# Patient Record
Sex: FEMALE | Race: BLACK OR AFRICAN AMERICAN | NOT HISPANIC OR LATINO | URBAN - METROPOLITAN AREA
[De-identification: names, ages, dates, MRNs, and addresses within clinical notes are randomized per-mention and may not be internally consistent; named-entity substitution may affect disease eponyms.]

---

## 2018-03-16 ENCOUNTER — EMERGENCY (EMERGENCY)
Facility: HOSPITAL | Age: 68
LOS: 1 days | Discharge: HOME | End: 2018-03-16
Attending: EMERGENCY MEDICINE | Admitting: EMERGENCY MEDICINE
Payer: COMMERCIAL

## 2018-03-16 ENCOUNTER — APPOINTMENT (OUTPATIENT)
Dept: VASCULAR SURGERY | Facility: CLINIC | Age: 68
End: 2018-03-16
Payer: COMMERCIAL

## 2018-03-16 VITALS
OXYGEN SATURATION: 100 % | DIASTOLIC BLOOD PRESSURE: 54 MMHG | HEART RATE: 55 BPM | WEIGHT: 169.98 LBS | RESPIRATION RATE: 16 BRPM | SYSTOLIC BLOOD PRESSURE: 120 MMHG | TEMPERATURE: 99 F

## 2018-03-16 VITALS
HEIGHT: 67 IN | OXYGEN SATURATION: 99 % | BODY MASS INDEX: 26.68 KG/M2 | DIASTOLIC BLOOD PRESSURE: 78 MMHG | HEART RATE: 60 BPM | WEIGHT: 170 LBS | SYSTOLIC BLOOD PRESSURE: 125 MMHG

## 2018-03-16 VITALS
SYSTOLIC BLOOD PRESSURE: 116 MMHG | OXYGEN SATURATION: 96 % | DIASTOLIC BLOOD PRESSURE: 75 MMHG | TEMPERATURE: 98 F | HEART RATE: 59 BPM | RESPIRATION RATE: 16 BRPM

## 2018-03-16 PROBLEM — Z00.00 ENCOUNTER FOR PREVENTIVE HEALTH EXAMINATION: Status: ACTIVE | Noted: 2018-03-16

## 2018-03-16 PROCEDURE — 82962 GLUCOSE BLOOD TEST: CPT

## 2018-03-16 PROCEDURE — 73600 X-RAY EXAM OF ANKLE: CPT

## 2018-03-16 PROCEDURE — 99283 EMERGENCY DEPT VISIT LOW MDM: CPT | Mod: 25

## 2018-03-16 PROCEDURE — 73600 X-RAY EXAM OF ANKLE: CPT | Mod: 26,LT

## 2018-03-16 PROCEDURE — 99283 EMERGENCY DEPT VISIT LOW MDM: CPT

## 2018-03-16 PROCEDURE — 99244 OFF/OP CNSLTJ NEW/EST MOD 40: CPT

## 2018-03-16 PROCEDURE — 93971 EXTREMITY STUDY: CPT

## 2018-03-16 RX ORDER — OXYCODONE AND ACETAMINOPHEN 5; 325 MG/1; MG/1
1 TABLET ORAL ONCE
Qty: 0 | Refills: 0 | Status: DISCONTINUED | OUTPATIENT
Start: 2018-03-16 | End: 2018-03-16

## 2018-03-16 RX ORDER — AZTREONAM 2 G
2 VIAL (EA) INJECTION
Qty: 38 | Refills: 0 | OUTPATIENT
Start: 2018-03-16 | End: 2018-03-25

## 2018-03-16 RX ORDER — AMOXICILLIN AND CLAVULANATE POTASSIUM 875; 125 MG/1; MG/1
875-125 TABLET, COATED ORAL
Qty: 20 | Refills: 0 | Status: ACTIVE | COMMUNITY
Start: 2018-03-16 | End: 1900-01-01

## 2018-03-16 RX ADMIN — OXYCODONE AND ACETAMINOPHEN 1 TABLET(S): 5; 325 TABLET ORAL at 16:06

## 2018-03-16 RX ADMIN — Medication 2 TABLET(S): at 17:42

## 2018-03-16 RX ADMIN — OXYCODONE AND ACETAMINOPHEN 1 TABLET(S): 5; 325 TABLET ORAL at 17:30

## 2018-03-16 NOTE — ED PROVIDER NOTE - MEDICAL DECISION MAKING DETAILS
Patient is a 69yo F w/ PMH HLD, HTN, DM, varicose veins presenting w/ LLE pain. Patient is a 67yo F w/ PMH HLD, HTN, DM, varicose veins presenting w/ LLE pain. Patient in apparent severe pain; received additional percocet (had already taken one prior to arriving) and started on DS bactrim. XR L ankle w/o e/o OM. Patient advised to take augmentin and bactrim x10 days, to take pain meds as prescribed, and to continue to follow w/ Dr. Bonilla for continued care. Patient is a 69yo F w/ PMH HLD, HTN, DM, varicose veins presenting w/ LLE pain. Patient in apparent severe pain; received additional percocet (had already taken one prior to arriving). Patient s/p 5days keflex with improved swelling, however still w/ significant pain and purulent drainage. Started on DS bactrim for MRSA coverage. XR L ankle w/o e/o OM. Patient advised to take augmentin and bactrim x10 days, to take pain meds as prescribed, and to continue to follow w/ Dr. Bonilla for continued care.

## 2018-03-16 NOTE — ED PROVIDER NOTE - CONDUCTED A DETAILED DISCUSSION WITH PATIENT AND/OR GUARDIAN REGARDING, MDM
need for outpatient follow-up/radiology results return to ED if symptoms worsen, persist or questions arise/radiology results/need for outpatient follow-up

## 2018-03-16 NOTE — ED PROVIDER NOTE - PMH
Essential hypertension    Hyperlipidemia, unspecified hyperlipidemia type    Type 2 diabetes mellitus without complication, without long-term current use of insulin    Varicose vein of leg

## 2018-03-16 NOTE — ED PROVIDER NOTE - ATTENDING CONTRIBUTION TO CARE
pt seen and examined by me, agree with above.  67 yo female with left leg ulcer, being treated by pmd and saw vascular Dr Bonilla today.  given rx for abx and pain medication but while waiting in pharmacy the pain got too much.  on exam, left distal lat leg 2 cm shallow ulcer.  left foot nv intact.  xray no osteo.  will add bactrim to abx and fu pmd

## 2018-03-16 NOTE — ED PROVIDER NOTE - OBJECTIVE STATEMENT
Patient is a 67yo F w/ PMH HLD, HTN, DM, varicose veins presenting w/ LLE pain. Patient is a 69yo F w/ PMH HLD, HTN, DM, varicose veins presenting w/ LLE pain. Patient reports that pain initially developed last Saturday in the setting of a LLL swelling, pain, and an ulcer of the L lateral ankle. Patient went to [facility] for care where she received a 5d course of Keflex for presumed cellulitis. Pt would take ibuprofen for pain. Pt reports swelling improved but pain persisted. Patient completed her abx course yest and f/u w/ Dr. Bonilla today. LE doppler was performed today which was negative, however pt continued to endorse severe pain. Pt's wound was wrapped w/ ACE and she was prescribed additional course of augmentin and percocet. While waiting to fill her prescriptions at the pharmacy, pt reports LLE pain became excruciating nd EMS was called. She took one percocet pill prior to presenting to the ED. Pt reports 10/10 pain, sharp, constant, localized to L ankle w/ radiation to the toes and mid-shin. Pt also reports purulent malodorous drainage; denies f/c/d/c/n/v, CP, dyspnea, trauma, insect/animal bites, cold extremities, immobility, recent travel.

## 2018-03-16 NOTE — ED ADULT NURSE NOTE - OBJECTIVE STATEMENT
Pt presents to ED with c/o L foot pain worsening after visiting doctor today for wound care, pt reports dx at [facility] ED with cellulitis last Saturday. Last took 1 percocet 45 min ago.

## 2018-03-16 NOTE — ED PROVIDER NOTE - CARE PLAN
Principal Discharge DX:	Cellulitis of left lower extremity  Goal:	resolution Principal Discharge DX:	Cellulitis of left lower extremity  Goal:	resolution  Assessment and plan of treatment:	XR of L ankle w/o e/o OM. Patient given additional percocet and started on DS bactrim.

## 2018-03-20 DIAGNOSIS — Z79.4 LONG TERM (CURRENT) USE OF INSULIN: ICD-10-CM

## 2018-03-20 DIAGNOSIS — I10 ESSENTIAL (PRIMARY) HYPERTENSION: ICD-10-CM

## 2018-03-20 DIAGNOSIS — E78.5 HYPERLIPIDEMIA, UNSPECIFIED: ICD-10-CM

## 2018-03-20 DIAGNOSIS — M79.672 PAIN IN LEFT FOOT: ICD-10-CM

## 2018-03-20 DIAGNOSIS — L03.116 CELLULITIS OF LEFT LOWER LIMB: ICD-10-CM

## 2018-03-20 DIAGNOSIS — E11.9 TYPE 2 DIABETES MELLITUS WITHOUT COMPLICATIONS: ICD-10-CM

## 2018-03-23 ENCOUNTER — INPATIENT (INPATIENT)
Facility: HOSPITAL | Age: 68
LOS: 12 days | Discharge: HOME HEALTH | DRG: 300 | End: 2018-04-05
Attending: SURGERY | Admitting: SURGERY
Payer: COMMERCIAL

## 2018-03-23 ENCOUNTER — APPOINTMENT (OUTPATIENT)
Dept: VASCULAR SURGERY | Facility: CLINIC | Age: 68
End: 2018-03-23
Payer: COMMERCIAL

## 2018-03-23 VITALS
DIASTOLIC BLOOD PRESSURE: 61 MMHG | TEMPERATURE: 98 F | SYSTOLIC BLOOD PRESSURE: 103 MMHG | HEART RATE: 61 BPM | OXYGEN SATURATION: 96 % | WEIGHT: 164.91 LBS | RESPIRATION RATE: 18 BRPM | HEIGHT: 67 IN

## 2018-03-23 VITALS
HEART RATE: 66 BPM | TEMPERATURE: 98.1 F | OXYGEN SATURATION: 99 % | SYSTOLIC BLOOD PRESSURE: 118 MMHG | DIASTOLIC BLOOD PRESSURE: 72 MMHG

## 2018-03-23 DIAGNOSIS — L98.491 NON-PRESSURE CHRONIC ULCER OF SKIN OF OTHER SITES LIMITED TO BREAKDOWN OF SKIN: ICD-10-CM

## 2018-03-23 DIAGNOSIS — E78.5 HYPERLIPIDEMIA, UNSPECIFIED: ICD-10-CM

## 2018-03-23 DIAGNOSIS — I10 ESSENTIAL (PRIMARY) HYPERTENSION: ICD-10-CM

## 2018-03-23 DIAGNOSIS — L98.499 NON-PRESSURE CHRONIC ULCER OF SKIN OF OTHER SITES WITH UNSPECIFIED SEVERITY: ICD-10-CM

## 2018-03-23 DIAGNOSIS — E11.9 TYPE 2 DIABETES MELLITUS WITHOUT COMPLICATIONS: ICD-10-CM

## 2018-03-23 DIAGNOSIS — E87.5 HYPERKALEMIA: ICD-10-CM

## 2018-03-23 DIAGNOSIS — L03.116 CELLULITIS OF LEFT LOWER LIMB: ICD-10-CM

## 2018-03-23 DIAGNOSIS — L08.9 NON-PRESSURE CHRONIC ULCER OF SKIN OF OTHER SITES LIMITED TO BREAKDOWN OF SKIN: ICD-10-CM

## 2018-03-23 LAB
ALBUMIN SERPL ELPH-MCNC: 4.4 G/DL — SIGNIFICANT CHANGE UP (ref 3.3–5)
ALP SERPL-CCNC: 89 U/L — SIGNIFICANT CHANGE UP (ref 40–120)
ALT FLD-CCNC: 17 U/L — SIGNIFICANT CHANGE UP (ref 10–45)
ANION GAP SERPL CALC-SCNC: 17 MMOL/L — SIGNIFICANT CHANGE UP (ref 5–17)
ANION GAP SERPL CALC-SCNC: 19 MMOL/L — HIGH (ref 5–17)
AST SERPL-CCNC: 23 U/L — SIGNIFICANT CHANGE UP (ref 10–40)
BASOPHILS NFR BLD AUTO: 0.3 % — SIGNIFICANT CHANGE UP (ref 0–2)
BILIRUB SERPL-MCNC: 0.4 MG/DL — SIGNIFICANT CHANGE UP (ref 0.2–1.2)
BUN SERPL-MCNC: 28 MG/DL — HIGH (ref 7–23)
BUN SERPL-MCNC: 29 MG/DL — HIGH (ref 7–23)
CALCIUM SERPL-MCNC: 10.1 MG/DL — SIGNIFICANT CHANGE UP (ref 8.4–10.5)
CALCIUM SERPL-MCNC: 10.4 MG/DL — SIGNIFICANT CHANGE UP (ref 8.4–10.5)
CHLORIDE SERPL-SCNC: 92 MMOL/L — LOW (ref 96–108)
CHLORIDE SERPL-SCNC: 94 MMOL/L — LOW (ref 96–108)
CO2 SERPL-SCNC: 22 MMOL/L — SIGNIFICANT CHANGE UP (ref 22–31)
CO2 SERPL-SCNC: 23 MMOL/L — SIGNIFICANT CHANGE UP (ref 22–31)
CREAT SERPL-MCNC: 1.85 MG/DL — HIGH (ref 0.5–1.3)
CREAT SERPL-MCNC: 1.93 MG/DL — HIGH (ref 0.5–1.3)
EOSINOPHIL NFR BLD AUTO: 5 % — SIGNIFICANT CHANGE UP (ref 0–6)
EXTRA BLUE TOP TUBE: SIGNIFICANT CHANGE UP
EXTRA SST TUBE: SIGNIFICANT CHANGE UP
GLUCOSE BLDC GLUCOMTR-MCNC: 158 MG/DL — HIGH (ref 70–99)
GLUCOSE BLDC GLUCOMTR-MCNC: 197 MG/DL — HIGH (ref 70–99)
GLUCOSE SERPL-MCNC: 178 MG/DL — HIGH (ref 70–99)
GLUCOSE SERPL-MCNC: 218 MG/DL — HIGH (ref 70–99)
GRAM STN FLD: SIGNIFICANT CHANGE UP
HCT VFR BLD CALC: 36.5 % — SIGNIFICANT CHANGE UP (ref 34.5–45)
HGB BLD-MCNC: 11.4 G/DL — LOW (ref 11.5–15.5)
LYMPHOCYTES # BLD AUTO: 18.4 % — SIGNIFICANT CHANGE UP (ref 13–44)
MCHC RBC-ENTMCNC: 28.6 PG — SIGNIFICANT CHANGE UP (ref 27–34)
MCHC RBC-ENTMCNC: 31.2 G/DL — LOW (ref 32–36)
MCV RBC AUTO: 91.5 FL — SIGNIFICANT CHANGE UP (ref 80–100)
MONOCYTES NFR BLD AUTO: 11.3 % — SIGNIFICANT CHANGE UP (ref 2–14)
NEUTROPHILS NFR BLD AUTO: 65 % — SIGNIFICANT CHANGE UP (ref 43–77)
PLATELET # BLD AUTO: 202 K/UL — SIGNIFICANT CHANGE UP (ref 150–400)
POTASSIUM SERPL-MCNC: 4.3 MMOL/L — SIGNIFICANT CHANGE UP (ref 3.5–5.3)
POTASSIUM SERPL-MCNC: 5.5 MMOL/L — HIGH (ref 3.5–5.3)
POTASSIUM SERPL-SCNC: 4.3 MMOL/L — SIGNIFICANT CHANGE UP (ref 3.5–5.3)
POTASSIUM SERPL-SCNC: 5.5 MMOL/L — HIGH (ref 3.5–5.3)
PROT SERPL-MCNC: 8.1 G/DL — SIGNIFICANT CHANGE UP (ref 6–8.3)
RBC # BLD: 3.99 M/UL — SIGNIFICANT CHANGE UP (ref 3.8–5.2)
RBC # FLD: 16 % — SIGNIFICANT CHANGE UP (ref 10.3–16.9)
SODIUM SERPL-SCNC: 133 MMOL/L — LOW (ref 135–145)
SODIUM SERPL-SCNC: 134 MMOL/L — LOW (ref 135–145)
SPECIMEN SOURCE: SIGNIFICANT CHANGE UP
WBC # BLD: 3.8 K/UL — SIGNIFICANT CHANGE UP (ref 3.8–10.5)
WBC # FLD AUTO: 3.8 K/UL — SIGNIFICANT CHANGE UP (ref 3.8–10.5)

## 2018-03-23 PROCEDURE — 99214 OFFICE O/P EST MOD 30 MIN: CPT

## 2018-03-23 PROCEDURE — 93010 ELECTROCARDIOGRAM REPORT: CPT | Mod: NC

## 2018-03-23 PROCEDURE — 99285 EMERGENCY DEPT VISIT HI MDM: CPT | Mod: 25

## 2018-03-23 PROCEDURE — 73610 X-RAY EXAM OF ANKLE: CPT | Mod: 26,LT

## 2018-03-23 RX ORDER — VANCOMYCIN HCL 1 G
1000 VIAL (EA) INTRAVENOUS ONCE
Qty: 0 | Refills: 0 | Status: COMPLETED | OUTPATIENT
Start: 2018-03-23 | End: 2018-03-23

## 2018-03-23 RX ORDER — SODIUM CHLORIDE 9 MG/ML
1000 INJECTION, SOLUTION INTRAVENOUS
Qty: 0 | Refills: 0 | Status: DISCONTINUED | OUTPATIENT
Start: 2018-03-23 | End: 2018-04-05

## 2018-03-23 RX ORDER — ACETAMINOPHEN 500 MG
1000 TABLET ORAL ONCE
Qty: 0 | Refills: 0 | Status: COMPLETED | OUTPATIENT
Start: 2018-03-23 | End: 2018-03-23

## 2018-03-23 RX ORDER — DEXTROSE 50 % IN WATER 50 %
25 SYRINGE (ML) INTRAVENOUS ONCE
Qty: 0 | Refills: 0 | Status: DISCONTINUED | OUTPATIENT
Start: 2018-03-23 | End: 2018-04-05

## 2018-03-23 RX ORDER — DOCUSATE SODIUM 100 MG
100 CAPSULE ORAL THREE TIMES A DAY
Qty: 0 | Refills: 0 | Status: DISCONTINUED | OUTPATIENT
Start: 2018-03-23 | End: 2018-04-05

## 2018-03-23 RX ORDER — GLUCAGON INJECTION, SOLUTION 0.5 MG/.1ML
1 INJECTION, SOLUTION SUBCUTANEOUS ONCE
Qty: 0 | Refills: 0 | Status: DISCONTINUED | OUTPATIENT
Start: 2018-03-23 | End: 2018-04-05

## 2018-03-23 RX ORDER — INSULIN LISPRO 100/ML
VIAL (ML) SUBCUTANEOUS
Qty: 0 | Refills: 0 | Status: DISCONTINUED | OUTPATIENT
Start: 2018-03-23 | End: 2018-04-05

## 2018-03-23 RX ORDER — ONDANSETRON 8 MG/1
4 TABLET, FILM COATED ORAL ONCE
Qty: 0 | Refills: 0 | Status: COMPLETED | OUTPATIENT
Start: 2018-03-23 | End: 2018-03-30

## 2018-03-23 RX ORDER — LOSARTAN POTASSIUM 100 MG/1
100 TABLET, FILM COATED ORAL DAILY
Qty: 0 | Refills: 0 | Status: DISCONTINUED | OUTPATIENT
Start: 2018-03-23 | End: 2018-04-05

## 2018-03-23 RX ORDER — SODIUM CHLORIDE 9 MG/ML
1000 INJECTION INTRAMUSCULAR; INTRAVENOUS; SUBCUTANEOUS
Qty: 0 | Refills: 0 | Status: DISCONTINUED | OUTPATIENT
Start: 2018-03-23 | End: 2018-03-25

## 2018-03-23 RX ORDER — COLLAGENASE CLOSTRIDIUM HIST. 250 UNIT/G
1 OINTMENT (GRAM) TOPICAL DAILY
Qty: 0 | Refills: 0 | Status: DISCONTINUED | OUTPATIENT
Start: 2018-03-23 | End: 2018-04-05

## 2018-03-23 RX ORDER — PIPERACILLIN AND TAZOBACTAM 4; .5 G/20ML; G/20ML
3.38 INJECTION, POWDER, LYOPHILIZED, FOR SOLUTION INTRAVENOUS EVERY 6 HOURS
Qty: 0 | Refills: 0 | Status: DISCONTINUED | OUTPATIENT
Start: 2018-03-23 | End: 2018-03-23

## 2018-03-23 RX ORDER — PIPERACILLIN AND TAZOBACTAM 4; .5 G/20ML; G/20ML
2.25 INJECTION, POWDER, LYOPHILIZED, FOR SOLUTION INTRAVENOUS EVERY 6 HOURS
Qty: 0 | Refills: 0 | Status: DISCONTINUED | OUTPATIENT
Start: 2018-03-23 | End: 2018-03-27

## 2018-03-23 RX ORDER — ACETAMINOPHEN WITH CODEINE 300MG-30MG
1 TABLET ORAL ONCE
Qty: 0 | Refills: 0 | Status: DISCONTINUED | OUTPATIENT
Start: 2018-03-23 | End: 2018-03-23

## 2018-03-23 RX ORDER — DEXTROSE 50 % IN WATER 50 %
12.5 SYRINGE (ML) INTRAVENOUS ONCE
Qty: 0 | Refills: 0 | Status: DISCONTINUED | OUTPATIENT
Start: 2018-03-23 | End: 2018-04-05

## 2018-03-23 RX ORDER — DEXTROSE 50 % IN WATER 50 %
1 SYRINGE (ML) INTRAVENOUS ONCE
Qty: 0 | Refills: 0 | Status: DISCONTINUED | OUTPATIENT
Start: 2018-03-23 | End: 2018-04-05

## 2018-03-23 RX ORDER — PIPERACILLIN AND TAZOBACTAM 4; .5 G/20ML; G/20ML
3.38 INJECTION, POWDER, LYOPHILIZED, FOR SOLUTION INTRAVENOUS ONCE
Qty: 0 | Refills: 0 | Status: COMPLETED | OUTPATIENT
Start: 2018-03-23 | End: 2018-03-23

## 2018-03-23 RX ORDER — SIMVASTATIN 20 MG/1
40 TABLET, FILM COATED ORAL AT BEDTIME
Qty: 0 | Refills: 0 | Status: DISCONTINUED | OUTPATIENT
Start: 2018-03-23 | End: 2018-04-05

## 2018-03-23 RX ORDER — SODIUM POLYSTYRENE SULFONATE 4.1 MEQ/G
30 POWDER, FOR SUSPENSION ORAL ONCE
Qty: 0 | Refills: 0 | Status: COMPLETED | OUTPATIENT
Start: 2018-03-23 | End: 2018-03-23

## 2018-03-23 RX ORDER — MORPHINE SULFATE 50 MG/1
2 CAPSULE, EXTENDED RELEASE ORAL ONCE
Qty: 0 | Refills: 0 | Status: DISCONTINUED | OUTPATIENT
Start: 2018-03-23 | End: 2018-03-24

## 2018-03-23 RX ORDER — VANCOMYCIN HCL 1 G
1000 VIAL (EA) INTRAVENOUS EVERY 12 HOURS
Qty: 0 | Refills: 0 | Status: DISCONTINUED | OUTPATIENT
Start: 2018-03-23 | End: 2018-03-23

## 2018-03-23 RX ORDER — VANCOMYCIN HCL 1 G
1000 VIAL (EA) INTRAVENOUS EVERY 24 HOURS
Qty: 0 | Refills: 0 | Status: DISCONTINUED | OUTPATIENT
Start: 2018-03-24 | End: 2018-03-25

## 2018-03-23 RX ADMIN — Medication 1 TABLET(S): at 14:44

## 2018-03-23 RX ADMIN — PIPERACILLIN AND TAZOBACTAM 200 GRAM(S): 4; .5 INJECTION, POWDER, LYOPHILIZED, FOR SOLUTION INTRAVENOUS at 13:47

## 2018-03-23 RX ADMIN — PIPERACILLIN AND TAZOBACTAM 200 GRAM(S): 4; .5 INJECTION, POWDER, LYOPHILIZED, FOR SOLUTION INTRAVENOUS at 19:14

## 2018-03-23 RX ADMIN — SIMVASTATIN 40 MILLIGRAM(S): 20 TABLET, FILM COATED ORAL at 22:14

## 2018-03-23 RX ADMIN — Medication 400 MILLIGRAM(S): at 22:14

## 2018-03-23 RX ADMIN — SODIUM POLYSTYRENE SULFONATE 30 GRAM(S): 4.1 POWDER, FOR SUSPENSION ORAL at 14:44

## 2018-03-23 RX ADMIN — Medication 2: at 22:30

## 2018-03-23 RX ADMIN — Medication 1 APPLICATION(S): at 22:14

## 2018-03-23 RX ADMIN — Medication 250 MILLIGRAM(S): at 14:38

## 2018-03-23 RX ADMIN — Medication 1 TABLET(S): at 15:15

## 2018-03-23 RX ADMIN — Medication 1000 MILLIGRAM(S): at 22:29

## 2018-03-23 RX ADMIN — SODIUM CHLORIDE 125 MILLILITER(S): 9 INJECTION INTRAMUSCULAR; INTRAVENOUS; SUBCUTANEOUS at 13:47

## 2018-03-23 NOTE — ED PROVIDER NOTE - DIAGNOSTIC INTERPRETATION
ER Physician:   INTERPRETATION: left ankle : no fracture, + ulceration on soft tissue, no periosteal indication of osteomyelitis

## 2018-03-23 NOTE — ED PROVIDER NOTE - NEURO NEGATIVE STATEMENT, MLM
no sensory deficits, and no weakness. DM (diabetes mellitus)    High cholesterol    HTN (hypertension)    Prostate CA    Pulmonary hypertension    Renal insufficiency    Sleep apnea

## 2018-03-23 NOTE — ED PROVIDER NOTE - ATTENDING CONTRIBUTION TO CARE
67 y/o f with DM ulcer to left lateral malleolus sent by vascular surgery for evaluation.  Has been on bactrim daily - continues to complain of pain over area.  Will call for vascular for plan.

## 2018-03-23 NOTE — ED ADULT NURSE NOTE - OBJECTIVE STATEMENT
c/o left ankle ulcer with foul odor and purulent drain x 1 month. Pt states "I had a varicose vein that burst open and never healed." Pt also reports having subjective fever and chills last night. Pt is currently on Amoxicillin and Bactrim that was started last week, however wound is not getting better. Pt was referred for  direct admit, however was unable to tolerate foot pain and came to the ED.

## 2018-03-23 NOTE — H&P ADULT - HISTORY OF PRESENT ILLNESS
Attending: Irene    HPI:    Patient is a 68y old  Female with PMHx of HTN, HLD, DM who presents c/o of worsening left lateral malleolar ulcer and pain x 1 month. Per patient one month ago she had varicosity that "burst" in the same location and subsequently developed progressive blistering and ulceration of the skin. Pt failed outpatient tx of Keflex, bactrim and amoxicillin. Ulcer has worsened since Tuesday per patient, with associated subjective fever/chills, purulent drainage from the wound, N/V, multiple episodes of diarrhea, and severe sharp pain in the LLE not controlled with Percocet. Patient is admitted to vascular surgery for IV antibiotics and wound management.    Patient denies chest pain, SOB, LE edema, hx of DVT or PE, hx of PAD.        PAST MEDICAL & SURGICAL HISTORY:  Varicose vein of leg  Type 2 diabetes mellitus without complication, without long-term current use of insulin  Essential hypertension  Hyperlipidemia, unspecified hyperlipidemia type  No significant past surgical history      Home Medications:          Vitals (Last 12 Hours)  T(F): 98.2 (03-23-18 @ 13:50), Max: 98.2 (03-23-18 @ 13:50)  HR:  (54 - 61)  BP:  (103/58 - 103/61)  RR:  (18 - 18)  SpO2:  (96% - 96%)        PHYSICAL EXAM:  General: NAD, wdwn, AxOx3  Pulmonary: Nonlabored breathing, no respiratory distress, saturating 96 on RA  Cardiovascular:  Abdominal:   Extremities: warm, hyperpigmentation of the bilateral lower extremities, 3x4 cm macerated, erythematous ulcer on the lateral malleolus with mucopurulent drainage and blistering, palpable pulses b/l LE     Pulse Exam:  - Right:  ----- DP: 2+ monophasic   ----- PT: 2+ biphasic   ----- POP:   ----- FEM:  Left:   ----- DP: 2+ monophasic  ----- PT: 2+ biphasic   ----- POP:   ----- FEM:         LABS:                          11.4   3.8   )-----------( 202      ( 23 Mar 2018 12:13 )             36.5     03-23    133<L>  |  94<L>  |  28<H>  ----------------------------<  218<H>  5.5<H>   |  22  |  1.93<H>    Ca    10.4      23 Mar 2018 12:12    TPro  8.1  /  Alb  4.4  /  TBili  0.4  /  DBili  x   /  AST  23  /  ALT  17  /  AlkPhos  89  03-23            CAPILLARY BLOOD GLUCOSE        CAPILLARY BLOOD GLUCOSE            Microbiology:        RADIOLOGY & ADDITIONAL STUDIES:

## 2018-03-23 NOTE — ED ADULT TRIAGE NOTE - CHIEF COMPLAINT QUOTE
pt c/o worsening left foot pain due to ulcer over the past month. pt also reports abd pain and n/v since Tuesday. hx of diabetes.

## 2018-03-23 NOTE — ED PROVIDER NOTE - MEDICAL DECISION MAKING DETAILS
Patient with left lower leg infected ulceration. Afebrile, IV abx therapy started and vascular was consulted. Patient was admitted for IV abx therapy.

## 2018-03-23 NOTE — H&P ADULT - PROBLEM SELECTOR PLAN 1
- Pt failed outpt treatment with Keflex, Bactrim and Amox  - blood culture x2 and wound culture obtained in ED, f/u results  - XRay ankle ordered, f/u results   - Vanc started in ED on 3/23, f/u troph

## 2018-03-23 NOTE — H&P ADULT - ASSESSMENT
Pt is a 69 yo F with PMHx of DM, HTN, HLD who presents c/o worsening pain and ulceration of the left lateral malleolus after failed outpatient therapy. Pt is admitted to vascular surgery for IV abx treatment and wound management.

## 2018-03-24 LAB
ANION GAP SERPL CALC-SCNC: 11 MMOL/L — SIGNIFICANT CHANGE UP (ref 5–17)
APPEARANCE UR: CLEAR — SIGNIFICANT CHANGE UP
BILIRUB UR-MCNC: NEGATIVE — SIGNIFICANT CHANGE UP
BUN SERPL-MCNC: 21 MG/DL — SIGNIFICANT CHANGE UP (ref 7–23)
CALCIUM SERPL-MCNC: 8.8 MG/DL — SIGNIFICANT CHANGE UP (ref 8.4–10.5)
CHLORIDE SERPL-SCNC: 100 MMOL/L — SIGNIFICANT CHANGE UP (ref 96–108)
CO2 SERPL-SCNC: 23 MMOL/L — SIGNIFICANT CHANGE UP (ref 22–31)
COLOR SPEC: YELLOW — SIGNIFICANT CHANGE UP
CREAT SERPL-MCNC: 1.42 MG/DL — HIGH (ref 0.5–1.3)
DIFF PNL FLD: NEGATIVE — SIGNIFICANT CHANGE UP
GLUCOSE BLDC GLUCOMTR-MCNC: 113 MG/DL — HIGH (ref 70–99)
GLUCOSE BLDC GLUCOMTR-MCNC: 190 MG/DL — HIGH (ref 70–99)
GLUCOSE BLDC GLUCOMTR-MCNC: 214 MG/DL — HIGH (ref 70–99)
GLUCOSE BLDC GLUCOMTR-MCNC: 248 MG/DL — HIGH (ref 70–99)
GLUCOSE SERPL-MCNC: 130 MG/DL — HIGH (ref 70–99)
GLUCOSE UR QL: >=1000
HBA1C BLD-MCNC: 7.3 % — HIGH (ref 4–5.6)
HCT VFR BLD CALC: 32.5 % — LOW (ref 34.5–45)
HGB BLD-MCNC: 9.9 G/DL — LOW (ref 11.5–15.5)
KETONES UR-MCNC: NEGATIVE — SIGNIFICANT CHANGE UP
LEUKOCYTE ESTERASE UR-ACNC: NEGATIVE — SIGNIFICANT CHANGE UP
MAGNESIUM SERPL-MCNC: 2.3 MG/DL — SIGNIFICANT CHANGE UP (ref 1.6–2.6)
MCHC RBC-ENTMCNC: 27.7 PG — SIGNIFICANT CHANGE UP (ref 27–34)
MCHC RBC-ENTMCNC: 30.5 G/DL — LOW (ref 32–36)
MCV RBC AUTO: 90.8 FL — SIGNIFICANT CHANGE UP (ref 80–100)
NITRITE UR-MCNC: NEGATIVE — SIGNIFICANT CHANGE UP
PH UR: 7.5 — SIGNIFICANT CHANGE UP (ref 5–8)
PHOSPHATE SERPL-MCNC: 3.7 MG/DL — SIGNIFICANT CHANGE UP (ref 2.5–4.5)
PLATELET # BLD AUTO: 189 K/UL — SIGNIFICANT CHANGE UP (ref 150–400)
POTASSIUM SERPL-MCNC: 4.3 MMOL/L — SIGNIFICANT CHANGE UP (ref 3.5–5.3)
POTASSIUM SERPL-SCNC: 4.3 MMOL/L — SIGNIFICANT CHANGE UP (ref 3.5–5.3)
PROT UR-MCNC: NEGATIVE MG/DL — SIGNIFICANT CHANGE UP
RBC # BLD: 3.58 M/UL — LOW (ref 3.8–5.2)
RBC # FLD: 15.8 % — SIGNIFICANT CHANGE UP (ref 10.3–16.9)
SODIUM SERPL-SCNC: 134 MMOL/L — LOW (ref 135–145)
SP GR SPEC: 1.01 — SIGNIFICANT CHANGE UP (ref 1–1.03)
UROBILINOGEN FLD QL: 0.2 E.U./DL — SIGNIFICANT CHANGE UP
WBC # BLD: 3.6 K/UL — LOW (ref 3.8–10.5)
WBC # FLD AUTO: 3.6 K/UL — LOW (ref 3.8–10.5)

## 2018-03-24 RX ORDER — OXYCODONE AND ACETAMINOPHEN 5; 325 MG/1; MG/1
1 TABLET ORAL ONCE
Qty: 0 | Refills: 0 | Status: DISCONTINUED | OUTPATIENT
Start: 2018-03-24 | End: 2018-03-24

## 2018-03-24 RX ORDER — PANTOPRAZOLE SODIUM 20 MG/1
40 TABLET, DELAYED RELEASE ORAL ONCE
Qty: 0 | Refills: 0 | Status: COMPLETED | OUTPATIENT
Start: 2018-03-24 | End: 2018-03-24

## 2018-03-24 RX ORDER — OXYCODONE AND ACETAMINOPHEN 5; 325 MG/1; MG/1
2 TABLET ORAL EVERY 4 HOURS
Qty: 0 | Refills: 0 | Status: DISCONTINUED | OUTPATIENT
Start: 2018-03-24 | End: 2018-03-31

## 2018-03-24 RX ORDER — OXYCODONE AND ACETAMINOPHEN 5; 325 MG/1; MG/1
1 TABLET ORAL EVERY 4 HOURS
Qty: 0 | Refills: 0 | Status: DISCONTINUED | OUTPATIENT
Start: 2018-03-24 | End: 2018-04-01

## 2018-03-24 RX ORDER — MORPHINE SULFATE 50 MG/1
2 CAPSULE, EXTENDED RELEASE ORAL ONCE
Qty: 0 | Refills: 0 | Status: DISCONTINUED | OUTPATIENT
Start: 2018-03-24 | End: 2018-03-24

## 2018-03-24 RX ORDER — HEPARIN SODIUM 5000 [USP'U]/ML
5000 INJECTION INTRAVENOUS; SUBCUTANEOUS EVERY 8 HOURS
Qty: 0 | Refills: 0 | Status: DISCONTINUED | OUTPATIENT
Start: 2018-03-24 | End: 2018-04-05

## 2018-03-24 RX ADMIN — Medication 2: at 11:48

## 2018-03-24 RX ADMIN — Medication 4: at 22:59

## 2018-03-24 RX ADMIN — MORPHINE SULFATE 2 MILLIGRAM(S): 50 CAPSULE, EXTENDED RELEASE ORAL at 05:55

## 2018-03-24 RX ADMIN — OXYCODONE AND ACETAMINOPHEN 1 TABLET(S): 5; 325 TABLET ORAL at 20:43

## 2018-03-24 RX ADMIN — PIPERACILLIN AND TAZOBACTAM 200 GRAM(S): 4; .5 INJECTION, POWDER, LYOPHILIZED, FOR SOLUTION INTRAVENOUS at 13:38

## 2018-03-24 RX ADMIN — OXYCODONE AND ACETAMINOPHEN 1 TABLET(S): 5; 325 TABLET ORAL at 21:48

## 2018-03-24 RX ADMIN — SIMVASTATIN 40 MILLIGRAM(S): 20 TABLET, FILM COATED ORAL at 20:42

## 2018-03-24 RX ADMIN — OXYCODONE AND ACETAMINOPHEN 1 TABLET(S): 5; 325 TABLET ORAL at 16:34

## 2018-03-24 RX ADMIN — MORPHINE SULFATE 2 MILLIGRAM(S): 50 CAPSULE, EXTENDED RELEASE ORAL at 00:08

## 2018-03-24 RX ADMIN — PIPERACILLIN AND TAZOBACTAM 200 GRAM(S): 4; .5 INJECTION, POWDER, LYOPHILIZED, FOR SOLUTION INTRAVENOUS at 00:54

## 2018-03-24 RX ADMIN — OXYCODONE AND ACETAMINOPHEN 1 TABLET(S): 5; 325 TABLET ORAL at 23:19

## 2018-03-24 RX ADMIN — PIPERACILLIN AND TAZOBACTAM 200 GRAM(S): 4; .5 INJECTION, POWDER, LYOPHILIZED, FOR SOLUTION INTRAVENOUS at 18:13

## 2018-03-24 RX ADMIN — OXYCODONE AND ACETAMINOPHEN 1 TABLET(S): 5; 325 TABLET ORAL at 12:45

## 2018-03-24 RX ADMIN — MORPHINE SULFATE 2 MILLIGRAM(S): 50 CAPSULE, EXTENDED RELEASE ORAL at 00:27

## 2018-03-24 RX ADMIN — Medication 4: at 16:34

## 2018-03-24 RX ADMIN — MORPHINE SULFATE 2 MILLIGRAM(S): 50 CAPSULE, EXTENDED RELEASE ORAL at 06:10

## 2018-03-24 RX ADMIN — LOSARTAN POTASSIUM 100 MILLIGRAM(S): 100 TABLET, FILM COATED ORAL at 05:54

## 2018-03-24 RX ADMIN — OXYCODONE AND ACETAMINOPHEN 1 TABLET(S): 5; 325 TABLET ORAL at 12:05

## 2018-03-24 RX ADMIN — PANTOPRAZOLE SODIUM 40 MILLIGRAM(S): 20 TABLET, DELAYED RELEASE ORAL at 23:17

## 2018-03-24 RX ADMIN — Medication 100 MILLIGRAM(S): at 20:42

## 2018-03-24 RX ADMIN — PIPERACILLIN AND TAZOBACTAM 200 GRAM(S): 4; .5 INJECTION, POWDER, LYOPHILIZED, FOR SOLUTION INTRAVENOUS at 06:02

## 2018-03-24 RX ADMIN — OXYCODONE AND ACETAMINOPHEN 1 TABLET(S): 5; 325 TABLET ORAL at 17:00

## 2018-03-24 RX ADMIN — Medication 250 MILLIGRAM(S): at 14:39

## 2018-03-24 RX ADMIN — SODIUM CHLORIDE 125 MILLILITER(S): 9 INJECTION INTRAMUSCULAR; INTRAVENOUS; SUBCUTANEOUS at 02:37

## 2018-03-24 NOTE — PROGRESS NOTE ADULT - SUBJECTIVE AND OBJECTIVE BOX
o/n; 6pm k 4.3  3/23: admitted for lateral malleolus ulcer; K 5.5 on admission; Kayexelate given        Pt is a 69 yo F with PMHx of DM, HTN, HLD who presents c/o worsening pain and ulceration of the left lateral malleolus after failed outpatient therapy. Pt is admitted to vascular surgery for IV abx treatment and wound management.      Problem/Plan - 1:  ·  Problem: Infected ulcer of skin.  Plan: - Pt failed outpt treatment with Keflex, Bactrim and Amox  - blood culture x2 and wound culture obtained in ED, f/u results  - XRay ankle ordered, f/u results   - Vanc started in ED on 3/23, f/u trough.     Problem/Plan - 2:  ·  Problem: Essential hypertension.  Plan: -SBP stable in the 100s  - continue with home meds, adjust as needed.     Problem/Plan - 3:  ·  Problem: Hyperlipidemia, unspecified hyperlipidemia type.  Plan: -Continue with home meds.     Problem/Plan - 4:  ·  Problem: Type 2 diabetes mellitus without complication, without long-term current use of insulin.  Plan: - ISS  - f/u A1c. o/n; 6pm k 4.3  3/24: Pt  admitted for lateral malleolus ulcer HD#2  K 5.5 on admission; Kayexelate given    piperacillin/tazobactam IVPB. 2.25  vancomycin  IVPB 1000  losartan 100  piperacillin/tazobactam IVPB. 2.25  vancomycin  IVPB 1000      Allergies    No Known Allergies    Intolerances    codeine (Vomiting)      Vital Signs Last 24 Hrs  T(C): 36.2 (24 Mar 2018 10:17), Max: 36.8 (23 Mar 2018 13:50)  T(F): 97.1 (24 Mar 2018 10:17), Max: 98.2 (23 Mar 2018 13:50)  HR: 85 (24 Mar 2018 09:14) (53 - 85)  BP: 139/59 (24 Mar 2018 09:14) (103/58 - 139/59)  BP(mean): 104 (23 Mar 2018 17:39) (104 - 104)  RR: 16 (24 Mar 2018 09:14) (16 - 18)  SpO2: 100% (24 Mar 2018 09:14) (96% - 100%)  I&O's Summary    23 Mar 2018 07:01  -  24 Mar 2018 07:00  --------------------------------------------------------  IN: 1695 mL / OUT: 0 mL / NET: 1695 mL    24 Mar 2018 07:01  -  24 Mar 2018 11:22  --------------------------------------------------------  IN: 240 mL / OUT: 400 mL / NET: -160 mL        Physical Exam:  General:  PT AXOX3 in NAD  Pulmonary:  Unlabored breathing  Cardiovascular:  S1S2   Extremities: L lateral malleolus ulcer, no surrounding erythema or edema.  Wet/dry dressing placed    LABS:                        9.9    3.6   )-----------( 189      ( 24 Mar 2018 06:53 )             32.5     03-24    134<L>  |  100  |  21  ----------------------------<  130<H>  4.3   |  23  |  1.42<H>    Ca    8.8      24 Mar 2018 06:53  Phos  3.7     03-24  Mg     2.3     03-24    TPro  8.1  /  Alb  4.4  /  TBili  0.4  /  DBili  x   /  AST  23  /  ALT  17  /  AlkPhos  89  03-23        Radiology and Additional Studies:    Pt is a 69 yo F with PMHx of DM, HTN, HLD who presents c/o worsening pain and ulceration of the left lateral malleolus after failed outpatient therapy. Pt is admitted to vascular surgery for IV abx treatment and wound management.      Problem/Plan - 1:  ·  Problem: Infected ulcer of skin.  Plan: - Pt failed outpt treatment with Keflex, Bactrim and Amox  - blood culture x2 and wound culture obtained in ED, f/u results  - XRay ankle ordered, f/u results   - Vanc started in ED on 3/23, f/u trough.     Problem/Plan - 2:  ·  Problem: Essential hypertension.  Plan: -SBP stable in the 100s  - continue with home meds, adjust as needed.     Problem/Plan - 3:  ·  Problem: Hyperlipidemia, unspecified hyperlipidemia type.  Plan: -Continue with home meds.     Problem/Plan - 4:  ·  Problem: Type 2 diabetes mellitus without complication, without long-term current use of insulin.  Plan: - ISS  - f/u A1c.

## 2018-03-25 LAB
-  AZTREONAM: SIGNIFICANT CHANGE UP
-  AZTREONAM: SIGNIFICANT CHANGE UP
-  CEFTAZIDIME: SIGNIFICANT CHANGE UP
-  CEFTAZIDIME: SIGNIFICANT CHANGE UP
-  CIPROFLOXACIN: SIGNIFICANT CHANGE UP
-  GENTAMICIN: SIGNIFICANT CHANGE UP
-  GENTAMICIN: SIGNIFICANT CHANGE UP
-  PIPERACILLIN/TAZOBACTAM: SIGNIFICANT CHANGE UP
-  PIPERACILLIN/TAZOBACTAM: SIGNIFICANT CHANGE UP
-  TICARCILLIN/CLAVULANATE: SIGNIFICANT CHANGE UP
-  TOBRAMYCIN: SIGNIFICANT CHANGE UP
-  TOBRAMYCIN: SIGNIFICANT CHANGE UP
ANION GAP SERPL CALC-SCNC: 10 MMOL/L — SIGNIFICANT CHANGE UP (ref 5–17)
BUN SERPL-MCNC: 17 MG/DL — SIGNIFICANT CHANGE UP (ref 7–23)
CALCIUM SERPL-MCNC: 8.6 MG/DL — SIGNIFICANT CHANGE UP (ref 8.4–10.5)
CHLORIDE SERPL-SCNC: 105 MMOL/L — SIGNIFICANT CHANGE UP (ref 96–108)
CO2 SERPL-SCNC: 22 MMOL/L — SIGNIFICANT CHANGE UP (ref 22–31)
CREAT SERPL-MCNC: 0.99 MG/DL — SIGNIFICANT CHANGE UP (ref 0.5–1.3)
CULTURE RESULTS: NO GROWTH — SIGNIFICANT CHANGE UP
CULTURE RESULTS: SIGNIFICANT CHANGE UP
GLUCOSE BLDC GLUCOMTR-MCNC: 129 MG/DL — HIGH (ref 70–99)
GLUCOSE BLDC GLUCOMTR-MCNC: 226 MG/DL — HIGH (ref 70–99)
GLUCOSE BLDC GLUCOMTR-MCNC: 279 MG/DL — HIGH (ref 70–99)
GLUCOSE SERPL-MCNC: 124 MG/DL — HIGH (ref 70–99)
HCT VFR BLD CALC: 32 % — LOW (ref 34.5–45)
HGB BLD-MCNC: 9.5 G/DL — LOW (ref 11.5–15.5)
MAGNESIUM SERPL-MCNC: 2.1 MG/DL — SIGNIFICANT CHANGE UP (ref 1.6–2.6)
MCHC RBC-ENTMCNC: 28 PG — SIGNIFICANT CHANGE UP (ref 27–34)
MCHC RBC-ENTMCNC: 29.7 G/DL — LOW (ref 32–36)
MCV RBC AUTO: 94.4 FL — SIGNIFICANT CHANGE UP (ref 80–100)
METHOD TYPE: SIGNIFICANT CHANGE UP
METHOD TYPE: SIGNIFICANT CHANGE UP
ORGANISM # SPEC MICROSCOPIC CNT: SIGNIFICANT CHANGE UP
PLATELET # BLD AUTO: 169 K/UL — SIGNIFICANT CHANGE UP (ref 150–400)
POTASSIUM SERPL-MCNC: 4.7 MMOL/L — SIGNIFICANT CHANGE UP (ref 3.5–5.3)
POTASSIUM SERPL-SCNC: 4.7 MMOL/L — SIGNIFICANT CHANGE UP (ref 3.5–5.3)
RBC # BLD: 3.39 M/UL — LOW (ref 3.8–5.2)
RBC # FLD: 16 % — SIGNIFICANT CHANGE UP (ref 10.3–16.9)
SODIUM SERPL-SCNC: 137 MMOL/L — SIGNIFICANT CHANGE UP (ref 135–145)
SPECIMEN SOURCE: SIGNIFICANT CHANGE UP
SPECIMEN SOURCE: SIGNIFICANT CHANGE UP
WBC # BLD: 3.1 K/UL — LOW (ref 3.8–10.5)
WBC # FLD AUTO: 3.1 K/UL — LOW (ref 3.8–10.5)

## 2018-03-25 RX ORDER — SENNA PLUS 8.6 MG/1
2 TABLET ORAL AT BEDTIME
Qty: 0 | Refills: 0 | Status: DISCONTINUED | OUTPATIENT
Start: 2018-03-25 | End: 2018-03-27

## 2018-03-25 RX ORDER — GABAPENTIN 400 MG/1
300 CAPSULE ORAL
Qty: 0 | Refills: 0 | Status: DISCONTINUED | OUTPATIENT
Start: 2018-03-25 | End: 2018-03-26

## 2018-03-25 RX ORDER — SODIUM CHLORIDE 9 MG/ML
3 INJECTION INTRAMUSCULAR; INTRAVENOUS; SUBCUTANEOUS EVERY 8 HOURS
Qty: 0 | Refills: 0 | Status: DISCONTINUED | OUTPATIENT
Start: 2018-03-25 | End: 2018-04-05

## 2018-03-25 RX ADMIN — OXYCODONE AND ACETAMINOPHEN 2 TABLET(S): 5; 325 TABLET ORAL at 09:30

## 2018-03-25 RX ADMIN — SODIUM CHLORIDE 3 MILLILITER(S): 9 INJECTION INTRAMUSCULAR; INTRAVENOUS; SUBCUTANEOUS at 13:11

## 2018-03-25 RX ADMIN — GABAPENTIN 300 MILLIGRAM(S): 400 CAPSULE ORAL at 10:23

## 2018-03-25 RX ADMIN — SIMVASTATIN 40 MILLIGRAM(S): 20 TABLET, FILM COATED ORAL at 23:12

## 2018-03-25 RX ADMIN — SODIUM CHLORIDE 3 MILLILITER(S): 9 INJECTION INTRAMUSCULAR; INTRAVENOUS; SUBCUTANEOUS at 23:19

## 2018-03-25 RX ADMIN — OXYCODONE AND ACETAMINOPHEN 2 TABLET(S): 5; 325 TABLET ORAL at 04:52

## 2018-03-25 RX ADMIN — HEPARIN SODIUM 5000 UNIT(S): 5000 INJECTION INTRAVENOUS; SUBCUTANEOUS at 06:56

## 2018-03-25 RX ADMIN — PIPERACILLIN AND TAZOBACTAM 200 GRAM(S): 4; .5 INJECTION, POWDER, LYOPHILIZED, FOR SOLUTION INTRAVENOUS at 01:00

## 2018-03-25 RX ADMIN — SENNA PLUS 2 TABLET(S): 8.6 TABLET ORAL at 23:11

## 2018-03-25 RX ADMIN — HEPARIN SODIUM 5000 UNIT(S): 5000 INJECTION INTRAVENOUS; SUBCUTANEOUS at 13:02

## 2018-03-25 RX ADMIN — PIPERACILLIN AND TAZOBACTAM 200 GRAM(S): 4; .5 INJECTION, POWDER, LYOPHILIZED, FOR SOLUTION INTRAVENOUS at 13:01

## 2018-03-25 RX ADMIN — LOSARTAN POTASSIUM 100 MILLIGRAM(S): 100 TABLET, FILM COATED ORAL at 06:56

## 2018-03-25 RX ADMIN — Medication 100 MILLIGRAM(S): at 06:56

## 2018-03-25 RX ADMIN — PIPERACILLIN AND TAZOBACTAM 200 GRAM(S): 4; .5 INJECTION, POWDER, LYOPHILIZED, FOR SOLUTION INTRAVENOUS at 18:16

## 2018-03-25 RX ADMIN — OXYCODONE AND ACETAMINOPHEN 2 TABLET(S): 5; 325 TABLET ORAL at 08:58

## 2018-03-25 RX ADMIN — OXYCODONE AND ACETAMINOPHEN 2 TABLET(S): 5; 325 TABLET ORAL at 14:45

## 2018-03-25 RX ADMIN — Medication 100 MILLIGRAM(S): at 13:02

## 2018-03-25 RX ADMIN — OXYCODONE AND ACETAMINOPHEN 2 TABLET(S): 5; 325 TABLET ORAL at 23:11

## 2018-03-25 RX ADMIN — Medication 4: at 16:18

## 2018-03-25 RX ADMIN — OXYCODONE AND ACETAMINOPHEN 2 TABLET(S): 5; 325 TABLET ORAL at 15:30

## 2018-03-25 RX ADMIN — PIPERACILLIN AND TAZOBACTAM 200 GRAM(S): 4; .5 INJECTION, POWDER, LYOPHILIZED, FOR SOLUTION INTRAVENOUS at 06:58

## 2018-03-25 RX ADMIN — Medication 100 MILLIGRAM(S): at 23:11

## 2018-03-25 RX ADMIN — OXYCODONE AND ACETAMINOPHEN 2 TABLET(S): 5; 325 TABLET ORAL at 05:52

## 2018-03-25 RX ADMIN — Medication 6: at 23:16

## 2018-03-25 RX ADMIN — OXYCODONE AND ACETAMINOPHEN 1 TABLET(S): 5; 325 TABLET ORAL at 00:19

## 2018-03-25 RX ADMIN — HEPARIN SODIUM 5000 UNIT(S): 5000 INJECTION INTRAVENOUS; SUBCUTANEOUS at 23:12

## 2018-03-25 NOTE — PROGRESS NOTE ADULT - SUBJECTIVE AND OBJECTIVE BOX
24hr Events:  O/N:Pain control, ordered for SQH, VSS  3/24: Pain control, ROXANN, VSS      Assessment/Plan;  Pt is a 69 yo F with PMHx of DM, HTN, HLD who presents c/o worsening pain and ulceration of the left lateral malleolus after failed outpatient therapy. Pt is admitted to vascular surgery for IV abx treatment and wound management.      Problem/Plan - 1:  ·  Problem: Infected ulcer of skin.  Plan: - Pt failed outpt treatment with Keflex, Bactrim and Amox  - blood culture x2 and wound culture obtained in ED, f/u results  - Vanc/Zosyn    Problem/Plan - 2:  ·  Problem: Essential hypertension.  Plan: -SBP stable in the 100s  - continue with home meds, adjust as needed.     Problem/Plan - 3:  ·  Problem: Hyperlipidemia, unspecified hyperlipidemia type.  Plan: -Continue with home meds.     Problem/Plan - 4:  ·  Problem: Type 2 diabetes mellitus without complication, without long-term current use of insulin.  Plan: - ISS  - f/u A1c. Patient seen and examined at bedside. Patient c/o constant burning pain of the left lateral ulceration.     24hr Events:  O/N:Pain control, ordered for SQH, VSS  3/24: Pain control, ROXANN, VSS    piperacillin/tazobactam IVPB. 2.25  vancomycin  IVPB 1000  heparin  Injectable 5000  losartan 100  piperacillin/tazobactam IVPB. 2.25  vancomycin  IVPB 1000        Vital Signs Last 24 Hrs  T(C): 36.2 (25 Mar 2018 08:39), Max: 36.8 (25 Mar 2018 07:09)  T(F): 97.1 (25 Mar 2018 08:39), Max: 98.2 (25 Mar 2018 07:09)  HR: 60 (25 Mar 2018 12:03) (56 - 76)  BP: 106/52 (25 Mar 2018 12:03) (103/52 - 124/59)  BP(mean): --  RR: 16 (25 Mar 2018 12:03) (16 - 16)  SpO2: 100% (25 Mar 2018 12:03) (97% - 100%)  I&O's Detail    24 Mar 2018 07:01  -  25 Mar 2018 07:00  --------------------------------------------------------  IN:    IV PiggyBack: 200 mL    Oral Fluid: 840 mL    sodium chloride 0.9%: 1250 mL  Total IN: 2290 mL    OUT:    Voided: 400 mL  Total OUT: 400 mL    Total NET: 1890 mL      25 Mar 2018 07:01  -  25 Mar 2018 12:30  --------------------------------------------------------  IN:    Oral Fluid: 180 mL  Total IN: 180 mL    OUT:  Total OUT: 0 mL    Total NET: 180 mL          Physical Exam:  General: NAD, resting comfortably in bed  Pulmonary: Nonlabored breathing, no respiratory distress  Cardiovascular: NSR  Abdominal: soft, NT/ND  Extremities: WWP  L lateral malleolus ulcer, no surrounding erythema or edema.  Wet/dry dressing placed      LABS:                        9.5    3.1   )-----------( 169      ( 25 Mar 2018 05:47 )             32.0     03-25    137  |  105  |  17  ----------------------------<  124<H>  4.7   |  22  |  0.99    Ca    8.6      25 Mar 2018 05:47  Phos  3.7     03-24  Mg     2.1     03-25          Radiology and Additional Studies:    Assessment and Plan:     Assessment/Plan;  Pt is a 67 yo F with PMHx of DM, HTN, HLD who presents c/o worsening pain and ulceration of the left lateral malleolus after failed outpatient therapy. Pt is admitted to vascular surgery for IV abx treatment and wound management.      Problem/Plan - 1:  ·  Problem: Infected ulcer of skin.  Plan: - Pt failed outpt treatment with Keflex, Bactrim and Amox  - blood culture x2 and wound culture obtained in ED, f/u results  - Vanc/Zosyn  - Gabapentin for chronic pain     Problem/Plan - 2:  ·  Problem: Essential hypertension.  Plan: -SBP stable in the 100s  - continue with home meds, adjust as needed.     Problem/Plan - 3:  ·  Problem: Hyperlipidemia, unspecified hyperlipidemia type.  Plan: -Continue with home meds.     Problem/Plan - 4:  ·  Problem: Type 2 diabetes mellitus without complication, without long-term current use of insulin.  Plan: - ISS  - f/u A1c.

## 2018-03-26 LAB
ANION GAP SERPL CALC-SCNC: 10 MMOL/L — SIGNIFICANT CHANGE UP (ref 5–17)
BUN SERPL-MCNC: 11 MG/DL — SIGNIFICANT CHANGE UP (ref 7–23)
CALCIUM SERPL-MCNC: 9.3 MG/DL — SIGNIFICANT CHANGE UP (ref 8.4–10.5)
CHLORIDE SERPL-SCNC: 105 MMOL/L — SIGNIFICANT CHANGE UP (ref 96–108)
CO2 SERPL-SCNC: 25 MMOL/L — SIGNIFICANT CHANGE UP (ref 22–31)
CREAT SERPL-MCNC: 0.97 MG/DL — SIGNIFICANT CHANGE UP (ref 0.5–1.3)
GLUCOSE BLDC GLUCOMTR-MCNC: 159 MG/DL — HIGH (ref 70–99)
GLUCOSE BLDC GLUCOMTR-MCNC: 170 MG/DL — HIGH (ref 70–99)
GLUCOSE BLDC GLUCOMTR-MCNC: 223 MG/DL — HIGH (ref 70–99)
GLUCOSE BLDC GLUCOMTR-MCNC: 228 MG/DL — HIGH (ref 70–99)
GLUCOSE SERPL-MCNC: 177 MG/DL — HIGH (ref 70–99)
HCT VFR BLD CALC: 32.6 % — LOW (ref 34.5–45)
HGB BLD-MCNC: 9.7 G/DL — LOW (ref 11.5–15.5)
MAGNESIUM SERPL-MCNC: 1.9 MG/DL — SIGNIFICANT CHANGE UP (ref 1.6–2.6)
MCHC RBC-ENTMCNC: 27.6 PG — SIGNIFICANT CHANGE UP (ref 27–34)
MCHC RBC-ENTMCNC: 29.8 G/DL — LOW (ref 32–36)
MCV RBC AUTO: 92.9 FL — SIGNIFICANT CHANGE UP (ref 80–100)
PHOSPHATE SERPL-MCNC: 3 MG/DL — SIGNIFICANT CHANGE UP (ref 2.5–4.5)
PLATELET # BLD AUTO: 183 K/UL — SIGNIFICANT CHANGE UP (ref 150–400)
POTASSIUM SERPL-MCNC: 4.7 MMOL/L — SIGNIFICANT CHANGE UP (ref 3.5–5.3)
POTASSIUM SERPL-SCNC: 4.7 MMOL/L — SIGNIFICANT CHANGE UP (ref 3.5–5.3)
RBC # BLD: 3.51 M/UL — LOW (ref 3.8–5.2)
RBC # FLD: 15.5 % — SIGNIFICANT CHANGE UP (ref 10.3–16.9)
SODIUM SERPL-SCNC: 140 MMOL/L — SIGNIFICANT CHANGE UP (ref 135–145)
WBC # BLD: 4.1 K/UL — SIGNIFICANT CHANGE UP (ref 3.8–10.5)
WBC # FLD AUTO: 4.1 K/UL — SIGNIFICANT CHANGE UP (ref 3.8–10.5)

## 2018-03-26 RX ORDER — HYDROMORPHONE HYDROCHLORIDE 2 MG/ML
0.5 INJECTION INTRAMUSCULAR; INTRAVENOUS; SUBCUTANEOUS EVERY 4 HOURS
Qty: 0 | Refills: 0 | Status: DISCONTINUED | OUTPATIENT
Start: 2018-03-26 | End: 2018-03-31

## 2018-03-26 RX ORDER — GABAPENTIN 400 MG/1
200 CAPSULE ORAL THREE TIMES A DAY
Qty: 0 | Refills: 0 | Status: DISCONTINUED | OUTPATIENT
Start: 2018-03-26 | End: 2018-04-01

## 2018-03-26 RX ADMIN — SIMVASTATIN 40 MILLIGRAM(S): 20 TABLET, FILM COATED ORAL at 21:57

## 2018-03-26 RX ADMIN — Medication 4: at 21:58

## 2018-03-26 RX ADMIN — HEPARIN SODIUM 5000 UNIT(S): 5000 INJECTION INTRAVENOUS; SUBCUTANEOUS at 14:00

## 2018-03-26 RX ADMIN — SENNA PLUS 2 TABLET(S): 8.6 TABLET ORAL at 21:57

## 2018-03-26 RX ADMIN — Medication 100 MILLIGRAM(S): at 13:29

## 2018-03-26 RX ADMIN — GABAPENTIN 200 MILLIGRAM(S): 400 CAPSULE ORAL at 21:57

## 2018-03-26 RX ADMIN — OXYCODONE AND ACETAMINOPHEN 2 TABLET(S): 5; 325 TABLET ORAL at 08:44

## 2018-03-26 RX ADMIN — Medication 100 MILLIGRAM(S): at 06:15

## 2018-03-26 RX ADMIN — OXYCODONE AND ACETAMINOPHEN 2 TABLET(S): 5; 325 TABLET ORAL at 23:10

## 2018-03-26 RX ADMIN — Medication 2: at 16:55

## 2018-03-26 RX ADMIN — HEPARIN SODIUM 5000 UNIT(S): 5000 INJECTION INTRAVENOUS; SUBCUTANEOUS at 06:15

## 2018-03-26 RX ADMIN — Medication 4: at 11:53

## 2018-03-26 RX ADMIN — OXYCODONE AND ACETAMINOPHEN 2 TABLET(S): 5; 325 TABLET ORAL at 00:11

## 2018-03-26 RX ADMIN — OXYCODONE AND ACETAMINOPHEN 2 TABLET(S): 5; 325 TABLET ORAL at 03:54

## 2018-03-26 RX ADMIN — SODIUM CHLORIDE 3 MILLILITER(S): 9 INJECTION INTRAMUSCULAR; INTRAVENOUS; SUBCUTANEOUS at 06:15

## 2018-03-26 RX ADMIN — PIPERACILLIN AND TAZOBACTAM 200 GRAM(S): 4; .5 INJECTION, POWDER, LYOPHILIZED, FOR SOLUTION INTRAVENOUS at 13:21

## 2018-03-26 RX ADMIN — Medication 100 MILLIGRAM(S): at 21:58

## 2018-03-26 RX ADMIN — SODIUM CHLORIDE 3 MILLILITER(S): 9 INJECTION INTRAMUSCULAR; INTRAVENOUS; SUBCUTANEOUS at 22:00

## 2018-03-26 RX ADMIN — PIPERACILLIN AND TAZOBACTAM 200 GRAM(S): 4; .5 INJECTION, POWDER, LYOPHILIZED, FOR SOLUTION INTRAVENOUS at 06:19

## 2018-03-26 RX ADMIN — PIPERACILLIN AND TAZOBACTAM 200 GRAM(S): 4; .5 INJECTION, POWDER, LYOPHILIZED, FOR SOLUTION INTRAVENOUS at 19:00

## 2018-03-26 RX ADMIN — Medication 2: at 06:40

## 2018-03-26 RX ADMIN — SODIUM CHLORIDE 3 MILLILITER(S): 9 INJECTION INTRAMUSCULAR; INTRAVENOUS; SUBCUTANEOUS at 13:21

## 2018-03-26 RX ADMIN — Medication 1 APPLICATION(S): at 11:53

## 2018-03-26 RX ADMIN — OXYCODONE AND ACETAMINOPHEN 2 TABLET(S): 5; 325 TABLET ORAL at 17:20

## 2018-03-26 RX ADMIN — PIPERACILLIN AND TAZOBACTAM 200 GRAM(S): 4; .5 INJECTION, POWDER, LYOPHILIZED, FOR SOLUTION INTRAVENOUS at 01:00

## 2018-03-26 RX ADMIN — OXYCODONE AND ACETAMINOPHEN 2 TABLET(S): 5; 325 TABLET ORAL at 10:52

## 2018-03-26 RX ADMIN — Medication 50 MILLIGRAM(S): at 13:30

## 2018-03-26 RX ADMIN — LOSARTAN POTASSIUM 100 MILLIGRAM(S): 100 TABLET, FILM COATED ORAL at 06:19

## 2018-03-26 RX ADMIN — OXYCODONE AND ACETAMINOPHEN 2 TABLET(S): 5; 325 TABLET ORAL at 22:35

## 2018-03-26 RX ADMIN — OXYCODONE AND ACETAMINOPHEN 2 TABLET(S): 5; 325 TABLET ORAL at 18:18

## 2018-03-26 RX ADMIN — HEPARIN SODIUM 5000 UNIT(S): 5000 INJECTION INTRAVENOUS; SUBCUTANEOUS at 21:58

## 2018-03-26 RX ADMIN — OXYCODONE AND ACETAMINOPHEN 2 TABLET(S): 5; 325 TABLET ORAL at 08:58

## 2018-03-26 NOTE — PROGRESS NOTE ADULT - SUBJECTIVE AND OBJECTIVE BOX
O/N: ROXANN  3/25: c/o constant pain - started on Gabapentin. Cultures growing pseudomonas - ZOsyn only, DCed vancomycin         69 yo F with PMHx of DM, HTN, HLD who presents c/o worsening pain and ulceration of the left lateral malleolus after failed outpatient therapy. Pt is admitted to vascular surgery for IV abx treatment and wound management.      - Problem: Infected ulcer of skin.  Plan: Zosyn IV  - DVT ppx  - Home meds  -Diabetic diet/ISS  -Pain control  -Daily local wound care  -AM labs O/N: ROXANN  3/26 c/o constant pain - started on Lyrica Cultures growing pseudomonas - ZOsyn only, DCed vancomycin       piperacillin/tazobactam IVPB. 2.25  heparin  Injectable 5000  losartan 100  piperacillin/tazobactam IVPB. 2.25      Allergies    No Known Allergies    Intolerances    codeine (Vomiting)      Vital Signs Last 24 Hrs  T(C): 36.1 (26 Mar 2018 09:30), Max: 37.3 (26 Mar 2018 01:56)  T(F): 96.9 (26 Mar 2018 09:30), Max: 99.1 (26 Mar 2018 01:56)  HR: 65 (26 Mar 2018 08:59) (50 - 65)  BP: 137/63 (26 Mar 2018 08:59) (106/52 - 137/63)  BP(mean): --  RR: 16 (26 Mar 2018 08:59) (16 - 18)  SpO2: 98% (26 Mar 2018 08:59) (98% - 100%)  I&O's Summary    25 Mar 2018 07:01  -  26 Mar 2018 07:00  --------------------------------------------------------  IN: 360 mL / OUT: 0 mL / NET: 360 mL    26 Mar 2018 07:01  -  26 Mar 2018 11:19  --------------------------------------------------------  IN: 60 mL / OUT: 0 mL / NET: 60 mL        Physical Exam:  General:  Pt AXOX3 in NAD  Pulmonary:  Non labored breathing  Cardiovascular:  S1S2  Extremities: L lateral wound healing well    LABS:                        9.7    4.1   )-----------( 183      ( 26 Mar 2018 07:10 )             32.6     03-26    140  |  105  |  11  ----------------------------<  177<H>  4.7   |  25  |  0.97    Ca    9.3      26 Mar 2018 07:10  Phos  3.0     03-26  Mg     1.9     03-26          Radiology and Additional Studies:      67 yo F with PMHx of DM, HTN, HLD who presents c/o worsening pain and ulceration of the left lateral malleolus after failed outpatient therapy. Pt is admitted to vascular surgery for IV abx treatment and wound management.      - Problem: Infected ulcer of skin.  Plan: Zosyn IV  - DVT ppx  - Home meds  -Diabetic diet/ISS  -Pain control  -Daily local wound care  - Possible dc home today if pain controlled O/N: ROXANN  3/26 c/o constant pain - started on Lyrica Cultures growing pseudomonas - ZOsyn only, DCed vancomycin       piperacillin/tazobactam IVPB. 2.25  heparin  Injectable 5000  losartan 100  piperacillin/tazobactam IVPB. 2.25      Allergies    No Known Allergies    Intolerances    codeine (Vomiting)      Vital Signs Last 24 Hrs  T(C): 36.1 (26 Mar 2018 09:30), Max: 37.3 (26 Mar 2018 01:56)  T(F): 96.9 (26 Mar 2018 09:30), Max: 99.1 (26 Mar 2018 01:56)  HR: 65 (26 Mar 2018 08:59) (50 - 65)  BP: 137/63 (26 Mar 2018 08:59) (106/52 - 137/63)  BP(mean): --  RR: 16 (26 Mar 2018 08:59) (16 - 18)  SpO2: 98% (26 Mar 2018 08:59) (98% - 100%)  I&O's Summary    25 Mar 2018 07:01  -  26 Mar 2018 07:00  --------------------------------------------------------  IN: 360 mL / OUT: 0 mL / NET: 360 mL    26 Mar 2018 07:01  -  26 Mar 2018 11:19  --------------------------------------------------------  IN: 60 mL / OUT: 0 mL / NET: 60 mL        Physical Exam:  General:  Pt AXOX3 in NAD  Pulmonary:  Non labored breathing  Cardiovascular:  S1S2  Extremities: L lateral wound healing well    LABS:                        9.7    4.1   )-----------( 183      ( 26 Mar 2018 07:10 )             32.6     03-26    140  |  105  |  11  ----------------------------<  177<H>  4.7   |  25  |  0.97    Ca    9.3      26 Mar 2018 07:10  Phos  3.0     03-26  Mg     1.9     03-26          Radiology and Additional Studies:      69 yo F with PMHx of DM, HTN, HLD who presents c/o worsening pain and ulceration of the left lateral malleolus after failed outpatient therapy. Pt is admitted to vascular surgery for IV abx treatment and wound management.  Hyponatremia    - Problem: Infected ulcer of skin.  Plan: Zosyn IV  - DVT ppx  - Home meds  -Diabetic diet/ISS  -Pain control  -Daily local wound care  - Possible dc home today if pain controlled

## 2018-03-27 ENCOUNTER — TRANSCRIPTION ENCOUNTER (OUTPATIENT)
Age: 68
End: 2018-03-27

## 2018-03-27 LAB
-  LEVOFLOXACIN: SIGNIFICANT CHANGE UP
GLUCOSE BLDC GLUCOMTR-MCNC: 158 MG/DL — HIGH (ref 70–99)
GLUCOSE BLDC GLUCOMTR-MCNC: 180 MG/DL — HIGH (ref 70–99)
GLUCOSE BLDC GLUCOMTR-MCNC: 205 MG/DL — HIGH (ref 70–99)
GLUCOSE BLDC GLUCOMTR-MCNC: 371 MG/DL — HIGH (ref 70–99)

## 2018-03-27 PROCEDURE — 99254 IP/OBS CNSLTJ NEW/EST MOD 60: CPT | Mod: GC

## 2018-03-27 RX ORDER — DOCUSATE SODIUM 100 MG
1 CAPSULE ORAL
Qty: 0 | Refills: 0 | COMMUNITY
Start: 2018-03-27

## 2018-03-27 RX ORDER — GABAPENTIN 400 MG/1
2 CAPSULE ORAL
Qty: 0 | Refills: 0 | COMMUNITY
Start: 2018-03-27

## 2018-03-27 RX ORDER — SENNA PLUS 8.6 MG/1
2 TABLET ORAL
Qty: 0 | Refills: 0 | COMMUNITY
Start: 2018-03-27

## 2018-03-27 RX ORDER — SENNA PLUS 8.6 MG/1
2 TABLET ORAL AT BEDTIME
Qty: 0 | Refills: 0 | Status: DISCONTINUED | OUTPATIENT
Start: 2018-03-27 | End: 2018-04-05

## 2018-03-27 RX ORDER — PIPERACILLIN AND TAZOBACTAM 4; .5 G/20ML; G/20ML
4.5 INJECTION, POWDER, LYOPHILIZED, FOR SOLUTION INTRAVENOUS EVERY 6 HOURS
Qty: 0 | Refills: 0 | Status: DISCONTINUED | OUTPATIENT
Start: 2018-03-27 | End: 2018-04-03

## 2018-03-27 RX ADMIN — GABAPENTIN 200 MILLIGRAM(S): 400 CAPSULE ORAL at 05:52

## 2018-03-27 RX ADMIN — SIMVASTATIN 40 MILLIGRAM(S): 20 TABLET, FILM COATED ORAL at 21:44

## 2018-03-27 RX ADMIN — Medication 10: at 21:45

## 2018-03-27 RX ADMIN — PIPERACILLIN AND TAZOBACTAM 200 GRAM(S): 4; .5 INJECTION, POWDER, LYOPHILIZED, FOR SOLUTION INTRAVENOUS at 07:04

## 2018-03-27 RX ADMIN — PIPERACILLIN AND TAZOBACTAM 200 GRAM(S): 4; .5 INJECTION, POWDER, LYOPHILIZED, FOR SOLUTION INTRAVENOUS at 13:00

## 2018-03-27 RX ADMIN — OXYCODONE AND ACETAMINOPHEN 2 TABLET(S): 5; 325 TABLET ORAL at 18:30

## 2018-03-27 RX ADMIN — SENNA PLUS 2 TABLET(S): 8.6 TABLET ORAL at 21:44

## 2018-03-27 RX ADMIN — GABAPENTIN 200 MILLIGRAM(S): 400 CAPSULE ORAL at 21:44

## 2018-03-27 RX ADMIN — OXYCODONE AND ACETAMINOPHEN 2 TABLET(S): 5; 325 TABLET ORAL at 21:49

## 2018-03-27 RX ADMIN — GABAPENTIN 200 MILLIGRAM(S): 400 CAPSULE ORAL at 14:04

## 2018-03-27 RX ADMIN — SODIUM CHLORIDE 3 MILLILITER(S): 9 INJECTION INTRAMUSCULAR; INTRAVENOUS; SUBCUTANEOUS at 05:56

## 2018-03-27 RX ADMIN — SODIUM CHLORIDE 3 MILLILITER(S): 9 INJECTION INTRAMUSCULAR; INTRAVENOUS; SUBCUTANEOUS at 21:39

## 2018-03-27 RX ADMIN — Medication 2: at 07:28

## 2018-03-27 RX ADMIN — HEPARIN SODIUM 5000 UNIT(S): 5000 INJECTION INTRAVENOUS; SUBCUTANEOUS at 05:52

## 2018-03-27 RX ADMIN — PIPERACILLIN AND TAZOBACTAM 100 GRAM(S): 4; .5 INJECTION, POWDER, LYOPHILIZED, FOR SOLUTION INTRAVENOUS at 18:00

## 2018-03-27 RX ADMIN — OXYCODONE AND ACETAMINOPHEN 2 TABLET(S): 5; 325 TABLET ORAL at 10:59

## 2018-03-27 RX ADMIN — SODIUM CHLORIDE 3 MILLILITER(S): 9 INJECTION INTRAMUSCULAR; INTRAVENOUS; SUBCUTANEOUS at 12:38

## 2018-03-27 RX ADMIN — OXYCODONE AND ACETAMINOPHEN 2 TABLET(S): 5; 325 TABLET ORAL at 08:53

## 2018-03-27 RX ADMIN — HEPARIN SODIUM 5000 UNIT(S): 5000 INJECTION INTRAVENOUS; SUBCUTANEOUS at 21:45

## 2018-03-27 RX ADMIN — Medication 100 MILLIGRAM(S): at 21:44

## 2018-03-27 RX ADMIN — PIPERACILLIN AND TAZOBACTAM 200 GRAM(S): 4; .5 INJECTION, POWDER, LYOPHILIZED, FOR SOLUTION INTRAVENOUS at 00:51

## 2018-03-27 RX ADMIN — LOSARTAN POTASSIUM 100 MILLIGRAM(S): 100 TABLET, FILM COATED ORAL at 05:52

## 2018-03-27 RX ADMIN — OXYCODONE AND ACETAMINOPHEN 2 TABLET(S): 5; 325 TABLET ORAL at 22:15

## 2018-03-27 RX ADMIN — OXYCODONE AND ACETAMINOPHEN 2 TABLET(S): 5; 325 TABLET ORAL at 17:12

## 2018-03-27 RX ADMIN — Medication 2: at 11:42

## 2018-03-27 RX ADMIN — Medication 100 MILLIGRAM(S): at 05:52

## 2018-03-27 RX ADMIN — Medication 4: at 16:35

## 2018-03-27 RX ADMIN — Medication 100 MILLIGRAM(S): at 14:04

## 2018-03-27 NOTE — DISCHARGE NOTE ADULT - PLAN OF CARE
Activity: Ambulate as tolerated daily. Keep legs elevated when resting.  Wound Care: Wash wound with soap and water, pat dry. Apply saline moistened gauze to wound. Wrap with kerlix and ace bandage Daily. Follow up Dr Bonilla 1 - 2 weeks after discharge. Call office for appointment. Office: [phone]. Call office for temperature > 101.4. Redness or drainage from wound. Heal left leg ulcer.

## 2018-03-27 NOTE — DISCHARGE NOTE ADULT - NS AS ACTIVITY OBS
Do not drive or operate machinery/Stairs allowed/Walking-Outdoors allowed/Showering allowed/Walking-Indoors allowed

## 2018-03-27 NOTE — PROGRESS NOTE ADULT - SUBJECTIVE AND OBJECTIVE BOX
24hr events:  O/N: JAVIER HACKETTS  3/26: Chg'd from Gabapentin to Lyrica back to Gabapentin      Assessment/Plan;  67 yo F with PMHx of DM, HTN, HLD who presents c/o worsening pain and ulceration of the left lateral malleolus after failed outpatient therapy. Pt is admitted to vascular surgery for IV abx treatment and wound management.  Hyponatremia    - Problem: Infected ulcer of skin.  Plan: Zosyn IV  - DVT ppx  - Home meds  -Diabetic diet/ISS  -Pain control  -Daily local wound care  - Possible dc home today if pain controlled 24hr events:  O/N: ROXANN, JAVIERS  3/26: Chg'd from Gabapentin to Lyrica back to Gabapentin  S. Pain better.    piperacillin/tazobactam IVPB. 2.25  heparin  Injectable 5000  losartan 100  piperacillin/tazobactam IVPB. 2.25      Allergies    No Known Allergies    Intolerances    codeine (Vomiting)      Vital Signs Last 24 Hrs  T(C): 35.7 (27 Mar 2018 06:08), Max: 36.1 (26 Mar 2018 09:30)  T(F): 96.2 (27 Mar 2018 06:08), Max: 96.9 (26 Mar 2018 09:30)  HR: 46 (27 Mar 2018 05:47) (46 - 65)  BP: 135/60 (27 Mar 2018 05:47) (111/59 - 140/63)  BP(mean): --  RR: 16 (27 Mar 2018 05:47) (16 - 18)  SpO2: 100% (27 Mar 2018 05:47) (97% - 100%)    Physical Exam:  General: awake, alert, NAD  Pulmonary:  Cardiovascular:  Abdominal:  Extremities: Left leg - no edema. Lateral malleolus wound dry. No erythema or drainage.   Pulses:   Right:                                                                           Left:  FEM [ ]2+ [ ]1+ [ ] doppler                                            FEM [ ]2+ [ ]1+ [ ] doppler    POP [ ]2+ [ ]1+ [ ] doppler                                            POP [ ]2+ [ ]1+ [ ] doppler    DP [ ]2+ [ ]1+ [ ] doppler                                               DP [ ]2+ [ ]1+ [ ] doppler  PT[ ]2+ [ ]1+ [ ] doppler                                                 PT [ ]2+ [ ]1+ [ ] doppler      LABS:                        9.7    4.1   )-----------( 183      ( 26 Mar 2018 07:10 )             32.6     03-26    140  |  105  |  11  ----------------------------<  177<H>  4.7   |  25  |  0.97    Ca    9.3      26 Mar 2018 07:10  Phos  3.0     03-26  Mg     1.9     03-26            RADIOLOGY & ADDITIONAL TESTS:      Assessment/Plan;  67 yo F with PMHx of DM, HTN, HLD who presents c/o worsening pain and ulceration of the left lateral malleolus after failed outpatient therapy. Pt is admitted to vascular surgery for IV abx treatment and wound management.  Hyponatremia    - d/c home today on gabapentin.  - Problem: Infected ulcer of skin.  Plan: Zosyn IV  - DVT ppx  - Home meds  -Diabetic diet/ISS  -Pain control  -Daily local wound care

## 2018-03-27 NOTE — CONSULT NOTE ADULT - ASSESSMENT
68F PMH HTN, HLD, DM, LE varicosities admitted for LLE ulcer in need of IV abx    #L lateral malleolus ulcer - s/p 5d zosyn now improved; VSS, no leukocytosis; Wound culture growing pseudomonas w/ Cipro I2  -called lab - organism sens to Levaquin  -can switch to PO Levaquin 750mg q24h for total abx duration of 10d      Will discuss with attending. 68F PMH HTN, HLD, DM, LE varicosities admitted for LLE ulcer in need of IV abx    #L lateral malleolus ulcer - s/p 5d zosyn now improved; VSS, no leukocytosis; Wound culture growing pseudomonas w/ Cipro I2  -called lab - organism sens to Levaquin  -although sens to Levaquin, may not be as efficatious  -prefer Zosyn use, please increase to Zosyn 4.5g q6h; pt is to finish total of 10d course of abx      Discussed with attending.

## 2018-03-27 NOTE — PHYSICAL THERAPY INITIAL EVALUATION ADULT - CRITERIA FOR SKILLED THERAPEUTIC INTERVENTIONS
predicted duration of therapy intervention/rehab potential/anticipated discharge recommendation/therapy frequency/functional limitations in following categories/impairments found

## 2018-03-27 NOTE — DISCHARGE NOTE ADULT - CARE PROVIDER_API CALL
Monica Bonilla), Surgery; Vascular Surgery  [address]  [address]  [place], [place] [address]  Phone: [phone]  Fax: [phone]

## 2018-03-27 NOTE — CONSULT NOTE ADULT - ATTENDING COMMENTS
Agree with above.  Cipro is usually the most efficacious of the fluoroquinolones against pseudomonas and it is showing intermediate sensitivity which makes me question whether or not levaquin will help this patient.     Prefer to keep her on IV zosyn.  Would recommend another 5 days.  Can increase dose to 4.5 grams IV q6hrs

## 2018-03-27 NOTE — DISCHARGE NOTE ADULT - PATIENT PORTAL LINK FT
You can access the [facility] Patient Portal, offered by [facility], by registering with the following website: [email or web address]

## 2018-03-27 NOTE — DISCHARGE NOTE ADULT - MEDICATION SUMMARY - MEDICATIONS TO CHANGE
I will SWITCH the dose or number of times a day I take the medications listed below when I get home from the hospital:    simvastatin 40 mg oral tablet  -- 1 tab(s) by mouth once a day (at bedtime)    glipiZIDE 5 mg oral tablet  -- 1 tab(s) by mouth once a day    irbesartan  -- 300 milligram(s) by mouth once a day    metFORMIN 500 mg oral tablet, extended release  -- 1 tab(s) by mouth once a day    pioglitazone  -- 45 milligram(s) by mouth once a day    Invokana 100 mg oral tablet  -- 1 tab(s) by mouth once a day

## 2018-03-27 NOTE — PHYSICAL THERAPY INITIAL EVALUATION ADULT - GAIT DEVIATIONS NOTED, PT EVAL
decreased jhoan/increased time in double stance/decreased step length/decreased weight-shifting ability/decreased stride length

## 2018-03-27 NOTE — CONSULT NOTE ADULT - SUBJECTIVE AND OBJECTIVE BOX
INFECTIOUS DISEASE SERVICE INITIAL CONSULT NOTE    HPI:  Attending: [facility] course:    68F PMH HTN, HLD, DM, LE varicosities who had "burst" varicosity while in shower 1mo ago initially tx-ed as OP w/ various PO regimens including Keflex, Bactrim, and Amoxicillin. Pt has noted worsening sx w/ increased pain associated w/ subjective fever/chills, purulence. Pt admitted for IV abx to vascular service with initiation of vanc/zosyn. Wound culture done growing pseudomonas aereuginosa, vanc d/c-ed. Is on 5th day of zosyn w/ improvement to ulcer. Sensitivities showing Cipro as I2. ID called for possible switch to PO abx prior to d/c.    ROS: denies fever/chills/sweats, LE edema/erythema/drainage at this time, nausea/vomiting, abdominal pain, diarrhea/constipation, chest pain/pressure, palpitations, dyspnea, headache, dysuria      PAST MEDICAL & SURGICAL HISTORY:  Varicose vein of leg  Type 2 diabetes mellitus without complication, without long-term current use of insulin  Essential hypertension  Hyperlipidemia, unspecified hyperlipidemia type  No significant past surgical history      Home Medications:          Vitals (Last 12 Hours)  T(F): 98.2 (03-23-18 @ 13:50), Max: 98.2 (03-23-18 @ 13:50)  HR:  (54 - 61)  BP:  (103/58 - 103/61)  RR:  (18 - 18)  SpO2:  (96% - 96%)        LABS:                          11.4   3.8   )-----------( 202      ( 23 Mar 2018 12:13 )             36.5     03-23    133<L>  |  94<L>  |  28<H>  ----------------------------<  218<H>  5.5<H>   |  22  |  1.93<H>    Ca    10.4      23 Mar 2018 12:12    TPro  8.1  /  Alb  4.4  /  TBili  0.4  /  DBili  x   /  AST  23  /  ALT  17  /  AlkPhos  89  03-23        Microbiology:    Culture - Other (03.23.18 @ 16:09)    Gram Stain:   Few Gram Negative Rods  Rare White blood cells    -  Aztreonam: I    -  Aztreonam: R >16    -  Ceftazidime: S    -  Ceftazidime: S 4    -  Ciprofloxacin: I 2    -  Gentamicin: S    -  Gentamicin: S <=4    -  Levofloxacin: S <=2    -  Piperacillin/Tazobactam: S    -  Piperacillin/Tazobactam: S <=16    -  Tobramycin: S    -  Tobramycin: S <=4    -  Ticarcillin/Clavulanate: R    Specimen Source: .Other left ankle wound culture    Culture Results:   Numerous Pseudomonas aeruginosa  Moderate Coag Negative Staphylococcus    Organism Identification: Pseudomonas aeruginosa  Pseudomonas aeruginosa    Organism: Pseudomonas aeruginosa    Organism: Pseudomonas aeruginosa    Method Type: KB    Method Type: RUBY        RADIOLOGY & ADDITIONAL STUDIES: (23 Mar 2018 14:16)      ADDITIONAL ID HPI:    PAST MEDICAL & SURGICAL HISTORY:  Varicose vein of leg  Type 2 diabetes mellitus without complication, without long-term current use of insulin  Essential hypertension  Hyperlipidemia, unspecified hyperlipidemia type  No significant past surgical history      REVIEW OF SYSTEMS:  Otherwise negative other than what is stated in the HPI.    ACTIVE ANTIMICROBIAL/ANTIBIOTIC MEDICATIONS:  piperacillin/tazobactam IVPB. 2.25 Gram(s) IV Intermittent every 6 hours      PRIOR ANTIMICROBIAL HISTORY ON THIS ADMISSION:  piperacillin/tazobactam IVPB.   200 mL/Hr IV Intermittent (03-27-18 @ 07:04)   200 mL/Hr IV Intermittent (03-27-18 @ 00:51)   200 mL/Hr IV Intermittent (03-26-18 @ 19:00)   200 mL/Hr IV Intermittent (03-26-18 @ 13:21)   200 mL/Hr IV Intermittent (03-26-18 @ 06:19)   200 mL/Hr IV Intermittent (03-26-18 @ 01:00)   200 mL/Hr IV Intermittent (03-25-18 @ 18:16)   200 mL/Hr IV Intermittent (03-25-18 @ 13:01)   200 mL/Hr IV Intermittent (03-25-18 @ 06:58)   200 mL/Hr IV Intermittent (03-25-18 @ 01:00)   200 mL/Hr IV Intermittent (03-24-18 @ 18:13)   200 mL/Hr IV Intermittent (03-24-18 @ 13:38)   200 mL/Hr IV Intermittent (03-24-18 @ 06:02)   200 mL/Hr IV Intermittent (03-24-18 @ 00:54)   200 mL/Hr IV Intermittent (03-23-18 @ 19:14)    piperacillin/tazobactam IVPB.   200 mL/Hr IV Intermittent (03-23-18 @ 13:47)    vancomycin  IVPB   250 mL/Hr IV Intermittent (03-23-18 @ 14:38)    vancomycin  IVPB   250 mL/Hr IV Intermittent (03-24-18 @ 14:39)        OTHER MEDICATIONS:  collagenase Ointment 1 Application(s) Topical daily  dextrose 5%. 1000 milliLiter(s) IV Continuous <Continuous>  dextrose 50% Injectable 12.5 Gram(s) IV Push once  dextrose 50% Injectable 25 Gram(s) IV Push once  dextrose 50% Injectable 25 Gram(s) IV Push once  dextrose Gel 1 Dose(s) Oral once PRN  docusate sodium 100 milliGRAM(s) Oral three times a day  gabapentin 200 milliGRAM(s) Oral three times a day  glucagon  Injectable 1 milliGRAM(s) IntraMuscular once PRN  heparin  Injectable 5000 Unit(s) SubCutaneous every 8 hours  HYDROmorphone  Injectable 0.5 milliGRAM(s) IV Push every 4 hours PRN  insulin lispro (HumaLOG) corrective regimen sliding scale   SubCutaneous Before meals and at bedtime  losartan 100 milliGRAM(s) Oral daily  ondansetron Injectable 4 milliGRAM(s) IV Push once  oxyCODONE    5 mG/acetaminophen 325 mG 2 Tablet(s) Oral every 4 hours PRN  oxyCODONE    5 mG/acetaminophen 325 mG 1 Tablet(s) Oral every 4 hours PRN  senna 2 Tablet(s) Oral at bedtime  simvastatin 40 milliGRAM(s) Oral at bedtime  sodium chloride 0.9% lock flush 3 milliLiter(s) IV Push every 8 hours      ALLERGIES:  Allergies    No Known Allergies    Intolerances    codeine (Vomiting)      SOCIAL HISTORY:    FAMILY HISTORY:  No pertinent family history in first degree relatives      VITAL SIGNS:  ICU Vital Signs Last 24 Hrs  T(C): 36.6 (27 Mar 2018 09:09), Max: 36.6 (27 Mar 2018 09:09)  T(F): 97.8 (27 Mar 2018 09:09), Max: 97.8 (27 Mar 2018 09:09)  HR: 51 (27 Mar 2018 10:52) (46 - 64)  BP: 148/69 (27 Mar 2018 10:52) (111/59 - 148/69)  BP(mean): --  ABP: --  ABP(mean): --  RR: 16 (27 Mar 2018 08:56) (16 - 18)  SpO2: 100% (27 Mar 2018 08:56) (97% - 100%)      PHYSICAL EXAM:  General: NAD, wdwn, AxOx3  Pulmonary: CTA b/l, no wheezes/rhonchi/rales  Cardiovascular: RRR, S1/S2 present, no M/R/G  Abdominal: BS present, soft, NTND  Extremities: warm, hyperpigmentation of the bilateral lower extremities, L lateral malleolus 3x4 cm shallow ulcer w/o purulence, palpable pulses b/l LE    LABS:                        9.7    4.1   )-----------( 183      ( 26 Mar 2018 07:10 )             32.6     03-26    140  |  105  |  11  ----------------------------<  177<H>  4.7   |  25  |  0.97    Ca    9.3      26 Mar 2018 07:10  Phos  3.0     03-26  Mg     1.9     03-26            MICROBIOLOGY:    Culture - Urine (collected 03-24-18 @ 12:00)  Source: .Urine None  Final Report (03-25-18 @ 09:02):    No growth    Culture - Other (collected 03-23-18 @ 16:09)  Source: .Other left ankle wound culture  Gram Stain (03-23-18 @ 20:31):    Few Gram Negative Rods    Rare White blood cells  Final Report (03-25-18 @ 12:56):    Numerous Pseudomonas aeruginosa    Moderate Coag Negative Staphylococcus  Organism: Pseudomonas aeruginosa  Pseudomonas aeruginosa (03-25-18 @ 12:56)  Organism: Pseudomonas aeruginosa (03-25-18 @ 12:56)      -  Aztreonam: I      -  Ceftazidime: S      -  Gentamicin: S      -  Piperacillin/Tazobactam: S      -  Ticarcillin/Clavulanate: R      -  Tobramycin: S      Method Type: KB  Organism: Pseudomonas aeruginosa (03-25-18 @ 12:56)      -  Aztreonam: R >16      -  Ceftazidime: S 4      -  Ciprofloxacin: I 2      -  Gentamicin: S <=4      -  Piperacillin/Tazobactam: S <=16      -  Tobramycin: S <=4      Method Type: RUBY    Culture - Blood (collected 03-23-18 @ 12:55)  Source: .Blood Blood  Preliminary Report (03-26-18 @ 13:03):    No growth at 3 days.    Culture - Blood (collected 03-23-18 @ 12:55)  Source: .Blood Blood  Preliminary Report (03-26-18 @ 13:03):    No growth at 3 days.        RADIOLOGY & ADDITIONAL STUDIES:

## 2018-03-27 NOTE — DISCHARGE NOTE ADULT - MEDICATION SUMMARY - MEDICATIONS TO TAKE
I will START or STAY ON the medications listed below when I get home from the hospital:    oxyCODONE-acetaminophen 5 mg-325 mg oral tablet  -- 1 tab(s) by mouth every 6 hours, As Needed -for severe pain MDD:4   -- Caution federal law prohibits the transfer of this drug to any person other  than the person for whom it was prescribed.  May cause drowsiness.  Alcohol may intensify this effect.  Use care when operating dangerous machinery.  This prescription cannot be refilled.  This product contains acetaminophen.  Do not use  with any other product containing acetaminophen to prevent possible liver damage.  Using more of this medication than prescribed may cause serious breathing problems.    -- Indication: For left lateral malleolar ulcer pain    Cozaar 100 mg oral tablet  -- 1 tab(s) by mouth once a day  -- Indication: For Essential hypertension    irbesartan  -- 300 milligram(s) by mouth once a day  -- Indication: For Essential hypertension    gabapentin 300 mg oral capsule  -- 1 cap(s) by mouth 3 times a day  -- Indication: For Type 2 diabetes mellitus without complication, without long-term current use of insulin    glipiZIDE 5 mg oral tablet  -- 1 tab(s) by mouth once a day  -- Indication: For Type 2 diabetes mellitus without complication, without long-term current use of insulin    metFORMIN 500 mg oral tablet, extended release  -- 1 tab(s) by mouth once a day  -- Indication: For Type 2 diabetes mellitus without complication, without long-term current use of insulin    pioglitazone  -- 45 milligram(s) by mouth once a day  -- Indication: For Type 2 diabetes mellitus without complication, without long-term current use of insulin    Invokana 100 mg oral tablet  -- 1 tab(s) by mouth once a day  -- Indication: For Type 2 diabetes mellitus without complication, without long-term current use of insulin    simvastatin 40 mg oral tablet  -- 1 tab(s) by mouth once a day (at bedtime)  -- Indication: For Hyperlipidemia, unspecified hyperlipidemia type    senna oral tablet  -- 2 tab(s) by mouth once a day (at bedtime)  -- Indication: For constipation I will START or STAY ON the medications listed below when I get home from the hospital:    oxyCODONE-acetaminophen 5 mg-325 mg oral tablet  -- 1 tab(s) by mouth every 6 hours, As Needed -for severe pain MDD:4   -- Caution federal law prohibits the transfer of this drug to any person other  than the person for whom it was prescribed.  May cause drowsiness.  Alcohol may intensify this effect.  Use care when operating dangerous machinery.  This prescription cannot be refilled.  This product contains acetaminophen.  Do not use  with any other product containing acetaminophen to prevent possible liver damage.  Using more of this medication than prescribed may cause serious breathing problems.    -- Indication: For left lateral malleolar ulcer pain    Cozaar 100 mg oral tablet  -- 1 tab(s) by mouth once a day  -- Indication: For Essential hypertension    irbesartan  -- 300 milligram(s) by mouth once a day  -- Indication: For Essential hypertension    gabapentin 300 mg oral capsule  -- 1 cap(s) by mouth 3 times a day  -- Indication: For Pain control for neuropathy    glipiZIDE 5 mg oral tablet  -- 1 tab(s) by mouth once a day  -- Indication: For Type 2 diabetes mellitus without complication, without long-term current use of insulin    metFORMIN 500 mg oral tablet, extended release  -- 1 tab(s) by mouth once a day  -- Indication: For Type 2 diabetes mellitus without complication, without long-term current use of insulin    pioglitazone  -- 45 milligram(s) by mouth once a day  -- Indication: For Type 2 diabetes mellitus without complication, without long-term current use of insulin    Invokana 100 mg oral tablet  -- 1 tab(s) by mouth once a day  -- Indication: For Type 2 diabetes mellitus without complication, without long-term current use of insulin    simvastatin 40 mg oral tablet  -- 1 tab(s) by mouth once a day (at bedtime)  -- Indication: For Hyperlipidemia, unspecified hyperlipidemia type    senna oral tablet  -- 2 tab(s) by mouth once a day (at bedtime)  -- Indication: For constipation

## 2018-03-27 NOTE — PHYSICAL THERAPY INITIAL EVALUATION ADULT - PERTINENT HX OF CURRENT PROBLEM, REHAB EVAL
Pt is a 67 yo F with PMHx of DM, HTN, HLD who presents c/o worsening pain and ulceration of the left lateral malleolus after failed outpatient therapy. Pt is admitted to vascular surgery for IV abx treatment and wound management.

## 2018-03-27 NOTE — DISCHARGE NOTE ADULT - MEDICATION SUMMARY - MEDICATIONS TO STOP TAKING
I will STOP taking the medications listed below when I get home from the hospital:    Bactrim  mg-160 mg oral tablet  -- 2 tab(s) by mouth 2 times a day   -- Avoid prolonged or excessive exposure to direct and/or artificial sunlight while taking this medication.  Finish all this medication unless otherwise directed by prescriber.  Medication should be taken with plenty of water.

## 2018-03-27 NOTE — PHYSICAL THERAPY INITIAL EVALUATION ADULT - ASSISTIVE DEVICE FOR TRANSFER: SIT/STAND, REHAB EVAL
rolling walker/+8/10 pain sitting EOB when LLE in dependent position, SC trialed but unable to use 2/2 pain

## 2018-03-27 NOTE — DISCHARGE NOTE ADULT - CARE PLAN
Principal Discharge DX:	Infected ulcer of skin  Goal:	Heal left leg ulcer.  Assessment and plan of treatment:	Activity: Ambulate as tolerated daily. Keep legs elevated when resting.  Wound Care: Wash wound with soap and water, pat dry. Apply saline moistened gauze to wound. Wrap with kerlix and ace bandage Daily. Follow up Dr Bonilla 1 - 2 weeks after discharge. Call office for appointment. Office: [phone]. Call office for temperature > 101.4. Redness or drainage from wound.

## 2018-03-27 NOTE — PHYSICAL THERAPY INITIAL EVALUATION ADULT - ADDITIONAL COMMENTS
Pt lives in apartment building with 6 steps to enter. Pt was premorbidly Modified Independent with ambulation using SC and independent with ADLs. No HHC services. Pt has daughter, who frequently comes to assist her as needed.

## 2018-03-27 NOTE — PHYSICAL THERAPY INITIAL EVALUATION ADULT - GENERAL OBSERVATIONS, REHAB EVAL
Pt received in bed in NAD c/o 8/10 left foot pain sitting EOB with LLE in dependent position (pt received pain meds this AM and MARYSE Hernandez made aware) +heplock +LLE bandage (C/D/I).

## 2018-03-28 LAB
CULTURE RESULTS: SIGNIFICANT CHANGE UP
CULTURE RESULTS: SIGNIFICANT CHANGE UP
GLUCOSE BLDC GLUCOMTR-MCNC: 185 MG/DL — HIGH (ref 70–99)
GLUCOSE BLDC GLUCOMTR-MCNC: 269 MG/DL — HIGH (ref 70–99)
GLUCOSE BLDC GLUCOMTR-MCNC: 292 MG/DL — HIGH (ref 70–99)
GLUCOSE BLDC GLUCOMTR-MCNC: 331 MG/DL — HIGH (ref 70–99)
SPECIMEN SOURCE: SIGNIFICANT CHANGE UP
SPECIMEN SOURCE: SIGNIFICANT CHANGE UP

## 2018-03-28 PROCEDURE — 99232 SBSQ HOSP IP/OBS MODERATE 35: CPT | Mod: GC

## 2018-03-28 RX ADMIN — GABAPENTIN 200 MILLIGRAM(S): 400 CAPSULE ORAL at 06:22

## 2018-03-28 RX ADMIN — SODIUM CHLORIDE 3 MILLILITER(S): 9 INJECTION INTRAMUSCULAR; INTRAVENOUS; SUBCUTANEOUS at 22:16

## 2018-03-28 RX ADMIN — HEPARIN SODIUM 5000 UNIT(S): 5000 INJECTION INTRAVENOUS; SUBCUTANEOUS at 13:27

## 2018-03-28 RX ADMIN — Medication 100 MILLIGRAM(S): at 22:13

## 2018-03-28 RX ADMIN — OXYCODONE AND ACETAMINOPHEN 2 TABLET(S): 5; 325 TABLET ORAL at 18:47

## 2018-03-28 RX ADMIN — SIMVASTATIN 40 MILLIGRAM(S): 20 TABLET, FILM COATED ORAL at 22:19

## 2018-03-28 RX ADMIN — LOSARTAN POTASSIUM 100 MILLIGRAM(S): 100 TABLET, FILM COATED ORAL at 06:22

## 2018-03-28 RX ADMIN — OXYCODONE AND ACETAMINOPHEN 2 TABLET(S): 5; 325 TABLET ORAL at 09:29

## 2018-03-28 RX ADMIN — PIPERACILLIN AND TAZOBACTAM 100 GRAM(S): 4; .5 INJECTION, POWDER, LYOPHILIZED, FOR SOLUTION INTRAVENOUS at 17:03

## 2018-03-28 RX ADMIN — SODIUM CHLORIDE 3 MILLILITER(S): 9 INJECTION INTRAMUSCULAR; INTRAVENOUS; SUBCUTANEOUS at 06:25

## 2018-03-28 RX ADMIN — OXYCODONE AND ACETAMINOPHEN 2 TABLET(S): 5; 325 TABLET ORAL at 18:17

## 2018-03-28 RX ADMIN — HEPARIN SODIUM 5000 UNIT(S): 5000 INJECTION INTRAVENOUS; SUBCUTANEOUS at 22:13

## 2018-03-28 RX ADMIN — OXYCODONE AND ACETAMINOPHEN 2 TABLET(S): 5; 325 TABLET ORAL at 04:00

## 2018-03-28 RX ADMIN — PIPERACILLIN AND TAZOBACTAM 100 GRAM(S): 4; .5 INJECTION, POWDER, LYOPHILIZED, FOR SOLUTION INTRAVENOUS at 00:31

## 2018-03-28 RX ADMIN — Medication 8: at 22:13

## 2018-03-28 RX ADMIN — Medication 2: at 07:33

## 2018-03-28 RX ADMIN — Medication 100 MILLIGRAM(S): at 13:28

## 2018-03-28 RX ADMIN — OXYCODONE AND ACETAMINOPHEN 2 TABLET(S): 5; 325 TABLET ORAL at 03:27

## 2018-03-28 RX ADMIN — Medication 6: at 16:33

## 2018-03-28 RX ADMIN — OXYCODONE AND ACETAMINOPHEN 2 TABLET(S): 5; 325 TABLET ORAL at 10:59

## 2018-03-28 RX ADMIN — HEPARIN SODIUM 5000 UNIT(S): 5000 INJECTION INTRAVENOUS; SUBCUTANEOUS at 06:23

## 2018-03-28 RX ADMIN — Medication 6: at 11:13

## 2018-03-28 RX ADMIN — GABAPENTIN 200 MILLIGRAM(S): 400 CAPSULE ORAL at 22:13

## 2018-03-28 RX ADMIN — PIPERACILLIN AND TAZOBACTAM 100 GRAM(S): 4; .5 INJECTION, POWDER, LYOPHILIZED, FOR SOLUTION INTRAVENOUS at 11:30

## 2018-03-28 RX ADMIN — PIPERACILLIN AND TAZOBACTAM 100 GRAM(S): 4; .5 INJECTION, POWDER, LYOPHILIZED, FOR SOLUTION INTRAVENOUS at 23:08

## 2018-03-28 RX ADMIN — Medication 100 MILLIGRAM(S): at 06:22

## 2018-03-28 RX ADMIN — SENNA PLUS 2 TABLET(S): 8.6 TABLET ORAL at 22:13

## 2018-03-28 RX ADMIN — GABAPENTIN 200 MILLIGRAM(S): 400 CAPSULE ORAL at 13:27

## 2018-03-28 RX ADMIN — PIPERACILLIN AND TAZOBACTAM 100 GRAM(S): 4; .5 INJECTION, POWDER, LYOPHILIZED, FOR SOLUTION INTRAVENOUS at 06:23

## 2018-03-28 RX ADMIN — SODIUM CHLORIDE 3 MILLILITER(S): 9 INJECTION INTRAMUSCULAR; INTRAVENOUS; SUBCUTANEOUS at 13:10

## 2018-03-28 NOTE — DIETITIAN INITIAL EVALUATION ADULT. - OTHER INFO
67 yo/female with PMHx HTN, HLD, DM admitted with L. lateral malleolar pain/ulceration. Pt seen in room, awake, alert, pleasant. Pt endorses usually having a good appetite PTA, but has had decreased PO intake 2/2 pain for the last month. Currently w/improving appetite since beginning of admission- 100% intake at dinner last night reported. No N/V/C/D reported at this time. Last BM 3/23- on bowel regimen. NKFA. No difficulty chewing or swallowing. Skin noted w/ L. heel wound. Pain well controlled. Consistent Carbohydrate Diet reviewed w/patient.

## 2018-03-28 NOTE — PROGRESS NOTE ADULT - ASSESSMENT
68F PMH HTN, HLD, DM, LE varicosities admitted for LLE ulcer in need of IV abx    #L lateral malleolus ulcer - s/p 5d zosyn now improved; VSS, no leukocytosis; Wound culture growing pseudomonas w/ Cipro I2  -called lab - organism sens to Levaquin  -although sens to Levaquin, may not be as efficatious  -prefer Zosyn use  -c/w zosyn 4.5g q6h - 10d total course    Dispo: [facility] vs home? as pt stating decreased ADL/IADL functioning as well as difficulty ambulating      Discussed with attending.

## 2018-03-28 NOTE — DIETITIAN INITIAL EVALUATION ADULT. - ENERGY NEEDS
Height 67":; #; #; 122%IBW  BMI 25.8  Ideal body weight used for calculations as pt >120% of IBW. Needs estimated 2/2 maintenance in older adults

## 2018-03-28 NOTE — PROGRESS NOTE ADULT - SUBJECTIVE AND OBJECTIVE BOX
INTERVAL HPI/OVERNIGHT EVENTS:  ROXANN o/n.  Pt c/o L foot pain especially when walking. describes concern for managing her household affairs including cooking, cleaning, and possibly maintaining IV meds.  ROS: denies fever/chills/sweats, HA, CP, palpitations, SOB, cough, N/V/D/C, dysuria, changes in bowel movements, LE edema/erythema    VITAL SIGNS:  T(F): 97.8 (03-28-18 @ 09:15)  HR: 55 (03-28-18 @ 08:31)  BP: 140/65 (03-28-18 @ 08:31)  RR: 16 (03-28-18 @ 08:31)  SpO2: 99% (03-28-18 @ 08:31)  Wt(kg): --      PHYSICAL EXAM:  General: NAD, wdwn, AxOx3  Pulmonary: CTA b/l, no wheezes/rhonchi/rales  Cardiovascular: RRR, S1/S2 present, no M/R/G  Abdominal: BS present, soft, NTND  Extremities: warm b/l w/o erythema/edema, LLE recently redressed    ACTIVE ANTIMICROBIALS/ANTIBIOTICS:  piperacillin/tazobactam IVPB. 4.5 Gram(s) IV Intermittent every 6 hours        Allergies    No Known Allergies    Intolerances    codeine (Vomiting)      LABS:    No labs this AM      CURRENT CULTURE RESULTS:  Culture - Other (03.23.18 @ 16:09)    Gram Stain:   Few Gram Negative Rods  Rare White blood cells    -  Aztreonam: I    -  Aztreonam: R >16    -  Ceftazidime: S    -  Ceftazidime: S 4    -  Ciprofloxacin: I 2    -  Gentamicin: S    -  Gentamicin: S <=4    -  Levofloxacin: S <=2    -  Piperacillin/Tazobactam: S    -  Piperacillin/Tazobactam: S <=16    -  Tobramycin: S    -  Tobramycin: S <=4    -  Ticarcillin/Clavulanate: R    Specimen Source: .Other left ankle wound culture    Culture Results:   Numerous Pseudomonas aeruginosa  Moderate Coag Negative Staphylococcus    Organism Identification: Pseudomonas aeruginosa  Pseudomonas aeruginosa    Organism: Pseudomonas aeruginosa    Organism: Pseudomonas aeruginosa    Method Type: KB    Method Type: RUBY    Culture - Blood (03.23.18 @ 12:55)    Specimen Source: .Blood Blood    Culture Results:   No growth at 4 days.        RADIOLOGY & ADDITIONAL TESTS:  Reviewed

## 2018-03-28 NOTE — PROGRESS NOTE ADULT - SUBJECTIVE AND OBJECTIVE BOX
O/N: ROXANN        Assessment/Plan;  67 yo F with PMHx of DM, HTN, HLD who presents c/o worsening pain and ulceration of the left lateral malleolus after failed outpatient therapy. Pt is admitted to vascular surgery for IV abx treatment and wound management.  Hyponatremia    - Possible PICC line if pt agreeable  - Problem: Infected ulcer of skin.  Plan: Zosyn [id number]  - DVT ppx  - Home meds  -Diabetic diet/ISS  -Pain control  -Daily local wound care O/N: ROXANN    S. Pt c/o unable to walk 2/2 pain.     piperacillin/tazobactam IVPB. 4.5  heparin  Injectable 5000  losartan 100  piperacillin/tazobactam IVPB. 4.5      Allergies    No Known Allergies    Intolerances    codeine (Vomiting)      Vital Signs Last 24 Hrs  T(C): 37.2 (28 Mar 2018 05:31), Max: 37.2 (28 Mar 2018 05:31)  T(F): 98.9 (28 Mar 2018 05:31), Max: 98.9 (28 Mar 2018 05:31)  HR: 51 (28 Mar 2018 06:21) (48 - 65)  BP: 134/60 (28 Mar 2018 06:21) (100/57 - 154/67)  BP(mean): --  RR: 18 (28 Mar 2018 06:21) (16 - 18)  SpO2: 99% (28 Mar 2018 06:21) (98% - 100%)    Physical Exam:  General: awake, alert, NAD  Pulmonary:  Cardiovascular:  Abdominal:  Extremities: Left lateral malleolus ulcer dry scab with small open pink tissue. No edema. No erythema  Pulses:   Right:                                                                           Left:  FEM [ ]2+ [ ]1+ [ ] doppler                                            FEM [ ]2+ [ ]1+ [ ] doppler    POP [ ]2+ [ ]1+ [ ] doppler                                            POP [ ]2+ [ ]1+ [ ] doppler    DP [ ]2+ [ ]1+ [ ] doppler                                               DP [ ]2+ [ ]1+ [ ] doppler  PT[ ]2+ [ ]1+ [ ] doppler                                                 PT [ ]2+ [ ]1+ [ ] doppler      LABS:                RADIOLOGY & ADDITIONAL TESTS:      Assessment/Plan;  67 yo F with PMHx of DM, HTN, HLD who presents c/o worsening pain and ulceration of the left lateral malleolus after failed outpatient therapy. Pt is admitted to vascular surgery for IV abx treatment and wound management.  Hyponatremia    - Possible PICC line if pt agreeable  - home infusion vs [facility].   - Problem: Infected ulcer of skin.  Plan: Zosyn [id number]  - DVT ppx  - Home meds  -Diabetic diet/ISS  -Pain control  -Daily local wound care

## 2018-03-29 LAB
GLUCOSE BLDC GLUCOMTR-MCNC: 196 MG/DL — HIGH (ref 70–99)
GLUCOSE BLDC GLUCOMTR-MCNC: 242 MG/DL — HIGH (ref 70–99)
GLUCOSE BLDC GLUCOMTR-MCNC: 260 MG/DL — HIGH (ref 70–99)
GLUCOSE BLDC GLUCOMTR-MCNC: 264 MG/DL — HIGH (ref 70–99)

## 2018-03-29 RX ADMIN — Medication 100 MILLIGRAM(S): at 21:21

## 2018-03-29 RX ADMIN — OXYCODONE AND ACETAMINOPHEN 2 TABLET(S): 5; 325 TABLET ORAL at 22:45

## 2018-03-29 RX ADMIN — Medication 2: at 07:11

## 2018-03-29 RX ADMIN — Medication 4: at 16:36

## 2018-03-29 RX ADMIN — SODIUM CHLORIDE 3 MILLILITER(S): 9 INJECTION INTRAMUSCULAR; INTRAVENOUS; SUBCUTANEOUS at 21:20

## 2018-03-29 RX ADMIN — Medication 6: at 11:24

## 2018-03-29 RX ADMIN — OXYCODONE AND ACETAMINOPHEN 2 TABLET(S): 5; 325 TABLET ORAL at 05:48

## 2018-03-29 RX ADMIN — Medication 100 MILLIGRAM(S): at 05:47

## 2018-03-29 RX ADMIN — Medication 100 MILLIGRAM(S): at 13:44

## 2018-03-29 RX ADMIN — OXYCODONE AND ACETAMINOPHEN 2 TABLET(S): 5; 325 TABLET ORAL at 18:06

## 2018-03-29 RX ADMIN — Medication 6: at 21:56

## 2018-03-29 RX ADMIN — GABAPENTIN 200 MILLIGRAM(S): 400 CAPSULE ORAL at 21:20

## 2018-03-29 RX ADMIN — OXYCODONE AND ACETAMINOPHEN 2 TABLET(S): 5; 325 TABLET ORAL at 06:30

## 2018-03-29 RX ADMIN — SENNA PLUS 2 TABLET(S): 8.6 TABLET ORAL at 21:20

## 2018-03-29 RX ADMIN — HEPARIN SODIUM 5000 UNIT(S): 5000 INJECTION INTRAVENOUS; SUBCUTANEOUS at 05:47

## 2018-03-29 RX ADMIN — HEPARIN SODIUM 5000 UNIT(S): 5000 INJECTION INTRAVENOUS; SUBCUTANEOUS at 13:44

## 2018-03-29 RX ADMIN — GABAPENTIN 200 MILLIGRAM(S): 400 CAPSULE ORAL at 13:44

## 2018-03-29 RX ADMIN — SODIUM CHLORIDE 3 MILLILITER(S): 9 INJECTION INTRAMUSCULAR; INTRAVENOUS; SUBCUTANEOUS at 13:47

## 2018-03-29 RX ADMIN — PIPERACILLIN AND TAZOBACTAM 100 GRAM(S): 4; .5 INJECTION, POWDER, LYOPHILIZED, FOR SOLUTION INTRAVENOUS at 05:47

## 2018-03-29 RX ADMIN — HEPARIN SODIUM 5000 UNIT(S): 5000 INJECTION INTRAVENOUS; SUBCUTANEOUS at 21:20

## 2018-03-29 RX ADMIN — PIPERACILLIN AND TAZOBACTAM 100 GRAM(S): 4; .5 INJECTION, POWDER, LYOPHILIZED, FOR SOLUTION INTRAVENOUS at 17:39

## 2018-03-29 RX ADMIN — OXYCODONE AND ACETAMINOPHEN 2 TABLET(S): 5; 325 TABLET ORAL at 10:26

## 2018-03-29 RX ADMIN — OXYCODONE AND ACETAMINOPHEN 2 TABLET(S): 5; 325 TABLET ORAL at 23:45

## 2018-03-29 RX ADMIN — LOSARTAN POTASSIUM 100 MILLIGRAM(S): 100 TABLET, FILM COATED ORAL at 05:47

## 2018-03-29 RX ADMIN — SODIUM CHLORIDE 3 MILLILITER(S): 9 INJECTION INTRAMUSCULAR; INTRAVENOUS; SUBCUTANEOUS at 05:58

## 2018-03-29 RX ADMIN — GABAPENTIN 200 MILLIGRAM(S): 400 CAPSULE ORAL at 05:47

## 2018-03-29 RX ADMIN — SIMVASTATIN 40 MILLIGRAM(S): 20 TABLET, FILM COATED ORAL at 21:20

## 2018-03-29 RX ADMIN — OXYCODONE AND ACETAMINOPHEN 2 TABLET(S): 5; 325 TABLET ORAL at 11:00

## 2018-03-29 RX ADMIN — PIPERACILLIN AND TAZOBACTAM 100 GRAM(S): 4; .5 INJECTION, POWDER, LYOPHILIZED, FOR SOLUTION INTRAVENOUS at 11:24

## 2018-03-29 NOTE — PROGRESS NOTE ADULT - SUBJECTIVE AND OBJECTIVE BOX
O/N: ROXANN                      Assessment/Plan;  67 yo F with PMHx of DM, HTN, HLD who presents c/o worsening pain and ulceration of the left lateral malleolus after failed outpatient therapy. Pt is admitted to vascular surgery for IV abx treatment and wound management.  Hyponatremia    - Possible PICC line if pt agreeable  - home infusion vs [facility].   - Problem: Infected ulcer of skin.  Plan: Zosyn [id number] total  - DVT ppx  - Home meds  -Diabetic diet/ISS  -Pain control  -Daily local wound care O/N: ROXANN  3/28: patient does not want to go to [facility] [place], will resume Zosyn inhouse [id number]                    Assessment/Plan;  69 yo F with PMHx of DM, HTN, HLD who presents c/o worsening pain and ulceration of the left lateral malleolus after failed outpatient therapy. Pt is admitted to vascular surgery for IV abx treatment and wound management.  Hyponatremia    - Possible PICC line if pt agreeable  - home infusion vs [facility] [place].   - Problem: Infected ulcer of skin.  Plan: Zosyn [id number] total  - DVT ppx  - Home meds  -Diabetic diet/ISS  -Pain control  -Daily local wound care O/N: ROXANN  3/28: patient does not want to go to [facility], will resume Zosyn inhouse [id number]      S. c/o pain in left leg off and on.    piperacillin/tazobactam IVPB. 4.5  heparin  Injectable 5000  losartan 100  piperacillin/tazobactam IVPB. 4.5      Allergies    No Known Allergies    Intolerances    codeine (Vomiting)      Vital Signs Last 24 Hrs  T(C): 36.4 (29 Mar 2018 05:41), Max: 36.9 (28 Mar 2018 23:55)  T(F): 97.6 (29 Mar 2018 05:41), Max: 98.4 (28 Mar 2018 23:55)  HR: 55 (29 Mar 2018 08:39) (50 - 55)  BP: 122/57 (29 Mar 2018 08:39) (115/61 - 152/79)  BP(mean): --  RR: 18 (29 Mar 2018 08:39) (16 - 18)  SpO2: 100% (29 Mar 2018 08:39) (99% - 100%)    Physical Exam:  General: awake, alert, NAD  Pulmonary:  Cardiovascular:  Abdominal:  Extremities: Left leg lateral malleolus ulcer - dry eschar.  Pulses:   Right:                                                                           Left:  FEM [ ]2+ [ ]1+ [ ] doppler                                            FEM [ ]2+ [ ]1+ [ ] doppler    POP [ ]2+ [ ]1+ [ ] doppler                                            POP [ ]2+ [ ]1+ [ ] doppler    DP [ ]2+ [ ]1+ [ ] doppler                                               DP [ ]2+ [ ]1+ [ ] doppler  PT[ ]2+ [ ]1+ [ ] doppler                                                 PT [ ]2+ [ ]1+ [ ] doppler      LABS:                RADIOLOGY & ADDITIONAL TESTS:                Assessment/Plan;  69 yo F with PMHx of DM, HTN, HLD who presents c/o worsening pain and ulceration of the left lateral malleolus after failed outpatient therapy. Pt is admitted to vascular surgery for IV abx treatment and wound management.  Hyponatremia    - Refused home infusion. Unable to climb stairs. For [facility].  - Problem: Infected ulcer of skin.  Plan: Zosyn [id number] total Last day April 1.  - DVT ppx  - Home meds  -Diabetic diet/ISS  -Pain control  -Daily local wound care

## 2018-03-30 LAB
ANION GAP SERPL CALC-SCNC: 13 MMOL/L — SIGNIFICANT CHANGE UP (ref 5–17)
BUN SERPL-MCNC: 16 MG/DL — SIGNIFICANT CHANGE UP (ref 7–23)
CALCIUM SERPL-MCNC: 9.3 MG/DL — SIGNIFICANT CHANGE UP (ref 8.4–10.5)
CHLORIDE SERPL-SCNC: 101 MMOL/L — SIGNIFICANT CHANGE UP (ref 96–108)
CO2 SERPL-SCNC: 23 MMOL/L — SIGNIFICANT CHANGE UP (ref 22–31)
CREAT SERPL-MCNC: 0.9 MG/DL — SIGNIFICANT CHANGE UP (ref 0.5–1.3)
GLUCOSE BLDC GLUCOMTR-MCNC: 197 MG/DL — HIGH (ref 70–99)
GLUCOSE BLDC GLUCOMTR-MCNC: 250 MG/DL — HIGH (ref 70–99)
GLUCOSE BLDC GLUCOMTR-MCNC: 309 MG/DL — HIGH (ref 70–99)
GLUCOSE BLDC GLUCOMTR-MCNC: 374 MG/DL — HIGH (ref 70–99)
GLUCOSE SERPL-MCNC: 205 MG/DL — HIGH (ref 70–99)
HCT VFR BLD CALC: 33.9 % — LOW (ref 34.5–45)
HGB BLD-MCNC: 10.2 G/DL — LOW (ref 11.5–15.5)
MAGNESIUM SERPL-MCNC: 1.8 MG/DL — SIGNIFICANT CHANGE UP (ref 1.6–2.6)
MCHC RBC-ENTMCNC: 27.9 PG — SIGNIFICANT CHANGE UP (ref 27–34)
MCHC RBC-ENTMCNC: 30.1 G/DL — LOW (ref 32–36)
MCV RBC AUTO: 92.6 FL — SIGNIFICANT CHANGE UP (ref 80–100)
PHOSPHATE SERPL-MCNC: 3.1 MG/DL — SIGNIFICANT CHANGE UP (ref 2.5–4.5)
PLATELET # BLD AUTO: 170 K/UL — SIGNIFICANT CHANGE UP (ref 150–400)
POTASSIUM SERPL-MCNC: 4.5 MMOL/L — SIGNIFICANT CHANGE UP (ref 3.5–5.3)
POTASSIUM SERPL-SCNC: 4.5 MMOL/L — SIGNIFICANT CHANGE UP (ref 3.5–5.3)
RBC # BLD: 3.66 M/UL — LOW (ref 3.8–5.2)
RBC # FLD: 15.1 % — SIGNIFICANT CHANGE UP (ref 10.3–16.9)
SODIUM SERPL-SCNC: 137 MMOL/L — SIGNIFICANT CHANGE UP (ref 135–145)
WBC # BLD: 3.9 K/UL — SIGNIFICANT CHANGE UP (ref 3.8–10.5)
WBC # FLD AUTO: 3.9 K/UL — SIGNIFICANT CHANGE UP (ref 3.8–10.5)

## 2018-03-30 RX ORDER — MAGNESIUM SULFATE 500 MG/ML
1 VIAL (ML) INJECTION ONCE
Qty: 0 | Refills: 0 | Status: COMPLETED | OUTPATIENT
Start: 2018-03-30 | End: 2018-03-30

## 2018-03-30 RX ADMIN — SODIUM CHLORIDE 3 MILLILITER(S): 9 INJECTION INTRAMUSCULAR; INTRAVENOUS; SUBCUTANEOUS at 21:13

## 2018-03-30 RX ADMIN — HEPARIN SODIUM 5000 UNIT(S): 5000 INJECTION INTRAVENOUS; SUBCUTANEOUS at 22:32

## 2018-03-30 RX ADMIN — Medication 100 MILLIGRAM(S): at 05:40

## 2018-03-30 RX ADMIN — HEPARIN SODIUM 5000 UNIT(S): 5000 INJECTION INTRAVENOUS; SUBCUTANEOUS at 13:57

## 2018-03-30 RX ADMIN — OXYCODONE AND ACETAMINOPHEN 2 TABLET(S): 5; 325 TABLET ORAL at 14:55

## 2018-03-30 RX ADMIN — SODIUM CHLORIDE 3 MILLILITER(S): 9 INJECTION INTRAMUSCULAR; INTRAVENOUS; SUBCUTANEOUS at 13:47

## 2018-03-30 RX ADMIN — Medication 100 MILLIGRAM(S): at 13:57

## 2018-03-30 RX ADMIN — PIPERACILLIN AND TAZOBACTAM 100 GRAM(S): 4; .5 INJECTION, POWDER, LYOPHILIZED, FOR SOLUTION INTRAVENOUS at 17:07

## 2018-03-30 RX ADMIN — GABAPENTIN 200 MILLIGRAM(S): 400 CAPSULE ORAL at 22:32

## 2018-03-30 RX ADMIN — Medication 2: at 07:47

## 2018-03-30 RX ADMIN — Medication 4: at 17:07

## 2018-03-30 RX ADMIN — Medication 10: at 22:31

## 2018-03-30 RX ADMIN — PIPERACILLIN AND TAZOBACTAM 100 GRAM(S): 4; .5 INJECTION, POWDER, LYOPHILIZED, FOR SOLUTION INTRAVENOUS at 23:41

## 2018-03-30 RX ADMIN — LOSARTAN POTASSIUM 100 MILLIGRAM(S): 100 TABLET, FILM COATED ORAL at 05:39

## 2018-03-30 RX ADMIN — Medication 100 GRAM(S): at 13:57

## 2018-03-30 RX ADMIN — Medication 100 MILLIGRAM(S): at 22:30

## 2018-03-30 RX ADMIN — HYDROMORPHONE HYDROCHLORIDE 0.5 MILLIGRAM(S): 2 INJECTION INTRAMUSCULAR; INTRAVENOUS; SUBCUTANEOUS at 06:05

## 2018-03-30 RX ADMIN — ONDANSETRON 4 MILLIGRAM(S): 8 TABLET, FILM COATED ORAL at 05:39

## 2018-03-30 RX ADMIN — OXYCODONE AND ACETAMINOPHEN 2 TABLET(S): 5; 325 TABLET ORAL at 18:11

## 2018-03-30 RX ADMIN — OXYCODONE AND ACETAMINOPHEN 2 TABLET(S): 5; 325 TABLET ORAL at 10:50

## 2018-03-30 RX ADMIN — OXYCODONE AND ACETAMINOPHEN 2 TABLET(S): 5; 325 TABLET ORAL at 13:57

## 2018-03-30 RX ADMIN — HYDROMORPHONE HYDROCHLORIDE 0.5 MILLIGRAM(S): 2 INJECTION INTRAMUSCULAR; INTRAVENOUS; SUBCUTANEOUS at 05:05

## 2018-03-30 RX ADMIN — OXYCODONE AND ACETAMINOPHEN 2 TABLET(S): 5; 325 TABLET ORAL at 22:31

## 2018-03-30 RX ADMIN — OXYCODONE AND ACETAMINOPHEN 2 TABLET(S): 5; 325 TABLET ORAL at 22:42

## 2018-03-30 RX ADMIN — GABAPENTIN 200 MILLIGRAM(S): 400 CAPSULE ORAL at 05:39

## 2018-03-30 RX ADMIN — Medication 8: at 11:42

## 2018-03-30 RX ADMIN — SODIUM CHLORIDE 3 MILLILITER(S): 9 INJECTION INTRAMUSCULAR; INTRAVENOUS; SUBCUTANEOUS at 05:10

## 2018-03-30 RX ADMIN — HEPARIN SODIUM 5000 UNIT(S): 5000 INJECTION INTRAVENOUS; SUBCUTANEOUS at 05:39

## 2018-03-30 RX ADMIN — PIPERACILLIN AND TAZOBACTAM 100 GRAM(S): 4; .5 INJECTION, POWDER, LYOPHILIZED, FOR SOLUTION INTRAVENOUS at 05:40

## 2018-03-30 RX ADMIN — SENNA PLUS 2 TABLET(S): 8.6 TABLET ORAL at 22:32

## 2018-03-30 RX ADMIN — SIMVASTATIN 40 MILLIGRAM(S): 20 TABLET, FILM COATED ORAL at 22:31

## 2018-03-30 RX ADMIN — GABAPENTIN 200 MILLIGRAM(S): 400 CAPSULE ORAL at 13:57

## 2018-03-30 RX ADMIN — PIPERACILLIN AND TAZOBACTAM 100 GRAM(S): 4; .5 INJECTION, POWDER, LYOPHILIZED, FOR SOLUTION INTRAVENOUS at 00:13

## 2018-03-30 RX ADMIN — OXYCODONE AND ACETAMINOPHEN 2 TABLET(S): 5; 325 TABLET ORAL at 09:58

## 2018-03-30 RX ADMIN — PIPERACILLIN AND TAZOBACTAM 100 GRAM(S): 4; .5 INJECTION, POWDER, LYOPHILIZED, FOR SOLUTION INTRAVENOUS at 11:43

## 2018-03-30 RX ADMIN — OXYCODONE AND ACETAMINOPHEN 2 TABLET(S): 5; 325 TABLET ORAL at 22:32

## 2018-03-30 NOTE — PROGRESS NOTE ADULT - SUBJECTIVE AND OBJECTIVE BOX
o/n: ROXANN  3/29: resuming IV zosyn      67 yo F with PMHx of DM, HTN, HLD who presents c/o worsening pain and ulceration of the left lateral malleolus after failed outpatient therapy. Pt is admitted to vascular surgery for IV abx treatment and wound management.  Hyponatremia    - Refused home infusion. Unable to climb stairs. For [facility].  - Problem: Infected ulcer of skin.  Plan: Zosyn [id number] total Last day April 1.  - DVT ppx  - Home meds  -Diabetic diet/ISS  -Pain control  -Daily local wound care o/n: ROXANN  3/29: resuming IV zosyn    piperacillin/tazobactam IVPB. 4.5  heparin  Injectable 5000  losartan 100  piperacillin/tazobactam IVPB. 4.5      Allergies    No Known Allergies    Intolerances    codeine (Vomiting)      Vital Signs Last 24 Hrs  T(C): 36.4 (30 Mar 2018 09:19), Max: 37.1 (29 Mar 2018 22:13)  T(F): 97.6 (30 Mar 2018 09:19), Max: 98.8 (29 Mar 2018 22:13)  HR: 57 (30 Mar 2018 08:16) (52 - 59)  BP: 122/57 (30 Mar 2018 08:16) (109/53 - 139/86)  BP(mean): --  RR: 18 (30 Mar 2018 08:16) (16 - 18)  SpO2: 99% (30 Mar 2018 08:16) (98% - 100%)  I&O's Summary    29 Mar 2018 07:01  -  30 Mar 2018 07:00  --------------------------------------------------------  IN: 560 mL / OUT: 0 mL / NET: 560 mL    30 Mar 2018 07:01  -  30 Mar 2018 13:20  --------------------------------------------------------  IN: 180 mL / OUT: 0 mL / NET: 180 mL        Physical Exam:  General: AAOx3, NAD  Pulmonary:  Cardiovascular:  Abdominal:  Extremities: Left leg lateral malleolus ulcer - dry eschar, no drainage, no surrounding erythema/edema/warmth      LABS:                        10.2   3.9   )-----------( 170      ( 30 Mar 2018 06:01 )             33.9     03-30    137  |  101  |  16  ----------------------------<  205<H>  4.5   |  23  |  0.90    Ca    9.3      30 Mar 2018 05:59  Phos  3.1     03-30  Mg     1.8     03-30          Radiology and Additional Studies:    67 yo F with PMHx of DM, HTN, HLD who presents c/o worsening pain and ulceration of the left lateral malleolus after failed outpatient therapy. Pt is admitted to vascular surgery for IV abx treatment and wound management.  Hyponatremia    - Refused home infusion. Unable to climb stairs. For [facility].  - Problem: Infected ulcer of skin.  Plan: Zosyn [id number] total Last day April 1.  - DVT ppx  - Home meds  -Diabetic diet/ISS  -Pain control  -Daily local wound care

## 2018-03-31 LAB
GLUCOSE BLDC GLUCOMTR-MCNC: 171 MG/DL — HIGH (ref 70–99)
GLUCOSE BLDC GLUCOMTR-MCNC: 247 MG/DL — HIGH (ref 70–99)
GLUCOSE BLDC GLUCOMTR-MCNC: 299 MG/DL — HIGH (ref 70–99)
GLUCOSE BLDC GLUCOMTR-MCNC: 339 MG/DL — HIGH (ref 70–99)

## 2018-03-31 RX ADMIN — SIMVASTATIN 40 MILLIGRAM(S): 20 TABLET, FILM COATED ORAL at 22:04

## 2018-03-31 RX ADMIN — GABAPENTIN 200 MILLIGRAM(S): 400 CAPSULE ORAL at 05:57

## 2018-03-31 RX ADMIN — HEPARIN SODIUM 5000 UNIT(S): 5000 INJECTION INTRAVENOUS; SUBCUTANEOUS at 14:50

## 2018-03-31 RX ADMIN — OXYCODONE AND ACETAMINOPHEN 2 TABLET(S): 5; 325 TABLET ORAL at 17:10

## 2018-03-31 RX ADMIN — Medication 4: at 17:09

## 2018-03-31 RX ADMIN — HEPARIN SODIUM 5000 UNIT(S): 5000 INJECTION INTRAVENOUS; SUBCUTANEOUS at 22:04

## 2018-03-31 RX ADMIN — PIPERACILLIN AND TAZOBACTAM 100 GRAM(S): 4; .5 INJECTION, POWDER, LYOPHILIZED, FOR SOLUTION INTRAVENOUS at 17:10

## 2018-03-31 RX ADMIN — PIPERACILLIN AND TAZOBACTAM 100 GRAM(S): 4; .5 INJECTION, POWDER, LYOPHILIZED, FOR SOLUTION INTRAVENOUS at 05:56

## 2018-03-31 RX ADMIN — OXYCODONE AND ACETAMINOPHEN 2 TABLET(S): 5; 325 TABLET ORAL at 03:14

## 2018-03-31 RX ADMIN — GABAPENTIN 200 MILLIGRAM(S): 400 CAPSULE ORAL at 22:04

## 2018-03-31 RX ADMIN — Medication 8: at 22:03

## 2018-03-31 RX ADMIN — OXYCODONE AND ACETAMINOPHEN 2 TABLET(S): 5; 325 TABLET ORAL at 22:03

## 2018-03-31 RX ADMIN — SODIUM CHLORIDE 3 MILLILITER(S): 9 INJECTION INTRAMUSCULAR; INTRAVENOUS; SUBCUTANEOUS at 23:01

## 2018-03-31 RX ADMIN — OXYCODONE AND ACETAMINOPHEN 2 TABLET(S): 5; 325 TABLET ORAL at 12:20

## 2018-03-31 RX ADMIN — GABAPENTIN 200 MILLIGRAM(S): 400 CAPSULE ORAL at 14:49

## 2018-03-31 RX ADMIN — OXYCODONE AND ACETAMINOPHEN 2 TABLET(S): 5; 325 TABLET ORAL at 07:56

## 2018-03-31 RX ADMIN — Medication 100 MILLIGRAM(S): at 22:04

## 2018-03-31 RX ADMIN — SODIUM CHLORIDE 3 MILLILITER(S): 9 INJECTION INTRAMUSCULAR; INTRAVENOUS; SUBCUTANEOUS at 14:43

## 2018-03-31 RX ADMIN — Medication 6: at 11:24

## 2018-03-31 RX ADMIN — OXYCODONE AND ACETAMINOPHEN 2 TABLET(S): 5; 325 TABLET ORAL at 11:23

## 2018-03-31 RX ADMIN — OXYCODONE AND ACETAMINOPHEN 2 TABLET(S): 5; 325 TABLET ORAL at 23:01

## 2018-03-31 RX ADMIN — OXYCODONE AND ACETAMINOPHEN 2 TABLET(S): 5; 325 TABLET ORAL at 01:56

## 2018-03-31 RX ADMIN — SENNA PLUS 2 TABLET(S): 8.6 TABLET ORAL at 22:04

## 2018-03-31 RX ADMIN — OXYCODONE AND ACETAMINOPHEN 2 TABLET(S): 5; 325 TABLET ORAL at 18:00

## 2018-03-31 RX ADMIN — Medication 100 MILLIGRAM(S): at 05:57

## 2018-03-31 RX ADMIN — SODIUM CHLORIDE 3 MILLILITER(S): 9 INJECTION INTRAMUSCULAR; INTRAVENOUS; SUBCUTANEOUS at 05:51

## 2018-03-31 RX ADMIN — HEPARIN SODIUM 5000 UNIT(S): 5000 INJECTION INTRAVENOUS; SUBCUTANEOUS at 05:57

## 2018-03-31 RX ADMIN — PIPERACILLIN AND TAZOBACTAM 100 GRAM(S): 4; .5 INJECTION, POWDER, LYOPHILIZED, FOR SOLUTION INTRAVENOUS at 23:01

## 2018-03-31 RX ADMIN — PIPERACILLIN AND TAZOBACTAM 100 GRAM(S): 4; .5 INJECTION, POWDER, LYOPHILIZED, FOR SOLUTION INTRAVENOUS at 11:24

## 2018-03-31 RX ADMIN — LOSARTAN POTASSIUM 100 MILLIGRAM(S): 100 TABLET, FILM COATED ORAL at 05:57

## 2018-03-31 RX ADMIN — Medication 100 MILLIGRAM(S): at 14:49

## 2018-03-31 RX ADMIN — Medication 2: at 06:44

## 2018-03-31 NOTE — PROGRESS NOTE ADULT - SUBJECTIVE AND OBJECTIVE BOX
o/n: ROXANN  3/30: ROXANN    67 yo F with PMHx of DM, HTN, HLD who presents c/o worsening pain and ulceration of the left lateral malleolus after failed outpatient therapy. Pt is admitted to vascular surgery for IV abx treatment and wound management.  Hyponatremia    - Refused home infusion. Unable to climb stairs. For [facility].  - Problem: Infected ulcer of skin.  Plan: Zosyn [id number] total Last day April 1.  - DVT ppx  - Home meds  -Diabetic diet/ISS  -Pain control  -Daily local wound care o/n: ROXANN  3/30: ROXANN    No issues overnight. No complaints. Pain at ulcer site of LLE improved.     PMH:  Varicose vein of leg  Type 2 diabetes mellitus without complication, without long-term current use of insulin  Essential hypertension  Hyperlipidemia, unspecified hyperlipidemia type      Medication:   piperacillin/tazobactam IVPB. 4.5  heparin  Injectable 5000  losartan 100  piperacillin/tazobactam IVPB. 4.5        Vital Signs Last 24 Hrs  T(C): 36 (31 Mar 2018 09:10), Max: 36.7 (31 Mar 2018 06:39)  T(F): 96.8 (31 Mar 2018 09:10), Max: 98.1 (31 Mar 2018 06:39)  HR: 60 (31 Mar 2018 11:46) (52 - 80)  BP: 120/58 (31 Mar 2018 11:46) (108/56 - 145/67)  BP(mean): --  RR: 18 (31 Mar 2018 11:46) (16 - 18)  SpO2: 98% (31 Mar 2018 11:46) (98% - 99%)  I&O's Summary    30 Mar 2018 07:01  -  31 Mar 2018 07:00  --------------------------------------------------------  IN: 660 mL / OUT: 0 mL / NET: 660 mL    31 Mar 2018 07:01  -  31 Mar 2018 13:26  --------------------------------------------------------  IN: 390 mL / OUT: 0 mL / NET: 390 mL          Physical Exam:  General: AAOx3, NAD  Pulmonary:  Cardiovascular:  Abdominal:  Extremities: Left leg lateral malleolus ulcer - dry eschar, no drainage, no surrounding]doppler      LABS:                        10.2   3.9   )-----------( 170      ( 30 Mar 2018 06:01 )             33.9     03-30    137  |  101  |  16  ----------------------------<  205<H>  4.5   |  23  |  0.90    Ca    9.3      30 Mar 2018 05:59  Phos  3.1     03-30  Mg     1.8     03-30          Radiology and Additional Studies:        67 yo F with PMHx of DM, HTN, HLD who presents c/o worsening pain and ulceration of the left lateral malleolus after failed outpatient therapy. Pt is admitted to vascular surgery for IV abx treatment and wound management.  Hyponatremia    - Refused home infusion. Unable to climb stairs. For [facility].  - Problem: Infected ulcer of skin.  Plan: Zosyn [id number] total Last day April 1.  - DVT ppx  - Home meds  -Diabetic diet/ISS  -Pain control  -Daily local wound care

## 2018-04-01 LAB
GLUCOSE BLDC GLUCOMTR-MCNC: 200 MG/DL — HIGH (ref 70–99)
GLUCOSE BLDC GLUCOMTR-MCNC: 284 MG/DL — HIGH (ref 70–99)
GLUCOSE BLDC GLUCOMTR-MCNC: 285 MG/DL — HIGH (ref 70–99)
GLUCOSE BLDC GLUCOMTR-MCNC: 374 MG/DL — HIGH (ref 70–99)

## 2018-04-01 RX ORDER — OXYCODONE AND ACETAMINOPHEN 5; 325 MG/1; MG/1
1 TABLET ORAL EVERY 4 HOURS
Qty: 0 | Refills: 0 | Status: DISCONTINUED | OUTPATIENT
Start: 2018-04-01 | End: 2018-04-01

## 2018-04-01 RX ORDER — OXYCODONE AND ACETAMINOPHEN 5; 325 MG/1; MG/1
2 TABLET ORAL EVERY 6 HOURS
Qty: 0 | Refills: 0 | Status: DISCONTINUED | OUTPATIENT
Start: 2018-04-01 | End: 2018-04-02

## 2018-04-01 RX ORDER — HYDROMORPHONE HYDROCHLORIDE 2 MG/ML
0.5 INJECTION INTRAMUSCULAR; INTRAVENOUS; SUBCUTANEOUS ONCE
Qty: 0 | Refills: 0 | Status: DISCONTINUED | OUTPATIENT
Start: 2018-04-01 | End: 2018-04-02

## 2018-04-01 RX ORDER — GABAPENTIN 400 MG/1
300 CAPSULE ORAL THREE TIMES A DAY
Qty: 0 | Refills: 0 | Status: DISCONTINUED | OUTPATIENT
Start: 2018-04-01 | End: 2018-04-05

## 2018-04-01 RX ADMIN — LOSARTAN POTASSIUM 100 MILLIGRAM(S): 100 TABLET, FILM COATED ORAL at 05:48

## 2018-04-01 RX ADMIN — Medication 6: at 22:09

## 2018-04-01 RX ADMIN — SODIUM CHLORIDE 3 MILLILITER(S): 9 INJECTION INTRAMUSCULAR; INTRAVENOUS; SUBCUTANEOUS at 22:31

## 2018-04-01 RX ADMIN — SODIUM CHLORIDE 3 MILLILITER(S): 9 INJECTION INTRAMUSCULAR; INTRAVENOUS; SUBCUTANEOUS at 14:09

## 2018-04-01 RX ADMIN — PIPERACILLIN AND TAZOBACTAM 100 GRAM(S): 4; .5 INJECTION, POWDER, LYOPHILIZED, FOR SOLUTION INTRAVENOUS at 22:31

## 2018-04-01 RX ADMIN — HEPARIN SODIUM 5000 UNIT(S): 5000 INJECTION INTRAVENOUS; SUBCUTANEOUS at 21:56

## 2018-04-01 RX ADMIN — Medication 6: at 11:04

## 2018-04-01 RX ADMIN — OXYCODONE AND ACETAMINOPHEN 2 TABLET(S): 5; 325 TABLET ORAL at 12:04

## 2018-04-01 RX ADMIN — OXYCODONE AND ACETAMINOPHEN 2 TABLET(S): 5; 325 TABLET ORAL at 04:38

## 2018-04-01 RX ADMIN — SODIUM CHLORIDE 3 MILLILITER(S): 9 INJECTION INTRAMUSCULAR; INTRAVENOUS; SUBCUTANEOUS at 05:48

## 2018-04-01 RX ADMIN — PIPERACILLIN AND TAZOBACTAM 100 GRAM(S): 4; .5 INJECTION, POWDER, LYOPHILIZED, FOR SOLUTION INTRAVENOUS at 17:40

## 2018-04-01 RX ADMIN — PIPERACILLIN AND TAZOBACTAM 100 GRAM(S): 4; .5 INJECTION, POWDER, LYOPHILIZED, FOR SOLUTION INTRAVENOUS at 11:04

## 2018-04-01 RX ADMIN — HEPARIN SODIUM 5000 UNIT(S): 5000 INJECTION INTRAVENOUS; SUBCUTANEOUS at 14:21

## 2018-04-01 RX ADMIN — Medication 2: at 07:33

## 2018-04-01 RX ADMIN — SENNA PLUS 2 TABLET(S): 8.6 TABLET ORAL at 21:56

## 2018-04-01 RX ADMIN — OXYCODONE AND ACETAMINOPHEN 2 TABLET(S): 5; 325 TABLET ORAL at 11:04

## 2018-04-01 RX ADMIN — Medication 100 MILLIGRAM(S): at 21:56

## 2018-04-01 RX ADMIN — Medication 100 MILLIGRAM(S): at 05:47

## 2018-04-01 RX ADMIN — HEPARIN SODIUM 5000 UNIT(S): 5000 INJECTION INTRAVENOUS; SUBCUTANEOUS at 05:47

## 2018-04-01 RX ADMIN — OXYCODONE AND ACETAMINOPHEN 2 TABLET(S): 5; 325 TABLET ORAL at 23:44

## 2018-04-01 RX ADMIN — GABAPENTIN 300 MILLIGRAM(S): 400 CAPSULE ORAL at 21:56

## 2018-04-01 RX ADMIN — GABAPENTIN 200 MILLIGRAM(S): 400 CAPSULE ORAL at 14:21

## 2018-04-01 RX ADMIN — Medication 100 MILLIGRAM(S): at 14:21

## 2018-04-01 RX ADMIN — PIPERACILLIN AND TAZOBACTAM 100 GRAM(S): 4; .5 INJECTION, POWDER, LYOPHILIZED, FOR SOLUTION INTRAVENOUS at 05:47

## 2018-04-01 RX ADMIN — GABAPENTIN 200 MILLIGRAM(S): 400 CAPSULE ORAL at 05:48

## 2018-04-01 RX ADMIN — SIMVASTATIN 40 MILLIGRAM(S): 20 TABLET, FILM COATED ORAL at 21:56

## 2018-04-01 RX ADMIN — Medication 10: at 16:46

## 2018-04-01 RX ADMIN — OXYCODONE AND ACETAMINOPHEN 2 TABLET(S): 5; 325 TABLET ORAL at 05:30

## 2018-04-01 RX ADMIN — OXYCODONE AND ACETAMINOPHEN 2 TABLET(S): 5; 325 TABLET ORAL at 17:59

## 2018-04-01 NOTE — PROGRESS NOTE ADULT - SUBJECTIVE AND OBJECTIVE BOX
o/n: ROXANN POPE  3/31: NIKOJOANNEE        67 yo F with PMHx of DM, HTN, HLD who presents c/o worsening pain and ulceration of the left lateral malleolus after failed outpatient therapy. Pt is admitted to vascular surgery for IV abx treatment and wound management.      - Refused home infusion. Unable to climb stairs. For [facility].  - Problem: Infected ulcer of skin.  Plan: Zosyn [id number] total Last day April 1.  - DVT ppx  - Home meds  -Diabetic diet/ISS  -Pain control  -Daily local wound care o/n: VSS, ROXANN  3/31: VSS, ROXANN    piperacillin/tazobactam IVPB. 4.5  heparin  Injectable 5000  losartan 100  piperacillin/tazobactam IVPB. 4.5      Allergies    No Known Allergies    Intolerances    codeine (Vomiting)      Vital Signs Last 24 Hrs  T(C): 36.4 (01 Apr 2018 08:50), Max: 37 (01 Apr 2018 05:45)  T(F): 97.6 (01 Apr 2018 08:50), Max: 98.6 (01 Apr 2018 05:45)  HR: 72 (01 Apr 2018 09:00) (50 - 80)  BP: 129/63 (01 Apr 2018 09:00) (108/51 - 140/62)  BP(mean): --  RR: 18 (01 Apr 2018 09:00) (16 - 18)  SpO2: 98% (01 Apr 2018 09:00) (97% - 99%)  I&O's Summary    31 Mar 2018 07:01  -  01 Apr 2018 07:00  --------------------------------------------------------  IN: 1050 mL / OUT: 0 mL / NET: 1050 mL    01 Apr 2018 07:01  -  01 Apr 2018 11:09  --------------------------------------------------------  IN: 360 mL / OUT: 0 mL / NET: 360 mL        Physical Exam:  General: AAox3, NAD  Pulmonary:  Cardiovascular:  Abdominal:  Extremities: left lateral malleolus ulcer with some fibrinous tissue, no drainage, no surrounding erythema/edema  Pulses:   Right:                                                                          Left:  FEM [ ]2+ [ ]1+ [ ]doppler                                             FEM [ ]2+ [ ]1+ [ ]doppler    POP [ ]2+ [ ]1+ [ ]doppler                                             POP [ ]2+ [ ]1+ [ ]doppler    DP [ ]2+ [ ]1+ [ ]doppler                                                DP [ ]2+ [ ]1+ [ ]doppler  PT[ ]2+ [ ]1+ [ ]doppler                                                  PT [ ]2+ [ ]1+ [ ]doppler        67 yo F with PMHx of DM, HTN, HLD who presents c/o worsening pain and ulceration of the left lateral malleolus after failed outpatient therapy. Pt is admitted to vascular surgery for IV abx treatment and wound management.      - Problem: Infected ulcer of skin.  Plan: Zosyn [id number] total Last day April 1.  - DVT ppx  - Home meds  -Diabetic diet/ISS  -Pain control  -Daily local wound care  -[facility] placement on Monday

## 2018-04-02 LAB
GLUCOSE BLDC GLUCOMTR-MCNC: 207 MG/DL — HIGH (ref 70–99)
GLUCOSE BLDC GLUCOMTR-MCNC: 242 MG/DL — HIGH (ref 70–99)
GLUCOSE BLDC GLUCOMTR-MCNC: 292 MG/DL — HIGH (ref 70–99)
GLUCOSE BLDC GLUCOMTR-MCNC: 311 MG/DL — HIGH (ref 70–99)

## 2018-04-02 RX ORDER — GABAPENTIN 400 MG/1
1 CAPSULE ORAL
Qty: 0 | Refills: 0 | COMMUNITY
Start: 2018-04-02

## 2018-04-02 RX ORDER — DOCUSATE SODIUM 100 MG
1 CAPSULE ORAL
Qty: 5 | Refills: 0 | OUTPATIENT
Start: 2018-04-02 | End: 2018-04-06

## 2018-04-02 RX ORDER — INSULIN GLARGINE 100 [IU]/ML
8 INJECTION, SOLUTION SUBCUTANEOUS AT BEDTIME
Qty: 0 | Refills: 0 | Status: DISCONTINUED | OUTPATIENT
Start: 2018-04-02 | End: 2018-04-03

## 2018-04-02 RX ORDER — LOSARTAN POTASSIUM 100 MG/1
1 TABLET, FILM COATED ORAL
Qty: 0 | Refills: 0 | COMMUNITY
Start: 2018-04-02

## 2018-04-02 RX ORDER — OXYCODONE HYDROCHLORIDE 5 MG/1
5 TABLET ORAL ONCE
Qty: 0 | Refills: 0 | Status: DISCONTINUED | OUTPATIENT
Start: 2018-04-02 | End: 2018-04-02

## 2018-04-02 RX ORDER — ACETAMINOPHEN 500 MG
975 TABLET ORAL EVERY 8 HOURS
Qty: 0 | Refills: 0 | Status: DISCONTINUED | OUTPATIENT
Start: 2018-04-02 | End: 2018-04-05

## 2018-04-02 RX ADMIN — HEPARIN SODIUM 5000 UNIT(S): 5000 INJECTION INTRAVENOUS; SUBCUTANEOUS at 05:58

## 2018-04-02 RX ADMIN — SODIUM CHLORIDE 3 MILLILITER(S): 9 INJECTION INTRAMUSCULAR; INTRAVENOUS; SUBCUTANEOUS at 06:20

## 2018-04-02 RX ADMIN — PIPERACILLIN AND TAZOBACTAM 100 GRAM(S): 4; .5 INJECTION, POWDER, LYOPHILIZED, FOR SOLUTION INTRAVENOUS at 17:34

## 2018-04-02 RX ADMIN — Medication 100 MILLIGRAM(S): at 05:58

## 2018-04-02 RX ADMIN — Medication 975 MILLIGRAM(S): at 18:30

## 2018-04-02 RX ADMIN — PIPERACILLIN AND TAZOBACTAM 100 GRAM(S): 4; .5 INJECTION, POWDER, LYOPHILIZED, FOR SOLUTION INTRAVENOUS at 22:36

## 2018-04-02 RX ADMIN — PIPERACILLIN AND TAZOBACTAM 100 GRAM(S): 4; .5 INJECTION, POWDER, LYOPHILIZED, FOR SOLUTION INTRAVENOUS at 12:14

## 2018-04-02 RX ADMIN — OXYCODONE AND ACETAMINOPHEN 2 TABLET(S): 5; 325 TABLET ORAL at 05:59

## 2018-04-02 RX ADMIN — SENNA PLUS 2 TABLET(S): 8.6 TABLET ORAL at 21:50

## 2018-04-02 RX ADMIN — Medication 975 MILLIGRAM(S): at 23:30

## 2018-04-02 RX ADMIN — HEPARIN SODIUM 5000 UNIT(S): 5000 INJECTION INTRAVENOUS; SUBCUTANEOUS at 13:39

## 2018-04-02 RX ADMIN — OXYCODONE AND ACETAMINOPHEN 2 TABLET(S): 5; 325 TABLET ORAL at 00:40

## 2018-04-02 RX ADMIN — Medication 100 MILLIGRAM(S): at 21:50

## 2018-04-02 RX ADMIN — Medication 4: at 07:36

## 2018-04-02 RX ADMIN — GABAPENTIN 300 MILLIGRAM(S): 400 CAPSULE ORAL at 05:58

## 2018-04-02 RX ADMIN — Medication 6: at 12:05

## 2018-04-02 RX ADMIN — OXYCODONE HYDROCHLORIDE 5 MILLIGRAM(S): 5 TABLET ORAL at 18:30

## 2018-04-02 RX ADMIN — OXYCODONE AND ACETAMINOPHEN 2 TABLET(S): 5; 325 TABLET ORAL at 12:18

## 2018-04-02 RX ADMIN — Medication 4: at 21:50

## 2018-04-02 RX ADMIN — GABAPENTIN 300 MILLIGRAM(S): 400 CAPSULE ORAL at 21:51

## 2018-04-02 RX ADMIN — OXYCODONE AND ACETAMINOPHEN 2 TABLET(S): 5; 325 TABLET ORAL at 06:30

## 2018-04-02 RX ADMIN — Medication 8: at 17:28

## 2018-04-02 RX ADMIN — OXYCODONE AND ACETAMINOPHEN 2 TABLET(S): 5; 325 TABLET ORAL at 13:15

## 2018-04-02 RX ADMIN — Medication 975 MILLIGRAM(S): at 17:28

## 2018-04-02 RX ADMIN — LOSARTAN POTASSIUM 100 MILLIGRAM(S): 100 TABLET, FILM COATED ORAL at 05:58

## 2018-04-02 RX ADMIN — SODIUM CHLORIDE 3 MILLILITER(S): 9 INJECTION INTRAMUSCULAR; INTRAVENOUS; SUBCUTANEOUS at 13:43

## 2018-04-02 RX ADMIN — Medication 100 MILLIGRAM(S): at 13:39

## 2018-04-02 RX ADMIN — PIPERACILLIN AND TAZOBACTAM 100 GRAM(S): 4; .5 INJECTION, POWDER, LYOPHILIZED, FOR SOLUTION INTRAVENOUS at 05:59

## 2018-04-02 RX ADMIN — HEPARIN SODIUM 5000 UNIT(S): 5000 INJECTION INTRAVENOUS; SUBCUTANEOUS at 21:52

## 2018-04-02 RX ADMIN — INSULIN GLARGINE 8 UNIT(S): 100 INJECTION, SOLUTION SUBCUTANEOUS at 21:52

## 2018-04-02 RX ADMIN — GABAPENTIN 300 MILLIGRAM(S): 400 CAPSULE ORAL at 13:39

## 2018-04-02 RX ADMIN — SIMVASTATIN 40 MILLIGRAM(S): 20 TABLET, FILM COATED ORAL at 21:50

## 2018-04-02 RX ADMIN — SODIUM CHLORIDE 3 MILLILITER(S): 9 INJECTION INTRAMUSCULAR; INTRAVENOUS; SUBCUTANEOUS at 22:47

## 2018-04-02 RX ADMIN — OXYCODONE HYDROCHLORIDE 5 MILLIGRAM(S): 5 TABLET ORAL at 17:28

## 2018-04-02 NOTE — PROGRESS NOTE ADULT - SUBJECTIVE AND OBJECTIVE BOX
24hr Events:  O/N:Pain control, VSS  4/1: still c/o alot of burning and shooting pain around the ulcer, neurontin increased 200->300mg TID    Assessment/Plan:    69 yo F with PMHx of DM, HTN, HLD who presents c/o worsening pain and ulceration of the left lateral malleolus after failed outpatient therapy. Pt is admitted to vascular surgery for IV abx treatment and wound management.      - Problem: Infected ulcer of skin.  Plan: Zosyn [id number] total Last day April 1.  - DVT ppx  - Home meds  -Diabetic diet/ISS  -Pain control  -Daily local wound care  -[facility] placement on Monday 24hr Events:  O/N:Pain control, VSS  4/1: still c/o alot of burning and shooting pain around the ulcer, neurontin increased 200->300mg TID    piperacillin/tazobactam IVPB. 4.5  heparin  Injectable 5000  losartan 100  piperacillin/tazobactam IVPB. 4.5        Vital Signs Last 24 Hrs  T(C): 36.6 (02 Apr 2018 05:19), Max: 36.9 (01 Apr 2018 19:34)  T(F): 97.8 (02 Apr 2018 05:19), Max: 98.5 (01 Apr 2018 19:34)  HR: 90 (02 Apr 2018 05:57) (55 - 90)  BP: 134/63 (02 Apr 2018 05:57) (114/54 - 134/63)  BP(mean): --  RR: 18 (02 Apr 2018 05:57) (18 - 18)  SpO2: 96% (01 Apr 2018 20:19) (96% - 98%)  I&O's Summary    01 Apr 2018 07:01  -  02 Apr 2018 07:00  --------------------------------------------------------  IN: 680 mL / OUT: 0 mL / NET: 680 mL        Physical Exam:  General: NAD, resting comfortably in bed  Pulmonary: Nonlabored breathing, no respiratory distress  Cardiovascular:  Abdominal:  Extremities: left lateral malleolus ulcer with some fibrinous tissue, no drainage, no surrounding erythema/edema        LABS:              Radiology and Additional Studies:    Assessment and Plan:   Assessment/Plan:    67 yo F with PMHx of DM, HTN, HLD who presents c/o worsening pain and ulceration of the left lateral malleolus after failed outpatient therapy. Pt is admitted to vascular surgery for IV abx treatment and wound management.      - Problem: Infected ulcer of skin.  Plan: Zosyn [id number] total Last day April 1.  - DVT ppx  - Home meds  -Diabetic diet/ISS  -Pain control  -Daily local wound care  -[facility] placement on Monday  -f/u pain management

## 2018-04-02 NOTE — CONSULT NOTE ADULT - SUBJECTIVE AND OBJECTIVE BOX
Pain Management Consult Note - [facility] [phone]    Chief Complaint: Left ankle/ LE pain    HPI: Called to evaluate 68 y.o. female c/o left ankle pain due to ulcer. Patient states her current percocet dose helps to control her pain but wears off within 3-4 hours.      Pain is __x_ sharp ____dull __x_burning ___achy ___ Intensity: ____ mild _x__mod ___severe     Location ____surgical site ____cervical _____lumbar ____abd ____upper ext__x__lower ext    Worse with _x___activity _x___movement _____physical therapy___ Rest    Improved with __x__medication ___x_rest ____physical therapy    ROS: Const:  ___febrile   Eyes:___ENT:___CV: _-__chest pain  Resp: __-__sob  GI:___nausea ___vomiting ___abd pain ___npo ___clears __full diet __bm  :___ Musk: _x__pain ___spasm  Skin:___ Neuro:  ___sedation___confusion___ numbness ___weakness ___paresth  Psych:__anxiety  Endo:___ Heme:___Allergy:_________, __x_all others reviewed and negative    PAST MEDICAL & SURGICAL HISTORY:  Varicose vein of leg  Type 2 diabetes mellitus without complication, without long-term current use of insulin  Essential hypertension  Hyperlipidemia, unspecified hyperlipidemia type  No significant past surgical history      SH: _-__Tobacco   _-__Alcohol                          FH:FAMILY HISTORY:  No pertinent family history in first degree relatives      collagenase Ointment 1 Application(s) Topical daily  dextrose 5%. 1000 milliLiter(s) IV Continuous <Continuous>  dextrose 50% Injectable 12.5 Gram(s) IV Push once  dextrose 50% Injectable 25 Gram(s) IV Push once  dextrose 50% Injectable 25 Gram(s) IV Push once  dextrose Gel 1 Dose(s) Oral once PRN  docusate sodium 100 milliGRAM(s) Oral three times a day  gabapentin 300 milliGRAM(s) Oral three times a day  glucagon  Injectable 1 milliGRAM(s) IntraMuscular once PRN  heparin  Injectable 5000 Unit(s) SubCutaneous every 8 hours  insulin lispro (HumaLOG) corrective regimen sliding scale   SubCutaneous Before meals and at bedtime  losartan 100 milliGRAM(s) Oral daily  oxyCODONE    5 mG/acetaminophen 325 mG 2 Tablet(s) Oral every 6 hours PRN  piperacillin/tazobactam IVPB. 4.5 Gram(s) IV Intermittent every 6 hours  senna 2 Tablet(s) Oral at bedtime  simvastatin 40 milliGRAM(s) Oral at bedtime  sodium chloride 0.9% lock flush 3 milliLiter(s) IV Push every 8 hours      T(C): 36.6 (04-02-18 @ 09:12), Max: 36.9 (04-01-18 @ 19:34)  HR: 51 (04-02-18 @ 13:35) (51 - 90)  BP: 153/63 (04-02-18 @ 13:35) (114/54 - 153/63)  RR: 18 (04-02-18 @ 08:47) (18 - 18)  SpO2: 98% (04-02-18 @ 13:35) (96% - 98%)  Wt(kg): --    T(C): 36.6 (04-02-18 @ 09:12), Max: 36.9 (04-01-18 @ 19:34)  HR: 51 (04-02-18 @ 13:35) (51 - 90)  BP: 153/63 (04-02-18 @ 13:35) (114/54 - 153/63)  RR: 18 (04-02-18 @ 08:47) (18 - 18)  SpO2: 98% (04-02-18 @ 13:35) (96% - 98%)  Wt(kg): --    T(C): 36.6 (04-02-18 @ 09:12), Max: 36.9 (04-01-18 @ 19:34)  HR: 51 (04-02-18 @ 13:35) (51 - 90)  BP: 153/63 (04-02-18 @ 13:35) (114/54 - 153/63)  RR: 18 (04-02-18 @ 08:47) (18 - 18)  SpO2: 98% (04-02-18 @ 13:35) (96% - 98%)  Wt(kg): --    PHYSICAL EXAM:  Gen Appearance: __x_no acute distress __x_appropriate        Neuro: __x_SILT feet____ EOM Intact Psych: AAOX_3_, __x_mood/affect appropriate        Eyes: _x__conjunctiva WNL  _____ Pupils equal and round        ENT: _x__ears and nose atraumatic_x__ Hearing grossly intact        Neck: _x__trachea midline, no visible masses ___thyroid without palpable mass    Resp: _x__Nml WOB____No tactile fremitus ___clear to auscultation    Cardio: _x__extremities free from edema __x__pedal pulses palpable    GI/Abdomen: _x__soft ___x__ Nontender____x__Nondistended_____HSM    Lymphatic: _x__no palpable nodes in neck  ___no palpable nodes calves and feet    Skin/Wound: ___Incision, _x__Dressing c/d/i,   __x__surrounding tissues soft,  ___drain/chest tube present____    Muscular: EHL _5__/5  Gastrocnemius_5__/5    __x_absent clubbing/cyanosis      ASSESSMENT: This is a 68y old Female with a history of   INFECTED ULCER  No h/o HF  No pertinent family history in first degree relatives  Handoff  MEWS Score  Varicose vein of leg  Type 2 diabetes mellitus without complication, without long-term current use of insulin  Essential hypertension  Hyperlipidemia, unspecified hyperlipidemia type  Infected ulcer of skin  Infected ulcer of skin  Hyperkalemia  Type 2 diabetes mellitus without complication, without long-term current use of insulin  Hyperlipidemia, unspecified hyperlipidemia type  Essential hypertension  Infected ulcer of skin  No significant past surgical history  FOOT PAIN  6      Recommended Treatment PLAN:    1. Recommend discontinuing percocet and starting Oxycodone 5 - 10mg PO q4 hours for moderate to severe pain.  2. Recommend starting Tylenol 975mg PO q8 hours standing ATC.  Plan discussed with Dr. Harvey.

## 2018-04-03 LAB
ANION GAP SERPL CALC-SCNC: 10 MMOL/L — SIGNIFICANT CHANGE UP (ref 5–17)
BUN SERPL-MCNC: 11 MG/DL — SIGNIFICANT CHANGE UP (ref 7–23)
CALCIUM SERPL-MCNC: 9.3 MG/DL — SIGNIFICANT CHANGE UP (ref 8.4–10.5)
CHLORIDE SERPL-SCNC: 104 MMOL/L — SIGNIFICANT CHANGE UP (ref 96–108)
CO2 SERPL-SCNC: 25 MMOL/L — SIGNIFICANT CHANGE UP (ref 22–31)
CREAT SERPL-MCNC: 0.84 MG/DL — SIGNIFICANT CHANGE UP (ref 0.5–1.3)
GLUCOSE BLDC GLUCOMTR-MCNC: 177 MG/DL — HIGH (ref 70–99)
GLUCOSE BLDC GLUCOMTR-MCNC: 276 MG/DL — HIGH (ref 70–99)
GLUCOSE BLDC GLUCOMTR-MCNC: 279 MG/DL — HIGH (ref 70–99)
GLUCOSE BLDC GLUCOMTR-MCNC: 294 MG/DL — HIGH (ref 70–99)
GLUCOSE SERPL-MCNC: 195 MG/DL — HIGH (ref 70–99)
HCT VFR BLD CALC: 31.5 % — LOW (ref 34.5–45)
HGB BLD-MCNC: 9.4 G/DL — LOW (ref 11.5–15.5)
MAGNESIUM SERPL-MCNC: 1.8 MG/DL — SIGNIFICANT CHANGE UP (ref 1.6–2.6)
MCHC RBC-ENTMCNC: 27.3 PG — SIGNIFICANT CHANGE UP (ref 27–34)
MCHC RBC-ENTMCNC: 29.8 G/DL — LOW (ref 32–36)
MCV RBC AUTO: 91.6 FL — SIGNIFICANT CHANGE UP (ref 80–100)
PHOSPHATE SERPL-MCNC: 3.3 MG/DL — SIGNIFICANT CHANGE UP (ref 2.5–4.5)
PLATELET # BLD AUTO: 176 K/UL — SIGNIFICANT CHANGE UP (ref 150–400)
POTASSIUM SERPL-MCNC: 4.2 MMOL/L — SIGNIFICANT CHANGE UP (ref 3.5–5.3)
POTASSIUM SERPL-SCNC: 4.2 MMOL/L — SIGNIFICANT CHANGE UP (ref 3.5–5.3)
RBC # BLD: 3.44 M/UL — LOW (ref 3.8–5.2)
RBC # FLD: 14.8 % — SIGNIFICANT CHANGE UP (ref 10.3–16.9)
SODIUM SERPL-SCNC: 139 MMOL/L — SIGNIFICANT CHANGE UP (ref 135–145)
WBC # BLD: 2.5 K/UL — LOW (ref 3.8–10.5)
WBC # FLD AUTO: 2.5 K/UL — LOW (ref 3.8–10.5)

## 2018-04-03 RX ORDER — OXYCODONE HYDROCHLORIDE 5 MG/1
5 TABLET ORAL ONCE
Qty: 0 | Refills: 0 | Status: DISCONTINUED | OUTPATIENT
Start: 2018-04-03 | End: 2018-04-03

## 2018-04-03 RX ORDER — INSULIN GLARGINE 100 [IU]/ML
15 INJECTION, SOLUTION SUBCUTANEOUS AT BEDTIME
Qty: 0 | Refills: 0 | Status: DISCONTINUED | OUTPATIENT
Start: 2018-04-03 | End: 2018-04-05

## 2018-04-03 RX ADMIN — Medication 100 MILLIGRAM(S): at 13:52

## 2018-04-03 RX ADMIN — Medication 975 MILLIGRAM(S): at 07:09

## 2018-04-03 RX ADMIN — Medication 975 MILLIGRAM(S): at 23:20

## 2018-04-03 RX ADMIN — Medication 100 MILLIGRAM(S): at 22:08

## 2018-04-03 RX ADMIN — Medication 6: at 12:33

## 2018-04-03 RX ADMIN — Medication 100 MILLIGRAM(S): at 05:40

## 2018-04-03 RX ADMIN — SODIUM CHLORIDE 3 MILLILITER(S): 9 INJECTION INTRAMUSCULAR; INTRAVENOUS; SUBCUTANEOUS at 05:43

## 2018-04-03 RX ADMIN — SIMVASTATIN 40 MILLIGRAM(S): 20 TABLET, FILM COATED ORAL at 22:08

## 2018-04-03 RX ADMIN — SODIUM CHLORIDE 3 MILLILITER(S): 9 INJECTION INTRAMUSCULAR; INTRAVENOUS; SUBCUTANEOUS at 14:49

## 2018-04-03 RX ADMIN — LOSARTAN POTASSIUM 100 MILLIGRAM(S): 100 TABLET, FILM COATED ORAL at 05:40

## 2018-04-03 RX ADMIN — SENNA PLUS 2 TABLET(S): 8.6 TABLET ORAL at 22:07

## 2018-04-03 RX ADMIN — Medication 6: at 17:01

## 2018-04-03 RX ADMIN — OXYCODONE HYDROCHLORIDE 5 MILLIGRAM(S): 5 TABLET ORAL at 23:20

## 2018-04-03 RX ADMIN — SODIUM CHLORIDE 3 MILLILITER(S): 9 INJECTION INTRAMUSCULAR; INTRAVENOUS; SUBCUTANEOUS at 22:09

## 2018-04-03 RX ADMIN — Medication 975 MILLIGRAM(S): at 22:08

## 2018-04-03 RX ADMIN — HEPARIN SODIUM 5000 UNIT(S): 5000 INJECTION INTRAVENOUS; SUBCUTANEOUS at 05:40

## 2018-04-03 RX ADMIN — Medication 975 MILLIGRAM(S): at 15:00

## 2018-04-03 RX ADMIN — PIPERACILLIN AND TAZOBACTAM 100 GRAM(S): 4; .5 INJECTION, POWDER, LYOPHILIZED, FOR SOLUTION INTRAVENOUS at 05:40

## 2018-04-03 RX ADMIN — INSULIN GLARGINE 15 UNIT(S): 100 INJECTION, SOLUTION SUBCUTANEOUS at 22:08

## 2018-04-03 RX ADMIN — GABAPENTIN 300 MILLIGRAM(S): 400 CAPSULE ORAL at 05:40

## 2018-04-03 RX ADMIN — OXYCODONE HYDROCHLORIDE 5 MILLIGRAM(S): 5 TABLET ORAL at 22:08

## 2018-04-03 RX ADMIN — HEPARIN SODIUM 5000 UNIT(S): 5000 INJECTION INTRAVENOUS; SUBCUTANEOUS at 13:52

## 2018-04-03 RX ADMIN — Medication 6: at 22:08

## 2018-04-03 RX ADMIN — Medication 975 MILLIGRAM(S): at 00:35

## 2018-04-03 RX ADMIN — OXYCODONE HYDROCHLORIDE 5 MILLIGRAM(S): 5 TABLET ORAL at 19:25

## 2018-04-03 RX ADMIN — GABAPENTIN 300 MILLIGRAM(S): 400 CAPSULE ORAL at 13:52

## 2018-04-03 RX ADMIN — OXYCODONE HYDROCHLORIDE 5 MILLIGRAM(S): 5 TABLET ORAL at 18:28

## 2018-04-03 RX ADMIN — Medication 975 MILLIGRAM(S): at 06:30

## 2018-04-03 RX ADMIN — Medication 975 MILLIGRAM(S): at 13:52

## 2018-04-03 RX ADMIN — GABAPENTIN 300 MILLIGRAM(S): 400 CAPSULE ORAL at 22:08

## 2018-04-03 RX ADMIN — Medication 2: at 07:00

## 2018-04-03 RX ADMIN — HEPARIN SODIUM 5000 UNIT(S): 5000 INJECTION INTRAVENOUS; SUBCUTANEOUS at 22:08

## 2018-04-03 NOTE — PROGRESS NOTE ADULT - SUBJECTIVE AND OBJECTIVE BOX
24hr Events:  O/N: Given lantus 8u, VSS  4/2: P/T recommending [facility], [facility] submitted and accepted, awaiting insurance approval. D/c upon approval.      Assessment/Plan:  67 yo F with PMHx of DM, HTN, HLD who presents c/o worsening pain and ulceration of the left lateral malleolus after failed outpatient therapy. Pt is admitted to vascular surgery for IV abx treatment and wound management.      - Problem: Infected ulcer of skin.  Plan: Zosyn [id number]   - DVT ppx  - Home meds  -Diabetic diet/ISS  -Pain control  -Daily local wound care  -Discharge Plan: [facility] placement   -f/u pain management 24hr Events:  O/N: Given lantus 8u, VSS  4/2: P/T recommending [facility], [facility] submitted and accepted, awaiting insurance approval. D/c upon approval.       SUBJECTIVE: Patient seen and examined bedside by chief resident. Discharge to [facility] per P/T recs. Awaiting [facility] approval for discharge    heparin  Injectable 5000 Unit(s) SubCutaneous every 8 hours  losartan 100 milliGRAM(s) Oral daily      Vital Signs Last 24 Hrs  T(C): 35.7 (03 Apr 2018 06:07), Max: 36.9 (02 Apr 2018 17:34)  T(F): 96.3 (03 Apr 2018 06:07), Max: 98.5 (02 Apr 2018 17:34)  HR: 53 (03 Apr 2018 05:34) (48 - 62)  BP: 150/66 (03 Apr 2018 05:34) (124/58 - 153/63)  BP(mean): --  RR: 15 (03 Apr 2018 05:34) (15 - 18)  SpO2: 97% (03 Apr 2018 05:34) (97% - 98%)  I&O's Detail    02 Apr 2018 07:01  -  03 Apr 2018 07:00  --------------------------------------------------------  IN:    IV PiggyBack: 200 mL    Oral Fluid: 420 mL  Total IN: 620 mL    OUT:  Total OUT: 0 mL    Total NET: 620 mL          General: NAD, resting comfortably in bed  C/V: NSR  Pulm: Nonlabored breathing, no respiratory distress  Abd: soft, NT/ND.  Extrem:  left lateral malleolus ulcer -- no drainage or surrounding erythema/edema        LABS:                        9.4    2.5   )-----------( 176      ( 03 Apr 2018 05:42 )             31.5     04-03    139  |  104  |  11  ----------------------------<  195<H>  4.2   |  25  |  0.84    Ca    9.3      03 Apr 2018 05:42  Phos  3.3     04-03  Mg     1.8     04-03            RADIOLOGY & ADDITIONAL STUDIES:        Assessment/Plan:  69 yo F with PMHx of DM, HTN, HLD who presents c/o worsening pain and ulceration of the left lateral malleolus after failed outpatient therapy. Pt is admitted to vascular surgery for IV abx treatment and wound management.      - Zosyn 3/23 - 4/3  - DVT ppx  - Home meds  - Diabetic diet/ISS  - Pain control  - Daily local wound care  - Awaiting [facility] approval for discharge.  -f/u pain management

## 2018-04-03 NOTE — PROGRESS NOTE ADULT - SUBJECTIVE AND OBJECTIVE BOX
Pain Management Progress Note - [facility] [phone]    HPI: Patient seen at 10:30am. Laying in bed in NAD. Continues to c/o pain.      Pertinent PMH: Pain at: ___Back ___Neck___Knee ___Hip ___Shoulder ___ Opioid tolerance __x__ankle pain    Pain is __x_ sharp ____dull __x_burning ___achy ___ Intensity: ____ mild __x__mod ____severe     Location _____surgical site _____cervical _____lumbar ____abd _____upper ext__x__lower ext    Worse with x____activity ___x_movement _____physical therapy___ Rest    Improved with ___x_medication __x__rest ____physical therapy    acetaminophen 300 mG/codeine 30 mG  sodium chloride 0.9%.  vancomycin  IVPB  piperacillin/tazobactam IVPB.  (Floorstock)  sodium polystyrene sulfonate Suspension  docusate sodium  vancomycin  IVPB  piperacillin/tazobactam IVPB.  insulin lispro (HumaLOG) corrective regimen sliding scale  dextrose 5%.  dextrose Gel  dextrose 50% Injectable  dextrose 50% Injectable  dextrose 50% Injectable  glucagon  Injectable  vancomycin  IVPB  piperacillin/tazobactam IVPB.  (ADM OVERRIDE)  collagenase Ointment  simvastatin  losartan  acetaminophen  IVPB.  morphine  - Injectable  ondansetron Injectable  morphine  - Injectable  oxyCODONE    5 mG/acetaminophen 325 mG  (ADM OVERRIDE)  heparin  Injectable  oxyCODONE    5 mG/acetaminophen 325 mG  oxyCODONE    5 mG/acetaminophen 325 mG  pantoprazole    Tablet  senna  gabapentin  sodium chloride 0.9% lock flush      ROS: Const:  ___febrile   Eyes:___ENT:___CV: __-_chest pain  Resp: _-___sob  GI:___nausea ___vomiting ____abd pain ___npo ___clears ___full diet __bm  :___ Musk: __x_pain ___spasm  Skin:___ Neuro:  ___sedation___confusion____ numbness ___weakness ___paresthesia  Psych:___anxiety  Endo:___ Heme:___Allergy:__  __x__ all other systems reviewed and negative       04-03 @ 05:4272 mL/min/1.73M2      Hemoglobin: 9.4 g/dL (04-03 @ 05:42)      T(C): 36.2 (04-03-18 @ 14:47), Max: 36.9 (04-02-18 @ 17:34)  HR: 50 (04-03-18 @ 08:28) (48 - 53)  BP: 128/63 (04-03-18 @ 08:28) (124/58 - 150/66)  RR: 18 (04-03-18 @ 08:28) (15 - 18)  SpO2: 99% (04-03-18 @ 08:28) (97% - 99%)  Wt(kg): --     PHYSICAL EXAM:  Gen Appearance: _x__no acute distress _x__appropriate         Neuro: __x_SILT feet____ EOM Intact Psych: AAOX_3_, _x__mood/affect appropriate        Eyes: x___conjunctiva WNL  _____ Pupils equal and round        ENT: __x_ears and nose atraumatic__x_ Hearing grossly intact        Neck: _x__trachea midline, no visible masses ___thyroid without palpable mass    Resp: __x_Nml WOB____No tactile fremitus ___clear to auscultation    Cardio: __x_extremities free from edema __x__pedal pulses palpable    GI/Abdomen: ___soft ___x__ Nontender___x___Nondistended_____HSM    Lymphatic: _x__no palpable nodes in neck  ___no palpable nodes calves and feet    Skin/Wound: ___Incision, __x_Dressing c/d/i,   __x__surrounding tissues soft,  ___drain/chest tube present____    Muscular: EHL _5__/5  Gastrocnemius_5__/5    __x_absent clubbing/cyanosis         ASSESSMENT:  This is a 68y old Female with a history of:  INFECTED ULCER  No h/o HF  No pertinent family history in first degree relatives  Handoff  MEWS Score  Varicose vein of leg  Type 2 diabetes mellitus without complication, without long-term current use of insulin  Essential hypertension  Hyperlipidemia, unspecified hyperlipidemia type  Infected ulcer of skin  Infected ulcer of skin  Hyperkalemia  Type 2 diabetes mellitus without complication, without long-term current use of insulin  Hyperlipidemia, unspecified hyperlipidemia type  Essential hypertension  Infected ulcer of skin  No significant past surgical history  FOOT PAIN  6        Recommended Treatment PLAN:    1. Recommend starting Oxycodone 5 - 10mg PO q4 prn moderate to severe pain as patient states the tylenol alone is not effective enough and does not last long enough to provide adequate pain control.  Plan discussed with Dr. Gtz

## 2018-04-04 LAB
GLUCOSE BLDC GLUCOMTR-MCNC: 152 MG/DL — HIGH (ref 70–99)
GLUCOSE BLDC GLUCOMTR-MCNC: 236 MG/DL — HIGH (ref 70–99)
GLUCOSE BLDC GLUCOMTR-MCNC: 255 MG/DL — HIGH (ref 70–99)
GLUCOSE BLDC GLUCOMTR-MCNC: 257 MG/DL — HIGH (ref 70–99)

## 2018-04-04 RX ORDER — INSULIN LISPRO 100/ML
4 VIAL (ML) SUBCUTANEOUS
Qty: 0 | Refills: 0 | Status: DISCONTINUED | OUTPATIENT
Start: 2018-04-04 | End: 2018-04-05

## 2018-04-04 RX ORDER — OXYCODONE HYDROCHLORIDE 5 MG/1
5 TABLET ORAL EVERY 6 HOURS
Qty: 0 | Refills: 0 | Status: DISCONTINUED | OUTPATIENT
Start: 2018-04-04 | End: 2018-04-05

## 2018-04-04 RX ORDER — OXYCODONE HYDROCHLORIDE 5 MG/1
5 TABLET ORAL ONCE
Qty: 0 | Refills: 0 | Status: DISCONTINUED | OUTPATIENT
Start: 2018-04-04 | End: 2018-04-04

## 2018-04-04 RX ADMIN — SIMVASTATIN 40 MILLIGRAM(S): 20 TABLET, FILM COATED ORAL at 21:20

## 2018-04-04 RX ADMIN — Medication 4: at 16:39

## 2018-04-04 RX ADMIN — Medication 4 UNIT(S): at 16:45

## 2018-04-04 RX ADMIN — OXYCODONE HYDROCHLORIDE 5 MILLIGRAM(S): 5 TABLET ORAL at 19:03

## 2018-04-04 RX ADMIN — Medication 975 MILLIGRAM(S): at 06:03

## 2018-04-04 RX ADMIN — GABAPENTIN 300 MILLIGRAM(S): 400 CAPSULE ORAL at 06:03

## 2018-04-04 RX ADMIN — GABAPENTIN 300 MILLIGRAM(S): 400 CAPSULE ORAL at 21:19

## 2018-04-04 RX ADMIN — OXYCODONE HYDROCHLORIDE 5 MILLIGRAM(S): 5 TABLET ORAL at 12:05

## 2018-04-04 RX ADMIN — Medication 6: at 11:07

## 2018-04-04 RX ADMIN — OXYCODONE HYDROCHLORIDE 5 MILLIGRAM(S): 5 TABLET ORAL at 19:42

## 2018-04-04 RX ADMIN — Medication 100 MILLIGRAM(S): at 13:51

## 2018-04-04 RX ADMIN — Medication 100 MILLIGRAM(S): at 21:20

## 2018-04-04 RX ADMIN — GABAPENTIN 300 MILLIGRAM(S): 400 CAPSULE ORAL at 13:51

## 2018-04-04 RX ADMIN — HEPARIN SODIUM 5000 UNIT(S): 5000 INJECTION INTRAVENOUS; SUBCUTANEOUS at 14:00

## 2018-04-04 RX ADMIN — Medication 6: at 21:41

## 2018-04-04 RX ADMIN — SODIUM CHLORIDE 3 MILLILITER(S): 9 INJECTION INTRAMUSCULAR; INTRAVENOUS; SUBCUTANEOUS at 05:32

## 2018-04-04 RX ADMIN — HEPARIN SODIUM 5000 UNIT(S): 5000 INJECTION INTRAVENOUS; SUBCUTANEOUS at 21:42

## 2018-04-04 RX ADMIN — SODIUM CHLORIDE 3 MILLILITER(S): 9 INJECTION INTRAMUSCULAR; INTRAVENOUS; SUBCUTANEOUS at 21:21

## 2018-04-04 RX ADMIN — HEPARIN SODIUM 5000 UNIT(S): 5000 INJECTION INTRAVENOUS; SUBCUTANEOUS at 06:04

## 2018-04-04 RX ADMIN — SENNA PLUS 2 TABLET(S): 8.6 TABLET ORAL at 21:20

## 2018-04-04 RX ADMIN — Medication 975 MILLIGRAM(S): at 14:00

## 2018-04-04 RX ADMIN — SODIUM CHLORIDE 3 MILLILITER(S): 9 INJECTION INTRAMUSCULAR; INTRAVENOUS; SUBCUTANEOUS at 13:40

## 2018-04-04 RX ADMIN — Medication 975 MILLIGRAM(S): at 23:02

## 2018-04-04 RX ADMIN — Medication 100 MILLIGRAM(S): at 06:03

## 2018-04-04 RX ADMIN — LOSARTAN POTASSIUM 100 MILLIGRAM(S): 100 TABLET, FILM COATED ORAL at 06:03

## 2018-04-04 RX ADMIN — OXYCODONE HYDROCHLORIDE 5 MILLIGRAM(S): 5 TABLET ORAL at 13:40

## 2018-04-04 RX ADMIN — Medication 975 MILLIGRAM(S): at 14:27

## 2018-04-04 RX ADMIN — Medication 2: at 07:57

## 2018-04-04 RX ADMIN — OXYCODONE HYDROCHLORIDE 5 MILLIGRAM(S): 5 TABLET ORAL at 06:04

## 2018-04-04 RX ADMIN — Medication 975 MILLIGRAM(S): at 22:06

## 2018-04-04 RX ADMIN — INSULIN GLARGINE 15 UNIT(S): 100 INJECTION, SOLUTION SUBCUTANEOUS at 21:42

## 2018-04-04 RX ADMIN — Medication 1 APPLICATION(S): at 09:36

## 2018-04-04 NOTE — CONSULT NOTE ADULT - SUBJECTIVE AND OBJECTIVE BOX
HPI: Patient is a 68y old  Female with PMHx of HTN, HLD, DM who presents c/o of worsening left lateral malleolar ulcer and pain x 1 month. Per patient one month ago she had varicosity that "burst" in the same location and subsequently developed progressive blistering and ulceration of the skin. Pt failed outpatient tx of Keflex, bactrim and amoxicillin. Ulcer has worsened since Tuesday per patient, with associated subjective fever/chills, purulent drainage from the wound, N/V, multiple episodes of diarrhea, and severe sharp pain in the LLE not controlled with Percocet. Patient is admitted to vascular surgery for IV antibiotics and wound management.    Assessment/Plan:  67 yo F with PMHx of DM, HTN, HLD who presents c/o worsening pain and ulceration of the left lateral malleolus after failed outpatient therapy. Pt is admitted to vascular surgery for IV abx treatment and wound management.      - PT reevaluate pt with stairs.   - Zosyn 3/23 - 4/3  - DVT ppx  - Home meds  - Diabetic diet/ISS  - Pain control  - Daily local wound care  - Awaiting [facility] pending auth.   -f/u pain management        Age at Dx:  How dx:  Hx and duration of insulin:  Current Therapy:  Hx of hypoglycemia  Hx of DKA/HHS?    Home FSG:  Fasting  Lunch  Dinner  Bed    Hx of other regimens  Complications:  Outpatient Endo:    PMH & Surgical Hx:INFECTED ULCER  No h/o HF  No pertinent family history in first degree relatives  Handoff  MEWS Score  Varicose vein of leg  Type 2 diabetes mellitus without complication, without long-term current use of insulin  Essential hypertension  Hyperlipidemia, unspecified hyperlipidemia type  Infected ulcer of skin  Infected ulcer of skin  Hyperkalemia  Type 2 diabetes mellitus without complication, without long-term current use of insulin  Hyperlipidemia, unspecified hyperlipidemia type  Essential hypertension  Infected ulcer of skin  No significant past surgical history  FOOT PAIN  6      FH:  DM:  Thyroid:  Autoimmune:  Other:    SH:  Smoking  Etoh:  Recreational Drugs:  Social Life:    Current Meds:  acetaminophen   Tablet. 975 milliGRAM(s) Oral every 8 hours  collagenase Ointment 1 Application(s) Topical daily  dextrose 5%. 1000 milliLiter(s) IV Continuous <Continuous>  dextrose 50% Injectable 12.5 Gram(s) IV Push once  dextrose 50% Injectable 25 Gram(s) IV Push once  dextrose 50% Injectable 25 Gram(s) IV Push once  dextrose Gel 1 Dose(s) Oral once PRN  docusate sodium 100 milliGRAM(s) Oral three times a day  gabapentin 300 milliGRAM(s) Oral three times a day  glucagon  Injectable 1 milliGRAM(s) IntraMuscular once PRN  heparin  Injectable 5000 Unit(s) SubCutaneous every 8 hours  insulin glargine Injectable (LANTUS) 15 Unit(s) SubCutaneous at bedtime  insulin lispro (HumaLOG) corrective regimen sliding scale   SubCutaneous Before meals and at bedtime  losartan 100 milliGRAM(s) Oral daily  senna 2 Tablet(s) Oral at bedtime  simvastatin 40 milliGRAM(s) Oral at bedtime  sodium chloride 0.9% lock flush 3 milliLiter(s) IV Push every 8 hours      Allergies:  codeine (Vomiting)  No Known Allergies      ROS:  Denies the following except as indicated.    General: weight loss/weight gain, decreased appetite, fatigue  Eyes: Blurry vision, double vision, visual changes  ENT: Throat pain, changes in voice,   CV: palpitations, SOB, CP, cough  GI: NVD, difficulty swallowing, abdominal pain  : polyuria, dysuria  Endo: abnormal menses, temperature intolerance, decreased libido  MSK: weakness, joint pain  Skin: rash, dryness, diaphoresis  Heme: Easy bruising,bleeding  Neuro: HA, dizziness, lightheadedness, numbness tingling  Psych: Anxiety, Depression    Vital Signs Last 24 Hrs  T(C): 36.2 (04 Apr 2018 05:47), Max: 36.6 (03 Apr 2018 18:13)  T(F): 97.1 (04 Apr 2018 05:47), Max: 97.9 (03 Apr 2018 22:11)  HR: 54 (04 Apr 2018 06:02) (48 - 55)  BP: 124/58 (04 Apr 2018 06:02) (111/55 - 134/61)  BP(mean): --  RR: 16 (04 Apr 2018 06:02) (16 - 18)  SpO2: 98% (04 Apr 2018 06:02) (96% - 99%)        Constitutional: wn/wd in NAD.   HEENT: NCAT, MMM, OP clear, EOMI, , no proptosis or lid retraction  Neck: no thyromegaly or palpable thyroid nodules   Respiratory: lungs CTAB.  Cardiovascular: regular rhythm, normal S1 and S2, no audible murmurs, no peripheral edema  GI: soft, NT/ND, no masses/HSM appreciated.  Neurology: no tremors, DTR 2+  Skin: no visible rashes/lesions  Psychiatric: AAO x 3, normal affect/mood.  Ext: radial pulses intact, DP pulses intact, extremities warm, no cyanosis, clubbing or edema.       LABS:                        9.4    2.5   )-----------( 176      ( 03 Apr 2018 05:42 )             31.5     04-03    139  |  104  |  11  ----------------------------<  195<H>  4.2   |  25  |  0.84    Ca    9.3      03 Apr 2018 05:42  Phos  3.3     04-03  Mg     1.8     04-03          Hemoglobin A1C, Whole Blood: 7.3 (03-24 @ 06:53)        RADIOLOGY & ADDITIONAL STUDIES:  CAPILLARY BLOOD GLUCOSE      POCT Blood Glucose.: 152 mg/dL (04 Apr 2018 06:09)  POCT Blood Glucose.: 279 mg/dL (03 Apr 2018 21:48)  POCT Blood Glucose.: 276 mg/dL (03 Apr 2018 16:03)  POCT Blood Glucose.: 294 mg/dL (03 Apr 2018 11:00)        A/P:68y Female    1.  DM--WILL DISCUSS in ROUNDS  Please continue lantus       units at night / morning.  Please continue lispro      units before each meal.  Please continue lispro moderate / low dose sliding scale four times daily with meals and at bedtime    Pt's fingerstick glucose goal is     Will continue to monitor     For discharge, pt can continue    Pt can follow up at discharge with [facility] by calling  to make an appointment.   Will discuss case with     and update primary team HPI: Patient is a 68y old  Female with PMHx of HTN, HLD, DM who presents c/o of worsening left lateral malleolar ulcer and pain x 1 month. Per patient one month ago she had varicosity that "burst" in the same location and subsequently developed progressive blistering and ulceration of the skin. Pt failed outpatient tx of Keflex, bactrim and amoxicillin; ulcer  worsened since with associated subjective fever/chills, purulent drainage from the wound, N/V, multiple episodes of diarrhea, and severe sharp pain in the LLE not controlled with Percocet. Patient is admitted to vascular surgery for IV antibiotics, wound management, and pain management. Pt reports feeling better and decreased wound drainage. Pain improving, but still shooting pains and pain on  ambulation.  Abx course completed yesterday. Dispo to [facility].    Endo: A1C 7.3%. Pt denies polyruia/polydipsia. Denies neuropathy. Limited vision due to eye cysts. No retinopathy. Reports recent weight loss of 10lbs due to loss of appetite during illness. Usually has good diet. B: oatmeal and fruit, egg. L: chicken, veg, rice. D: green juice, burns shrimp or salmon and rice. 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 Pain management. Pt anxious about illness interfering with work/ADLS.    Age at Dx: 32 y/o  How dx: gestational diabetes  Hx and duration of insulin: has been on intermittently in past  Current Therapy: Metformin 500mg daily, pioglitizone 45mg daily, glipizide 5mg daily, invokana 100mg. trulicity recently added  Hx of hypoglycemia: hypoglycemic awareness, but doesn't fall below 70  Hx of DKA/HHS: No    Home FSG: checks daily in AM; range 100-130.    Hx of other regimens: Victoza (couldn't tolerate GI SE)  Complications:   Outpatient Endo: Dr Drake    FSG&insulin:  : Bed . Received Lantus 8 units and lispro 4 units.  4/3:   B: . Received lispro 2 units.  L: . Received lispro 6 units.  D: . Received lispro 6 units. Ate chicken, spinach, mashed potatoes. Snack: zaki cracker.  B: . Received Lantus 15 units and lispro 6 units.  "  B: . Received lispro 2 units. Ate Arabic toast, 2 sausage, 1 egg, mixed fruit.    PMH & Surgical Hx:  INFECTED ULCER  Varicose vein of leg  Type 2 diabetes mellitus without complication, without long-term current use of insulin  Essential hypertension  Hyperlipidemia, unspecified hyperlipidemia type  Hyperkalemia    FH:  DM: maternal grandmother and aunts  Thyroid: no  Autoimmune: no   Other: mom  of breast CA in 50s    SH: No  Smoking: No  Etoh: No  Recreational Drugs: No  Social Life: lives alone, works at [employer], has son who serves    Current Meds:  acetaminophen   Tablet. 975 milliGRAM(s) Oral every 8 hours  collagenase Ointment 1 Application(s) Topical daily  dextrose 5%. 1000 milliLiter(s) IV Continuous <Continuous>  dextrose 50% Injectable 12.5 Gram(s) IV Push once  dextrose 50% Injectable 25 Gram(s) IV Push once  dextrose 50% Injectable 25 Gram(s) IV Push once  dextrose Gel 1 Dose(s) Oral once PRN  docusate sodium 100 milliGRAM(s) Oral three times a day  gabapentin 300 milliGRAM(s) Oral three times a day  glucagon  Injectable 1 milliGRAM(s) IntraMuscular once PRN  heparin  Injectable 5000 Unit(s) SubCutaneous every 8 hours  insulin glargine Injectable (LANTUS) 15 Unit(s) SubCutaneous at bedtime  insulin lispro (HumaLOG) corrective regimen sliding scale   SubCutaneous Before meals and at bedtime  losartan 100 milliGRAM(s) Oral daily  senna 2 Tablet(s) Oral at bedtime  simvastatin 40 milliGRAM(s) Oral at bedtime  sodium chloride 0.9% lock flush 3 milliLiter(s) IV Push every 8 hours    Allergies:  codeine (Vomiting)  No Known Allergies    ROS:  Denies the following except as indicated.    General: weight gain, decreased appetite, fatigue  Eyes: Blurry vision, double vision, visual changes  ENT: Throat pain, changes in voice,   CV: palpitations, SOB, CP, cough  GI: NVD, difficulty swallowing, abdominal pain  : polyuria, dysuria  MSK: weakness, joint pain  Skin: rash, dryness, diaphoresis  Heme: Easy bruising, bleeding  Neuro: HA, dizziness, lightheadedness, numbness tingling  Psych:  Depression    Vital Signs Last 24 Hrs  T(C): 36.2 (2018 05:47), Max: 36.6 (2018 18:13)  T(F): 97.1 (2018 05:47), Max: 97.9 (2018 22:11)  HR: 54 (2018 06:02) (48 - 55)  BP: 124/58 (2018 06:02) (111/55 - 134/61)  BP(mean): --  RR: 16 (2018 06:02) (16 - 18)  SpO2: 98% (2018 06:02) (96% - 99%)    Constitutional: wn/wd in NAD.   HEENT: NCAT, MMM, OP clear, EOMI, , no proptosis or lid retraction  Neck: no thyromegaly or palpable thyroid nodules   Respiratory: lungs CTAB.  Cardiovascular: regular rhythm, normal S1 and S2, no audible murmurs, no peripheral edema  GI: soft, NT/ND, no masses/HSM appreciated.  Neurology: no tremors, DTR 2+  Skin: no visible rashes/lesions  Psychiatric: AAO x 3, normal affect/mood.  Ext: radial pulses intact, DP pulses intact, extremities warm, no cyanosis, clubbing or edema.      LABS:                        9.4    2.5   )-----------( 176      ( 2018 05:42 )             31.5     04-03    139  |  104  |  11  ----------------------------<  195<H>  4.2   |  25  |  0.84    Ca    9.3      2018 05:42  Phos  3.3     04-03  Mg     1.8     04-03      Hemoglobin A1C, Whole Blood: 7.3 ( @ 06:53)    CAPILLARY BLOOD GLUCOSE    POCT Blood Glucose.: 152 mg/dL (2018 06:09)  POCT Blood Glucose.: 279 mg/dL (2018 21:48)  POCT Blood Glucose.: 276 mg/dL (2018 16:03)  POCT Blood Glucose.: 294 mg/dL (2018 11:00)    A/P: 68y Female with DM2 and diabetic foot ulcer left lateral malleolous now s/p IV abx awaiting discharge to [facility] requiring insulin for hyperglycemia.    1.  DM--WILL DISCUSS in ROUNDS  Please continue lantus       units at night / morning.  Please continue lispro      units before each meal.  Please continue lispro moderate / low dose sliding scale four times daily with meals and at bedtime    Pt's fingerstick glucose goal is 100-180.    Will continue to monitor.    For discharge, pt can continue    Pt can follow up at discharge with [facility] by calling  to make an appointment.   Will discuss case with Dr. Quintanilla and update primary team

## 2018-04-04 NOTE — CHART NOTE - NSCHARTNOTEFT_GEN_A_CORE
Admitting Diagnosis:   Patient is a 68y old  Female who presents with a chief complaint of Infected LLE lateral malleolus ulcer (27 Mar 2018 07:57) s/p IV abx awaiting discharge to [facility] requiring insulin for hyperglycemia.      PAST MEDICAL & SURGICAL HISTORY:  Varicose vein of leg  Type 2 diabetes mellitus without complication, without long-term current use of insulin  Essential hypertension  Hyperlipidemia, unspecified hyperlipidemia type  No significant past surgical history      Current Nutrition Order: cst cho W/ snack       PO Intake: Good (%) [ X ]  Fair (50-75%) [   ] Poor (<25%) [   ]    GI Issues: none    Pain: reports intermittent shooting pain on L foot    Skin Integrity: wound- L foot ulcer    Labs:   04-03    139  |  104  |  11  ----------------------------<  195<H>  4.2   |  25  |  0.84    Ca    9.3      03 Apr 2018 05:42  Phos  3.3     04-03  Mg     1.8     04-03      CAPILLARY BLOOD GLUCOSE      POCT Blood Glucose.: 257 mg/dL (04 Apr 2018 11:01)  POCT Blood Glucose.: 152 mg/dL (04 Apr 2018 06:09)  POCT Blood Glucose.: 279 mg/dL (03 Apr 2018 21:48)      Medications:  MEDICATIONS  (STANDING):  acetaminophen   Tablet. 975 milliGRAM(s) Oral every 8 hours  collagenase Ointment 1 Application(s) Topical daily  dextrose 5%. 1000 milliLiter(s) (50 mL/Hr) IV Continuous <Continuous>  dextrose 50% Injectable 12.5 Gram(s) IV Push once  dextrose 50% Injectable 25 Gram(s) IV Push once  dextrose 50% Injectable 25 Gram(s) IV Push once  docusate sodium 100 milliGRAM(s) Oral three times a day  gabapentin 300 milliGRAM(s) Oral three times a day  heparin  Injectable 5000 Unit(s) SubCutaneous every 8 hours  insulin glargine Injectable (LANTUS) 15 Unit(s) SubCutaneous at bedtime  insulin lispro (HumaLOG) corrective regimen sliding scale   SubCutaneous Before meals and at bedtime  losartan 100 milliGRAM(s) Oral daily  senna 2 Tablet(s) Oral at bedtime  simvastatin 40 milliGRAM(s) Oral at bedtime  sodium chloride 0.9% lock flush 3 milliLiter(s) IV Push every 8 hours    MEDICATIONS  (PRN):  dextrose Gel 1 Dose(s) Oral once PRN Blood Glucose LESS THAN 70 milliGRAM(s)/deciliter  glucagon  Injectable 1 milliGRAM(s) IntraMuscular once PRN Glucose LESS THAN 70 milligrams/deciliter  oxyCODONE    IR 5 milliGRAM(s) Oral every 6 hours PRN Severe Pain (7 - 10)      Weight:   3/23 (daily): 79.1kg  3/25 (daily): 78.9kg    Weight Change: wt change in recorded wts stable w/i 1 kg    Estimated energy needs: Height 67":; ABW 78.9kg ; IBW 61.2kg; 129%IBW BMI 27.2  Ideal body weight used for calculations as pt >120% of IBW. Needs estimated 2/2 maintenance in older adults  Kcal: 25-30kcal/IBW: 1224-1530kcal  protein: 1.0-1.2g/IBW: 61.2-73.44g  Fluid: 30-35ml/IBW: 1863-2142ml    Subjective:  pt reports that her appetite has improved; was able to consume >75% from her meals today; denies N/V/D/C; had x3 BM yesterday; w/ c/o intermittent, shooting pain to L foot; receiving pain meds; diet education on CST CHO reviewed.      Previous Nutrition Diagnosis: Unintended weight loss RT inadequate energy intake 2/2 pain AEB 5# weight loss over 1 months      Active [  X ]  Resolved [   ]-improving    If resolved, new PES:     Goal: pt to meet  >75% EER and defer further wt loss    Recommendations:   -honor pt's food preferences w/i diet restrictions  -provide nutrition edu prn    Education:  CST CHO diet reviewed    Risk Level: High [   ] Moderate [  X ] Low [   ]

## 2018-04-04 NOTE — PROGRESS NOTE ADULT - SUBJECTIVE AND OBJECTIVE BOX
Pain Management Progress Note - [facility] [phone]    HPI: Patient seen at 9:45am. Laying in bed in NAD. Continues to c/o pain but states it is better controlled.      Pertinent PMH: Pain at: ___Back ___Neck___Knee ___Hip ___Shoulder ___ Opioid tolerance _x_ankle     Pain is _x__ sharp ____dull __x_burning ___achy ___ Intensity: ____ mild _x___mod ____severe     Location _____surgical site _____cervical _____lumbar ____abd _____upper ext___x_lower ext    Worse with __x__activity __x__movement _____physical therapy___ Rest    Improved with __x__medication _x___rest ____physical therapy    acetaminophen 300 mG/codeine 30 mG  sodium chloride 0.9%.  vancomycin  IVPB  piperacillin/tazobactam IVPB.  (Floorstock)  sodium polystyrene sulfonate Suspension  docusate sodium  vancomycin  IVPB  piperacillin/tazobactam IVPB.  insulin lispro (HumaLOG) corrective regimen sliding scale  dextrose 5%.  dextrose Gel  dextrose 50% Injectable  dextrose 50% Injectable  dextrose 50% Injectable  glucagon  Injectable  vancomycin  IVPB  piperacillin/tazobactam IVPB.  (ADM OVERRIDE)  collagenase Ointment  simvastatin  losartan  acetaminophen  IVPB.  morphine  - Injectable  oxyCODONE    5 mG/acetaminophen 325 mG  heparin  Injectable  oxyCODONE    5 mG/acetaminophen 325 mG  pantoprazole    Tablet  senna  gabapentin  sodium chloride 0.9% lock flush  pregabalin  gabapentin  HYDROmorphone  Injectable  piperacillin/tazobactam IVPB.  senna  magnesium sulfate  IVPB      ROS: Const:  ___febrile   Eyes:___ENT:___CV: _-__chest pain  Resp: ___-_sob  GI:___nausea ___vomiting ____abd pain ___npo ___clears ___full diet __bm  :___ Musk: _x__pain ___spasm  Skin:___ Neuro:  ___sedation___confusion____ numbness ___weakness ___paresthesia  Psych:___anxiety  Endo:___ Heme:___Allergy:___  __x__ all other systems reviewed and negative       Hemoglobin: 9.4 g/dL (04-03 @ 05:42)      T(C): 36.2 (04-04-18 @ 05:47), Max: 36.6 (04-03-18 @ 18:13)  HR: 56 (04-04-18 @ 09:09) (48 - 56)  BP: 142/62 (04-04-18 @ 09:09) (111/55 - 142/62)  RR: 16 (04-04-18 @ 09:09) (16 - 18)  SpO2: 98% (04-04-18 @ 09:09) (96% - 99%)  Wt(kg): --     PHYSICAL EXAM:  Gen Appearance: __x_no acute distress __x_appropriate         Neuro: __x_SILT feet____ EOM Intact Psych: AAOX_3_, __x_mood/affect appropriate        Eyes: __x_conjunctiva WNL  _____ Pupils equal and round        ENT: __x_ears and nose atraumatic__x_ Hearing grossly intact        Neck: _x__trachea midline, no visible masses ___thyroid without palpable mass    Resp: __x_Nml WOB____No tactile fremitus ___clear to auscultation    Cardio: _x__extremities free from edema __x__pedal pulses palpable    GI/Abdomen: _x__soft __x___ Nontender___x___Nondistended_____HSM    Lymphatic: __x_no palpable nodes in neck  ___no palpable nodes calves and feet    Skin/Wound: ___Incision, _x__Dressing c/d/i,   ___x_surrounding tissues soft,  ___drain/chest tube present____    Muscular: EHL _5__/5  Gastrocnemius_5__/5    _x__absent clubbing/cyanosis         ASSESSMENT:  This is a 68y old Female with a history of:  INFECTED ULCER  No h/o HF  No pertinent family history in first degree relatives  Handoff  MEWS Score  Varicose vein of leg  Type 2 diabetes mellitus without complication, without long-term current use of insulin  Essential hypertension  Hyperlipidemia, unspecified hyperlipidemia type  Infected ulcer of skin  Infected ulcer of skin  Hyperkalemia  Type 2 diabetes mellitus without complication, without long-term current use of insulin  Hyperlipidemia, unspecified hyperlipidemia type  Essential hypertension  Infected ulcer of skin  No significant past surgical history  FOOT PAIN  6        Recommended Treatment PLAN:    1. Continue current regimen at patient is tolerating well.  2. Can increase Oxycodone 5mg to q4 hours if increased pain.  Plan discussed with Dr. Gtz

## 2018-04-04 NOTE — PROGRESS NOTE ADULT - SUBJECTIVE AND OBJECTIVE BOX
24hr Events:  O/N: ROXANN, VSS  4/3: pending CAROLINE approval.  Lantus increased from 8units to 15units tonight.        Assessment/Plan:  67 yo F with PMHx of DM, HTN, HLD who presents c/o worsening pain and ulceration of the left lateral malleolus after failed outpatient therapy. Pt is admitted to vascular surgery for IV abx treatment and wound management.      - Zosyn 3/23 - 4/3  - DVT ppx  - Home meds  - Diabetic diet/ISS  - Pain control  - Daily local wound care  - Awaiting CAROLINE approval for discharge.  -f/u pain management 24hr Events:  O/N: ROXANN, VSS  4/3: pending CAROLINE approval.  Lantus increased from 8units to 15units tonight.    S. c/o pain intermittently in leg.    heparin  Injectable 5000  losartan 100      Allergies    No Known Allergies    Intolerances    codeine (Vomiting)      Vital Signs Last 24 Hrs  T(C): 36.2 (04 Apr 2018 05:47), Max: 36.6 (03 Apr 2018 18:13)  T(F): 97.1 (04 Apr 2018 05:47), Max: 97.9 (03 Apr 2018 22:11)  HR: 54 (04 Apr 2018 06:02) (48 - 55)  BP: 124/58 (04 Apr 2018 06:02) (111/55 - 134/61)  BP(mean): --  RR: 16 (04 Apr 2018 06:02) (16 - 18)  SpO2: 98% (04 Apr 2018 06:02) (96% - 99%)    Physical Exam:  General: awake, alert, NAD  Pulmonary:  Cardiovascular:  Abdominal:  Extremities: Left leg lateral malleolus ulcer healed covered with dry scab.   Pulses:   Right:                                                                           Left:  FEM [ ]2+ [ ]1+ [ ] doppler                                            FEM [ ]2+ [ ]1+ [ ] doppler    POP [ ]2+ [ ]1+ [ ] doppler                                            POP [ ]2+ [ ]1+ [ ] doppler    DP [ ]2+ [ ]1+ [ ] doppler                                               DP [ ]2+ [ ]1+ [ ] doppler  PT[ ]2+ [ ]1+ [ ] doppler                                                 PT [ ]2+ [ ]1+ [ ] doppler      LABS:                        9.4    2.5   )-----------( 176      ( 03 Apr 2018 05:42 )             31.5     04-03    139  |  104  |  11  ----------------------------<  195<H>  4.2   |  25  |  0.84    Ca    9.3      03 Apr 2018 05:42  Phos  3.3     04-03  Mg     1.8     04-03            RADIOLOGY & ADDITIONAL TESTS:      Assessment/Plan:  67 yo F with PMHx of DM, HTN, HLD who presents c/o worsening pain and ulceration of the left lateral malleolus after failed outpatient therapy. Pt is admitted to vascular surgery for IV abx treatment and wound management.      - PT reevaluate pt with stairs.   - Zosyn 3/23 - 4/3  - DVT ppx  - Home meds  - Diabetic diet/ISS  - Pain control  - Daily local wound care  - Awaiting CAROLINE pending auth.   -f/u pain management

## 2018-04-05 VITALS — TEMPERATURE: 97 F

## 2018-04-05 LAB
GLUCOSE BLDC GLUCOMTR-MCNC: 133 MG/DL — HIGH (ref 70–99)
GLUCOSE BLDC GLUCOMTR-MCNC: 289 MG/DL — HIGH (ref 70–99)

## 2018-04-05 PROCEDURE — 85027 COMPLETE CBC AUTOMATED: CPT

## 2018-04-05 PROCEDURE — 97161 PT EVAL LOW COMPLEX 20 MIN: CPT

## 2018-04-05 PROCEDURE — 81003 URINALYSIS AUTO W/O SCOPE: CPT

## 2018-04-05 PROCEDURE — 93005 ELECTROCARDIOGRAM TRACING: CPT

## 2018-04-05 PROCEDURE — 84100 ASSAY OF PHOSPHORUS: CPT

## 2018-04-05 PROCEDURE — 85025 COMPLETE CBC W/AUTO DIFF WBC: CPT

## 2018-04-05 PROCEDURE — 87184 SC STD DISK METHOD PER PLATE: CPT

## 2018-04-05 PROCEDURE — 82962 GLUCOSE BLOOD TEST: CPT

## 2018-04-05 PROCEDURE — 96374 THER/PROPH/DIAG INJ IV PUSH: CPT

## 2018-04-05 PROCEDURE — 80053 COMPREHEN METABOLIC PANEL: CPT

## 2018-04-05 PROCEDURE — 87186 SC STD MICRODIL/AGAR DIL: CPT

## 2018-04-05 PROCEDURE — 97110 THERAPEUTIC EXERCISES: CPT

## 2018-04-05 PROCEDURE — 36415 COLL VENOUS BLD VENIPUNCTURE: CPT

## 2018-04-05 PROCEDURE — 83735 ASSAY OF MAGNESIUM: CPT

## 2018-04-05 PROCEDURE — 97116 GAIT TRAINING THERAPY: CPT

## 2018-04-05 PROCEDURE — 87040 BLOOD CULTURE FOR BACTERIA: CPT

## 2018-04-05 PROCEDURE — 80048 BASIC METABOLIC PNL TOTAL CA: CPT

## 2018-04-05 PROCEDURE — 87086 URINE CULTURE/COLONY COUNT: CPT

## 2018-04-05 PROCEDURE — 99222 1ST HOSP IP/OBS MODERATE 55: CPT | Mod: GC

## 2018-04-05 PROCEDURE — 99285 EMERGENCY DEPT VISIT HI MDM: CPT | Mod: 25

## 2018-04-05 PROCEDURE — 83036 HEMOGLOBIN GLYCOSYLATED A1C: CPT

## 2018-04-05 PROCEDURE — 87070 CULTURE OTHR SPECIMN AEROBIC: CPT

## 2018-04-05 PROCEDURE — 73610 X-RAY EXAM OF ANKLE: CPT

## 2018-04-05 RX ORDER — GABAPENTIN 400 MG/1
1 CAPSULE ORAL
Qty: 90 | Refills: 1 | OUTPATIENT
Start: 2018-04-05 | End: 2018-06-03

## 2018-04-05 RX ADMIN — Medication 975 MILLIGRAM(S): at 06:55

## 2018-04-05 RX ADMIN — Medication 975 MILLIGRAM(S): at 14:01

## 2018-04-05 RX ADMIN — Medication 4 UNIT(S): at 07:54

## 2018-04-05 RX ADMIN — OXYCODONE HYDROCHLORIDE 5 MILLIGRAM(S): 5 TABLET ORAL at 08:56

## 2018-04-05 RX ADMIN — Medication 975 MILLIGRAM(S): at 06:06

## 2018-04-05 RX ADMIN — Medication 4 UNIT(S): at 11:39

## 2018-04-05 RX ADMIN — Medication 6: at 11:39

## 2018-04-05 RX ADMIN — Medication 100 MILLIGRAM(S): at 14:01

## 2018-04-05 RX ADMIN — Medication 100 MILLIGRAM(S): at 06:02

## 2018-04-05 RX ADMIN — GABAPENTIN 300 MILLIGRAM(S): 400 CAPSULE ORAL at 06:02

## 2018-04-05 RX ADMIN — OXYCODONE HYDROCHLORIDE 5 MILLIGRAM(S): 5 TABLET ORAL at 10:03

## 2018-04-05 RX ADMIN — LOSARTAN POTASSIUM 100 MILLIGRAM(S): 100 TABLET, FILM COATED ORAL at 06:02

## 2018-04-05 RX ADMIN — GABAPENTIN 300 MILLIGRAM(S): 400 CAPSULE ORAL at 14:01

## 2018-04-05 RX ADMIN — HEPARIN SODIUM 5000 UNIT(S): 5000 INJECTION INTRAVENOUS; SUBCUTANEOUS at 06:02

## 2018-04-05 RX ADMIN — SODIUM CHLORIDE 3 MILLILITER(S): 9 INJECTION INTRAMUSCULAR; INTRAVENOUS; SUBCUTANEOUS at 11:58

## 2018-04-05 RX ADMIN — SODIUM CHLORIDE 3 MILLILITER(S): 9 INJECTION INTRAMUSCULAR; INTRAVENOUS; SUBCUTANEOUS at 05:51

## 2018-04-05 RX ADMIN — Medication 1 APPLICATION(S): at 08:52

## 2018-04-05 NOTE — PROGRESS NOTE ADULT - SUBJECTIVE AND OBJECTIVE BOX
Pain Management Progress Note - [facility] [phone]    HPI: Patient seen at 10:30am. Seen laying in bed in NAD Continues to c/o pain.      Pertinent PMH: Pain at: ___Back ___Neck___Knee ___Hip ___Shoulder ___ Opioid tolerance __x__ankle pain    Pain is __x_ sharp ____dull ___burning _x__achy ___ Intensity: ____ mild __x__mod ____severe     Location __x___surgical site _____cervical ___x__lumbar ____abd _____upper ext___x_lower ext    Worse with _x___activity _x___movement _____physical therapy___ Rest    Improved with _x___medication _x___rest ____physical therapy    acetaminophen 300 mG/codeine 30 mG  sodium chloride 0.9%.  vancomycin  IVPB  piperacillin/tazobactam IVPB.  (Floorstock)  sodium polystyrene sulfonate Suspension  docusate sodium  vancomycin  IVPB  piperacillin/tazobactam IVPB.  insulin lispro (HumaLOG) corrective regimen sliding scale  dextrose 5%.  dextrose Gel  dextrose 50% Injectable  glucagon  Injectable  vancomycin  IVPB  piperacillin/tazobactam IVPB.  (ADM OVERRIDE)  collagenase Ointment  simvastatin  losartan  acetaminophen  IVPB.  morphine  - Injectable  ondansetron Injectable  morphine  - Injectable  oxyCODONE    5 mG/acetaminophen 325 mG  piperacillin/tazobactam IVPB.  senna  magnesium sulfate  IVPB  insulin lispro Injectable (HumaLOG)      ROS: Const:  _-__febrile   Eyes:___ENT:___CV: __-_chest pain  Resp: __-__sob  GI:___nausea ___vomiting ____abd pain ___npo ___clears ___full diet __bm  :___ Musk: _x__pain ___spasm  Skin:___ Neuro:  ___sedation___confusion____ numbness ___weakness ___paresthesia  Psych:___anxiety  Endo:___ Heme:___Allergy:___        T(C): 36.7 (04-05-18 @ 08:41), Max: 36.7 (04-04-18 @ 14:39)  HR: 70 (04-05-18 @ 11:56) (50 - 70)  BP: 150/68 (04-05-18 @ 11:56) (109/55 - 164/72)  RR: 16 (04-05-18 @ 11:56) (16 - 18)  SpO2: 98% (04-05-18 @ 11:56) (96% - 99%)  Wt(kg): --     PHYSICAL EXAM:  Gen Appearance: _x__no acute distress _x__appropriate         Neuro: _x__SILT feet____ EOM Intact Psych: AAOX_3_, _x__mood/affect appropriate        Eyes: __x_conjunctiva WNL  __x___ Pupils equal and round        ENT: _x__ears and nose atraumatic_x__ Hearing grossly intact        Neck: _x__trachea midline, no visible masses ___thyroid without palpable mass    Resp: _x__Nml WOB____No tactile fremitus ___clear to auscultation    Cardio: __x_extremities free from edema __x__pedal pulses palpable    GI/Abdomen: _x__soft ___x__ Nontender____x__Nondistended_____HSM    Lymphatic: ___no palpable nodes in neck  ___no palpable nodes calves and feet    Skin/Wound: ___Incision, x___Dressing c/d/i,   __x__surrounding tissues soft,  ___drain/chest tube present____    Muscular: EHL __x_/5  Gastrocnemius___/5    _x__absent clubbing/cyanosis         ASSESSMENT:  This is a 68y old Female with a history of:  INFECTED ULCER  No h/o HF  No pertinent family history in first degree relatives  Handoff  MEWS Score  Varicose vein of leg  Type 2 diabetes mellitus without complication, without long-term current use of insulin  Essential hypertension  Hyperlipidemia, unspecified hyperlipidemia type  Infected ulcer of skin  Infected ulcer of skin  Hyperkalemia  Type 2 diabetes mellitus without complication, without long-term current use of insulin  Hyperlipidemia, unspecified hyperlipidemia type  Essential hypertension  Infected ulcer of skin  No significant past surgical history  FOOT PAIN  6        Recommended Treatment PLAN:    1. Recommend increasing oxycodone dose to 5 - 10mg po q4 prn moderate to severe pain.  Plan discussed with Dr. Harvey

## 2018-04-05 NOTE — PROGRESS NOTE ADULT - SUBJECTIVE AND OBJECTIVE BOX
O/N: ROXANN, VSS            Assessment/Plan:  67 yo F with PMHx of DM, HTN, HLD who presents c/o worsening pain and ulceration of the left lateral malleolus after failed outpatient therapy. Pt is admitted to vascular surgery for IV abx treatment and wound management.        - Zosyn 3/23 - 4/3  - DVT ppx  - Home meds  - Diabetic diet/ISS  - Pain control  - Daily local wound care  - Awaiting CAROLINE pending auth.   -f/u pain management O/N: ROXANN, VSS       SUBJECTIVE: Patient seen and examined bedside by chief resident. Discharge pending CAROLINE approval.    heparin  Injectable 5000 Unit(s) SubCutaneous every 8 hours  losartan 100 milliGRAM(s) Oral daily      Vital Signs Last 24 Hrs  T(C): 35.7 (05 Apr 2018 06:23), Max: 36.9 (04 Apr 2018 10:34)  T(F): 96.3 (05 Apr 2018 06:23), Max: 98.4 (04 Apr 2018 10:34)  HR: 50 (05 Apr 2018 05:59) (50 - 62)  BP: 134/63 (05 Apr 2018 05:59) (109/55 - 168/72)  BP(mean): --  RR: 16 (05 Apr 2018 05:59) (16 - 18)  SpO2: 96% (05 Apr 2018 05:59) (96% - 98%)  I&O's Detail    04 Apr 2018 07:01  -  05 Apr 2018 07:00  --------------------------------------------------------  IN:    Oral Fluid: 480 mL  Total IN: 480 mL    OUT:  Total OUT: 0 mL    Total NET: 480 mL          General: NAD, resting comfortably in bed  C/V: NSR  Pulm: Nonlabored breathing, no respiratory distress  Abd: soft, NT/ND.  Extrem: LLE lateral malleolus ulcer healed covered with dry scab.         LABS:                RADIOLOGY & ADDITIONAL STUDIES:          Assessment/Plan:  67 yo F with PMHx of DM, HTN, HLD who presents c/o worsening pain and ulceration of the left lateral malleolus after failed outpatient therapy. Pt is admitted to vascular surgery for IV abx treatment and wound management.        - Zosyn 3/23 - 4/3  - DVT ppx  - Home meds  - Diabetic diet/ISS  - Pain control  - Daily local wound care  - Awaiting CAROLINE pending auth.   -f/u pain management

## 2018-04-06 NOTE — CONSULT NOTE ADULT - CONSULT REQUESTED DATE/TIME
27-Mar-2018 12:25 Risk factors include single parent, child who recently had surgery, acute and chronic medical issues.  Protective factors include stable domicile, social support, no past psych hosp or SA, no substance use.  Pt does not present an acute risk of harm to self or others.  Pt does not require inpatient psychiatric hospitalization. Risk factors include single parent, child who recently had surgery, acute and chronic medical issues.  Protective factors include stable domicile, social support, no past psych hosp or SA, no substance use.  Pt does not present an acute risk of harm to self or others.  Pt does not require inpatient psychiatric hospitalization.

## 2018-04-12 DIAGNOSIS — E87.1 HYPO-OSMOLALITY AND HYPONATREMIA: ICD-10-CM

## 2018-04-12 DIAGNOSIS — Z79.84 LONG TERM (CURRENT) USE OF ORAL HYPOGLYCEMIC DRUGS: ICD-10-CM

## 2018-04-12 DIAGNOSIS — E11.622 TYPE 2 DIABETES MELLITUS WITH OTHER SKIN ULCER: ICD-10-CM

## 2018-04-12 DIAGNOSIS — I83.023 VARICOSE VEINS OF LEFT LOWER EXTREMITY WITH ULCER OF ANKLE: ICD-10-CM

## 2018-04-12 DIAGNOSIS — E78.5 HYPERLIPIDEMIA, UNSPECIFIED: ICD-10-CM

## 2018-04-12 DIAGNOSIS — L97.321 NON-PRESSURE CHRONIC ULCER OF LEFT ANKLE LIMITED TO BREAKDOWN OF SKIN: ICD-10-CM

## 2018-04-12 DIAGNOSIS — I10 ESSENTIAL (PRIMARY) HYPERTENSION: ICD-10-CM

## 2018-04-12 DIAGNOSIS — B96.5 PSEUDOMONAS (AERUGINOSA) (MALLEI) (PSEUDOMALLEI) AS THE CAUSE OF DISEASES CLASSIFIED ELSEWHERE: ICD-10-CM

## 2018-04-12 DIAGNOSIS — E11.9 TYPE 2 DIABETES MELLITUS WITHOUT COMPLICATIONS: ICD-10-CM

## 2018-04-12 DIAGNOSIS — E87.5 HYPERKALEMIA: ICD-10-CM

## 2018-04-20 ENCOUNTER — APPOINTMENT (OUTPATIENT)
Dept: VASCULAR SURGERY | Facility: CLINIC | Age: 68
End: 2018-04-20
Payer: COMMERCIAL

## 2018-04-20 PROCEDURE — 99213 OFFICE O/P EST LOW 20 MIN: CPT

## 2018-04-20 RX ORDER — SILVER SULFADIAZINE 10 MG/G
1 CREAM TOPICAL DAILY
Qty: 30 | Refills: 1 | Status: ACTIVE | COMMUNITY
Start: 2018-04-20 | End: 1900-01-01

## 2018-04-20 RX ORDER — OXYCODONE AND ACETAMINOPHEN 5; 325 MG/1; MG/1
5-325 TABLET ORAL
Qty: 30 | Refills: 0 | Status: DISCONTINUED | COMMUNITY
Start: 2018-03-16 | End: 2018-04-20

## 2018-04-23 ENCOUNTER — APPOINTMENT (OUTPATIENT)
Dept: VASCULAR SURGERY | Facility: CLINIC | Age: 68
End: 2018-04-23

## 2018-04-23 ENCOUNTER — INPATIENT (INPATIENT)
Facility: HOSPITAL | Age: 68
LOS: 8 days | Discharge: NURSING, REHAB OR LONG-TERM CARE | DRG: 623 | End: 2018-05-02
Attending: SURGERY | Admitting: SURGERY
Payer: COMMERCIAL

## 2018-04-23 VITALS
DIASTOLIC BLOOD PRESSURE: 74 MMHG | WEIGHT: 172.4 LBS | TEMPERATURE: 98 F | OXYGEN SATURATION: 99 % | RESPIRATION RATE: 18 BRPM | HEART RATE: 62 BPM | SYSTOLIC BLOOD PRESSURE: 126 MMHG

## 2018-04-23 LAB
ALBUMIN SERPL ELPH-MCNC: 4.8 G/DL — SIGNIFICANT CHANGE UP (ref 3.3–5)
ALP SERPL-CCNC: 97 U/L — SIGNIFICANT CHANGE UP (ref 40–120)
ALT FLD-CCNC: 20 U/L — SIGNIFICANT CHANGE UP (ref 10–45)
ANION GAP SERPL CALC-SCNC: 11 MMOL/L — SIGNIFICANT CHANGE UP (ref 5–17)
APTT BLD: 34.8 SEC — SIGNIFICANT CHANGE UP (ref 27.5–37.4)
AST SERPL-CCNC: 28 U/L — SIGNIFICANT CHANGE UP (ref 10–40)
BASOPHILS NFR BLD AUTO: 0.2 % — SIGNIFICANT CHANGE UP (ref 0–2)
BILIRUB SERPL-MCNC: 0.4 MG/DL — SIGNIFICANT CHANGE UP (ref 0.2–1.2)
BLD GP AB SCN SERPL QL: NEGATIVE — SIGNIFICANT CHANGE UP
BLD GP AB SCN SERPL QL: NEGATIVE — SIGNIFICANT CHANGE UP
BUN SERPL-MCNC: 23 MG/DL — SIGNIFICANT CHANGE UP (ref 7–23)
CALCIUM SERPL-MCNC: 10.2 MG/DL — SIGNIFICANT CHANGE UP (ref 8.4–10.5)
CHLORIDE SERPL-SCNC: 98 MMOL/L — SIGNIFICANT CHANGE UP (ref 96–108)
CO2 SERPL-SCNC: 27 MMOL/L — SIGNIFICANT CHANGE UP (ref 22–31)
CREAT SERPL-MCNC: 0.88 MG/DL — SIGNIFICANT CHANGE UP (ref 0.5–1.3)
EOSINOPHIL NFR BLD AUTO: 1.2 % — SIGNIFICANT CHANGE UP (ref 0–6)
GLUCOSE BLDC GLUCOMTR-MCNC: 218 MG/DL — HIGH (ref 70–99)
GLUCOSE BLDC GLUCOMTR-MCNC: 261 MG/DL — HIGH (ref 70–99)
GLUCOSE SERPL-MCNC: 236 MG/DL — HIGH (ref 70–99)
HCT VFR BLD CALC: 34.6 % — SIGNIFICANT CHANGE UP (ref 34.5–45)
HGB BLD-MCNC: 11 G/DL — LOW (ref 11.5–15.5)
INR BLD: 1 — SIGNIFICANT CHANGE UP (ref 0.88–1.16)
LYMPHOCYTES # BLD AUTO: 44.6 % — HIGH (ref 13–44)
MCHC RBC-ENTMCNC: 27.9 PG — SIGNIFICANT CHANGE UP (ref 27–34)
MCHC RBC-ENTMCNC: 31.8 G/DL — LOW (ref 32–36)
MCV RBC AUTO: 87.8 FL — SIGNIFICANT CHANGE UP (ref 80–100)
MONOCYTES NFR BLD AUTO: 7.8 % — SIGNIFICANT CHANGE UP (ref 2–14)
NEUTROPHILS NFR BLD AUTO: 46.2 % — SIGNIFICANT CHANGE UP (ref 43–77)
PLATELET # BLD AUTO: 242 K/UL — SIGNIFICANT CHANGE UP (ref 150–400)
POTASSIUM SERPL-MCNC: 4.6 MMOL/L — SIGNIFICANT CHANGE UP (ref 3.5–5.3)
POTASSIUM SERPL-SCNC: 4.6 MMOL/L — SIGNIFICANT CHANGE UP (ref 3.5–5.3)
PROT SERPL-MCNC: 8.1 G/DL — SIGNIFICANT CHANGE UP (ref 6–8.3)
PROTHROM AB SERPL-ACNC: 11.1 SEC — SIGNIFICANT CHANGE UP (ref 9.8–12.7)
RBC # BLD: 3.94 M/UL — SIGNIFICANT CHANGE UP (ref 3.8–5.2)
RBC # FLD: 15.5 % — SIGNIFICANT CHANGE UP (ref 10.3–16.9)
RH IG SCN BLD-IMP: POSITIVE — SIGNIFICANT CHANGE UP
RH IG SCN BLD-IMP: POSITIVE — SIGNIFICANT CHANGE UP
SODIUM SERPL-SCNC: 136 MMOL/L — SIGNIFICANT CHANGE UP (ref 135–145)
WBC # BLD: 4.1 K/UL — SIGNIFICANT CHANGE UP (ref 3.8–10.5)
WBC # FLD AUTO: 4.1 K/UL — SIGNIFICANT CHANGE UP (ref 3.8–10.5)

## 2018-04-23 PROCEDURE — 93010 ELECTROCARDIOGRAM REPORT: CPT | Mod: NC

## 2018-04-23 PROCEDURE — 99285 EMERGENCY DEPT VISIT HI MDM: CPT | Mod: 25

## 2018-04-23 PROCEDURE — 71046 X-RAY EXAM CHEST 2 VIEWS: CPT | Mod: 26

## 2018-04-23 PROCEDURE — 99233 SBSQ HOSP IP/OBS HIGH 50: CPT | Mod: 57,GC

## 2018-04-23 RX ORDER — DEXTROSE 50 % IN WATER 50 %
12.5 SYRINGE (ML) INTRAVENOUS ONCE
Qty: 0 | Refills: 0 | Status: DISCONTINUED | OUTPATIENT
Start: 2018-04-23 | End: 2018-05-02

## 2018-04-23 RX ORDER — OXYCODONE HYDROCHLORIDE 5 MG/1
5 TABLET ORAL EVERY 6 HOURS
Qty: 0 | Refills: 0 | Status: DISCONTINUED | OUTPATIENT
Start: 2018-04-23 | End: 2018-04-25

## 2018-04-23 RX ORDER — DEXTROSE 50 % IN WATER 50 %
25 SYRINGE (ML) INTRAVENOUS ONCE
Qty: 0 | Refills: 0 | Status: DISCONTINUED | OUTPATIENT
Start: 2018-04-23 | End: 2018-05-02

## 2018-04-23 RX ORDER — SIMVASTATIN 20 MG/1
40 TABLET, FILM COATED ORAL AT BEDTIME
Qty: 0 | Refills: 0 | Status: DISCONTINUED | OUTPATIENT
Start: 2018-04-23 | End: 2018-05-02

## 2018-04-23 RX ORDER — DEXTROSE 50 % IN WATER 50 %
1 SYRINGE (ML) INTRAVENOUS ONCE
Qty: 0 | Refills: 0 | Status: DISCONTINUED | OUTPATIENT
Start: 2018-04-23 | End: 2018-05-02

## 2018-04-23 RX ORDER — ACETAMINOPHEN 500 MG
650 TABLET ORAL ONCE
Qty: 0 | Refills: 0 | Status: COMPLETED | OUTPATIENT
Start: 2018-04-23 | End: 2018-04-23

## 2018-04-23 RX ORDER — SENNA PLUS 8.6 MG/1
2 TABLET ORAL AT BEDTIME
Qty: 0 | Refills: 0 | Status: DISCONTINUED | OUTPATIENT
Start: 2018-04-23 | End: 2018-05-02

## 2018-04-23 RX ORDER — INSULIN LISPRO 100/ML
VIAL (ML) SUBCUTANEOUS
Qty: 0 | Refills: 0 | Status: DISCONTINUED | OUTPATIENT
Start: 2018-04-23 | End: 2018-04-23

## 2018-04-23 RX ORDER — HEPARIN SODIUM 5000 [USP'U]/ML
5000 INJECTION INTRAVENOUS; SUBCUTANEOUS EVERY 8 HOURS
Qty: 0 | Refills: 0 | Status: DISCONTINUED | OUTPATIENT
Start: 2018-04-23 | End: 2018-04-24

## 2018-04-23 RX ORDER — OXYCODONE AND ACETAMINOPHEN 5; 325 MG/1; MG/1
1 TABLET ORAL EVERY 4 HOURS
Qty: 0 | Refills: 0 | Status: DISCONTINUED | OUTPATIENT
Start: 2018-04-23 | End: 2018-04-23

## 2018-04-23 RX ORDER — GABAPENTIN 400 MG/1
300 CAPSULE ORAL THREE TIMES A DAY
Qty: 0 | Refills: 0 | Status: DISCONTINUED | OUTPATIENT
Start: 2018-04-23 | End: 2018-04-28

## 2018-04-23 RX ORDER — PIPERACILLIN AND TAZOBACTAM 4; .5 G/20ML; G/20ML
4.5 INJECTION, POWDER, LYOPHILIZED, FOR SOLUTION INTRAVENOUS EVERY 6 HOURS
Qty: 0 | Refills: 0 | Status: DISCONTINUED | OUTPATIENT
Start: 2018-04-23 | End: 2018-04-24

## 2018-04-23 RX ORDER — LOSARTAN POTASSIUM 100 MG/1
100 TABLET, FILM COATED ORAL DAILY
Qty: 0 | Refills: 0 | Status: DISCONTINUED | OUTPATIENT
Start: 2018-04-23 | End: 2018-04-23

## 2018-04-23 RX ORDER — GLUCAGON INJECTION, SOLUTION 0.5 MG/.1ML
1 INJECTION, SOLUTION SUBCUTANEOUS ONCE
Qty: 0 | Refills: 0 | Status: DISCONTINUED | OUTPATIENT
Start: 2018-04-23 | End: 2018-05-02

## 2018-04-23 RX ORDER — ASPIRIN/CALCIUM CARB/MAGNESIUM 324 MG
81 TABLET ORAL DAILY
Qty: 0 | Refills: 0 | Status: DISCONTINUED | OUTPATIENT
Start: 2018-04-23 | End: 2018-05-02

## 2018-04-23 RX ORDER — OXYCODONE AND ACETAMINOPHEN 5; 325 MG/1; MG/1
2 TABLET ORAL EVERY 4 HOURS
Qty: 0 | Refills: 0 | Status: DISCONTINUED | OUTPATIENT
Start: 2018-04-23 | End: 2018-04-23

## 2018-04-23 RX ORDER — SODIUM CHLORIDE 9 MG/ML
1000 INJECTION, SOLUTION INTRAVENOUS
Qty: 0 | Refills: 0 | Status: DISCONTINUED | OUTPATIENT
Start: 2018-04-23 | End: 2018-05-02

## 2018-04-23 RX ORDER — INSULIN LISPRO 100/ML
VIAL (ML) SUBCUTANEOUS
Qty: 0 | Refills: 0 | Status: DISCONTINUED | OUTPATIENT
Start: 2018-04-23 | End: 2018-05-02

## 2018-04-23 RX ORDER — SODIUM CHLORIDE 9 MG/ML
1000 INJECTION INTRAMUSCULAR; INTRAVENOUS; SUBCUTANEOUS
Qty: 0 | Refills: 0 | Status: DISCONTINUED | OUTPATIENT
Start: 2018-04-23 | End: 2018-04-24

## 2018-04-23 RX ADMIN — Medication 6: at 16:54

## 2018-04-23 RX ADMIN — Medication 650 MILLIGRAM(S): at 23:30

## 2018-04-23 RX ADMIN — OXYCODONE HYDROCHLORIDE 5 MILLIGRAM(S): 5 TABLET ORAL at 18:17

## 2018-04-23 RX ADMIN — GABAPENTIN 300 MILLIGRAM(S): 400 CAPSULE ORAL at 21:44

## 2018-04-23 RX ADMIN — HEPARIN SODIUM 5000 UNIT(S): 5000 INJECTION INTRAVENOUS; SUBCUTANEOUS at 21:43

## 2018-04-23 RX ADMIN — GABAPENTIN 300 MILLIGRAM(S): 400 CAPSULE ORAL at 13:24

## 2018-04-23 RX ADMIN — OXYCODONE HYDROCHLORIDE 5 MILLIGRAM(S): 5 TABLET ORAL at 01:00

## 2018-04-23 RX ADMIN — Medication 81 MILLIGRAM(S): at 18:17

## 2018-04-23 RX ADMIN — Medication 4: at 21:43

## 2018-04-23 RX ADMIN — PIPERACILLIN AND TAZOBACTAM 200 GRAM(S): 4; .5 INJECTION, POWDER, LYOPHILIZED, FOR SOLUTION INTRAVENOUS at 23:30

## 2018-04-23 RX ADMIN — HEPARIN SODIUM 5000 UNIT(S): 5000 INJECTION INTRAVENOUS; SUBCUTANEOUS at 13:24

## 2018-04-23 RX ADMIN — SIMVASTATIN 40 MILLIGRAM(S): 20 TABLET, FILM COATED ORAL at 21:44

## 2018-04-23 RX ADMIN — SENNA PLUS 2 TABLET(S): 8.6 TABLET ORAL at 21:44

## 2018-04-23 RX ADMIN — OXYCODONE HYDROCHLORIDE 5 MILLIGRAM(S): 5 TABLET ORAL at 13:24

## 2018-04-23 NOTE — ED PROVIDER NOTE - OBJECTIVE STATEMENT
pt with Hx DM, anemia with infected varicose vein and left ankle ulcer that began in late February; March 23-Apr 5 admission for IV abx; wound slow to heal and to be admitted for surgical debridement tomorrow in OR.  Has moderate pain in ankle, hurts to walk.  No fever or chills, chest pain or dyspnea.

## 2018-04-23 NOTE — PROGRESS NOTE ADULT - SUBJECTIVE AND OBJECTIVE BOX
PRE OPERATIVE NOTE    Pre-op Diagnosis: LLE nonhealing ulcer  Procedure: LLE wound debridement  Surgeon: Irene    Consent in chart                          11.0   4.1   )-----------( 242      ( 23 Apr 2018 10:53 )             34.6     04-23    136  |  98  |  23  ----------------------------<  236<H>  4.6   |  27  |  0.88    Ca    10.2      23 Apr 2018 10:53    TPro  8.1  /  Alb  4.8  /  TBili  0.4  /  DBili  x   /  AST  28  /  ALT  20  /  AlkPhos  97  04-23    PT/INR - ( 23 Apr 2018 10:53 )   PT: 11.1 sec;   INR: 1.00          PTT - ( 23 Apr 2018 10:53 )  PTT:34.8 sec      Type & Screen: O+  CXR: done  EKG: sinus bradycardia        A/P: 68yFemale planned for above procedure  1. NPO past midnight, except medications  2. IVF  3. [x] Blood on hold, Units: 1  4. f/u am labs

## 2018-04-23 NOTE — PROGRESS NOTE ADULT - SUBJECTIVE AND OBJECTIVE BOX
Pre-op Diagnosis: Non healing LLE wound  Procedure: LLE wound debridement  Surgeon: Dr. Bonilla    Consent in chart                          11.0   4.1   )-----------( 242      ( 23 Apr 2018 10:53 )             34.6     04-23    136  |  98  |  23  ----------------------------<  236<H>  4.6   |  27  |  0.88    Ca    10.2      23 Apr 2018 10:53    TPro  8.1  /  Alb  4.8  /  TBili  0.4  /  DBili  x   /  AST  28  /  ALT  20  /  AlkPhos  97  04-23    PT/INR - ( 23 Apr 2018 10:53 )   PT: 11.1 sec;   INR: 1.00     PTT - ( 23 Apr 2018 10:53 )  PTT:34.8 sec      Type & Screen: O+ Ab Neg  CXR:  EKG:      A/P: 68yFemale planned for above procedure  1. NPO past midnight, except medications  2. NS@60ml/hr  3. Home medication  4. Zosyn  5. F/u AM labs Pre-op Diagnosis: Non healing LLE wound  Procedure: LLE wound debridement  Surgeon: Dr. Bonilla    Consent in chart                          11.0   4.1   )-----------( 242      ( 23 Apr 2018 10:53 )             34.6     04-23    136  |  98  |  23  ----------------------------<  236<H>  4.6   |  27  |  0.88    Ca    10.2      23 Apr 2018 10:53    TPro  8.1  /  Alb  4.8  /  TBili  0.4  /  DBili  x   /  AST  28  /  ALT  20  /  AlkPhos  97  04-23    PT/INR - ( 23 Apr 2018 10:53 )   PT: 11.1 sec;   INR: 1.00     PTT - ( 23 Apr 2018 10:53 )  PTT:34.8 sec      Type & Screen: O+ Ab Neg  CXR: Pending read  EKG: NSR@51bpm      A/P: 68yFemale planned for above procedure  1. NPO past midnight, except medications  2. NS@60ml/hr  3. Home medication  4. Zosyn  5. F/u AM labs

## 2018-04-23 NOTE — ED ADULT TRIAGE NOTE - CHIEF COMPLAINT QUOTE
patient complains of left foot pain. sent for admission by Dr. Bonilla for OR. denies fever, chills.

## 2018-04-23 NOTE — ED PROVIDER NOTE - MEDICAL DECISION MAKING DETAILS
Nonhealing stasis ulcer left ankle.  For OR debridement.  No signs of acute infection.  Check labs, admission EKG and CXR.  Vasc consult called.

## 2018-04-23 NOTE — ED PROVIDER NOTE - PHYSICAL EXAMINATION
CONSTITUTIONAL: WD,WN. NAD.    SKIN: Normal color and turgor. No rash.    HEAD: NC/AT.  EYES: Conjunctiva clear. EOMI. PERRL.    ENT: Airway patent, OP without erythema, tonsillar swelling or exudate; uvula midline without swelling. Nasal mucosa clear, no rhinorrhea.   RESPIRATORY:  Breathing non-labored. No retractions or accessory muscle use.  Lungs CTA bilat.  CARDIOVASCULAR:  RRR, S1S2. No M/R/G.      GI:  Abdomen soft, nontender.    MSK: 2 x 5 cm shallow ulcer lateral aspect left ankle.  Surrounding venous stasis changes.  +2 DP, barely palpable PT.  Foot and toes WWP.    NEURO: Alert and oriented; CN II-XII grossly intact. Speech clear. 5/5 strength in all extremities.  Normal balance and gait.

## 2018-04-23 NOTE — ED PROVIDER NOTE - ATTENDING CONTRIBUTION TO CARE
68 DM sent from [facility] for admission and OR debridement tomorrow for nonhealing noninfected ankle ulcer.  Pt well, VSS, good pulses, no discharge, redness or warmth.  Discussed with [facility] and rec admit for OR tx.

## 2018-04-23 NOTE — ED ADULT NURSE NOTE - OBJECTIVE STATEMENT
Pt presents to ED c/o L ankle pain. Pt with ulcer to L lateral ankle since end of February per pt, trialed on PO abx without resolution presents today for admission for OR tomorrow. Pt endorses 5/10 ankle pain at this time, up to 10/10 with walking per pt, denies fevers/chills. Sensation to L foot diminished though baseline per pt, no numbness/tingling. Pt presents in NAD speaking full sentences ambulatory through triage.

## 2018-04-23 NOTE — H&P ADULT - HISTORY OF PRESENT ILLNESS
68y female with PMH HTN, HLD, DM with hx of non-healing LLE wound since March that began as a bleeding varicose vein. She was previously treated with a course of oral abx (Augmentin) and Silvadene; then a course of Bactrim; then she was recently admitted to [facility] and treated with IV Zosyn, DCed with Cipro - cultures grew pseudomonas. Since the formation of the ulceration, the patient has been complaining of severe/persistent pain that radiates from the wound up and down her leg - the pain is so severe it prevents her from sleeping at night, she was discharged home with Oxycontin for pain control.     Today she presents from Dr. Mcclellan office on routine follow up from the hospital admission c/o with peristent LLE wound pain - she is afebrile, no leukocytosis (4.1 on admission), /62.      PAST MEDICAL & SURGICAL HISTORY:  Varicose vein of leg  Type 2 diabetes mellitus without complication, without long-term current use of insulin  Essential hypertension  Hyperlipidemia, unspecified hyperlipidemia type  No significant past surgical history      ROS: See HPI    MEDICATIONS  (STANDING):  dextrose 5%. 1000 milliLiter(s) (50 mL/Hr) IV Continuous <Continuous>  dextrose 50% Injectable 12.5 Gram(s) IV Push once  dextrose 50% Injectable 25 Gram(s) IV Push once  dextrose 50% Injectable 25 Gram(s) IV Push once  heparin  Injectable 5000 Unit(s) SubCutaneous every 8 hours  insulin lispro (HumaLOG) corrective regimen sliding scale   SubCutaneous Before meals and at bedtime  piperacillin/tazobactam IVPB. 4.5 Gram(s) IV Intermittent every 6 hours    MEDICATIONS  (PRN):  dextrose Gel 1 Dose(s) Oral once PRN Blood Glucose LESS THAN 70 milliGRAM(s)/deciliter  glucagon  Injectable 1 milliGRAM(s) IntraMuscular once PRN Glucose LESS THAN 70 milligrams/deciliter  oxyCODONE    IR 5 milliGRAM(s) Oral every 6 hours PRN Severe Pain (7 - 10)      Allergies    No Known Allergies    Intolerances    codeine (Vomiting)      SOCIAL HISTORY:  Smoke: Never Smoker  EtOH: occasional    FAMILY HISTORY:  No pertinent family history in first degree relatives      Vital Signs Last 24 Hrs  T(C): 36.4 (23 Apr 2018 12:05), Max: 36.7 (23 Apr 2018 10:07)  T(F): 97.5 (23 Apr 2018 12:05), Max: 98 (23 Apr 2018 10:07)  HR: 56 (23 Apr 2018 12:05) (56 - 62)  BP: 123/78 (23 Apr 2018 12:05) (123/78 - 126/74)  BP(mean): --  RR: 18 (23 Apr 2018 12:05) (18 - 18)  SpO2: 100% (23 Apr 2018 12:05) (99% - 100%)    PHYSICAL EXAM  Exam:   Neuro: AOX3, calm, NAD.   Gen: WD, WN, appropriately groomed.    HEENT: AT, NC  Neck: No JVD, Normal thyroid, NO carotid bruits bilaterally.   Res: Nml BS, CTAB, no wheezes/rhales or rhonchi.   CV: Normal HS, RRR, no MRG  Abd: soft, ND, NT, no masses or organomegaly appreciated  Musculoskeletal: sensation grossly intact, strength 5/5, FROM bilaterally   Pulses:                     R:      L:   Femoral: 2+ / 2+  Popliteal: 2+ / 2+  Post Tib: triphasic bilaterally   D. Pedis 2+ / 2+  Ankle Edema: No  Venous Stasis: No, right or left, mild/mod/severe  Wounds: left lateral malleolar ulceration 2x0.5cm, healing with scab, no purulence or drainage, minimal surrounding erythema/no significant cellulitic changes.       LABS:                        11.0   4.1   )-----------( 242      ( 23 Apr 2018 10:53 )             34.6     04-23    136  |  98  |  23  ----------------------------<  236<H>  4.6   |  27  |  0.88    Ca    10.2      23 Apr 2018 10:53    TPro  8.1  /  Alb  4.8  /  TBili  0.4  /  DBili  x   /  AST  28  /  ALT  20  /  AlkPhos  97  04-23    PT/INR - ( 23 Apr 2018 10:53 )   PT: 11.1 sec;   INR: 1.00          PTT - ( 23 Apr 2018 10:53 )  PTT:34.8 sec        Assessment and Plan:  68yFemale with chronic LLE lateral wound with persistent pain presents for surgical debridement and IV abx after multiple failed courses of PO antibiotics and previous hospital admission requiring IV Zosyn. She presents appearing non-toxic with no signs or symptoms of active infection, afebrile, HD stable.      Admit to 05[facility].  - DM diet / NPO at midnight for OR Tuesday 4/24 for LLE lateral wound debridement   - IV abx - previous cultures growing pseudomonas sensitive to Zosyn.  - Repeat wound cultures in OR  - continue home medications   - ISS  - Pre-op lab and radiology (CBC, BMP, coags, T+S, EKG, CXR)  - DVT ppx   - AM labs   - Daily wound care: wet to dry 68y female with PMH HTN, HLD, DM with hx of non-healing LLE wound since March that began as a bleeding varicose vein. She was previously treated with a course of oral abx (Augmentin) and Silvadene; then a course of Bactrim; then she was recently admitted to [facility] (March 23, DCed April 5) and treated with IV Zosyn, DCed with Cipro - cultures grew pseudomonas (ID - Maslak). Since the formation of the ulceration, the patient has been complaining of severe/persistent pain that radiates from the wound up and down her leg - the pain is so severe it prevents her from sleeping at night, she was discharged home with Oxycontin for pain control.     Today she presents from Dr. Mcclellan office on routine follow up from the hospital admission c/o with peristent LLE wound pain - she is afebrile, no leukocytosis (4.1 on admission), /62.      PAST MEDICAL & SURGICAL HISTORY:  Varicose vein of leg  Type 2 diabetes mellitus without complication, without long-term current use of insulin  Essential hypertension  Hyperlipidemia, unspecified hyperlipidemia type  No significant past surgical history      ROS: See HPI    MEDICATIONS  (STANDING):  dextrose 5%. 1000 milliLiter(s) (50 mL/Hr) IV Continuous <Continuous>  dextrose 50% Injectable 12.5 Gram(s) IV Push once  dextrose 50% Injectable 25 Gram(s) IV Push once  dextrose 50% Injectable 25 Gram(s) IV Push once  heparin  Injectable 5000 Unit(s) SubCutaneous every 8 hours  insulin lispro (HumaLOG) corrective regimen sliding scale   SubCutaneous Before meals and at bedtime  piperacillin/tazobactam IVPB. 4.5 Gram(s) IV Intermittent every 6 hours    MEDICATIONS  (PRN):  dextrose Gel 1 Dose(s) Oral once PRN Blood Glucose LESS THAN 70 milliGRAM(s)/deciliter  glucagon  Injectable 1 milliGRAM(s) IntraMuscular once PRN Glucose LESS THAN 70 milligrams/deciliter  oxyCODONE    IR 5 milliGRAM(s) Oral every 6 hours PRN Severe Pain (7 - 10)      Allergies    No Known Allergies    Intolerances    codeine (Vomiting)      SOCIAL HISTORY:  Smoke: Never Smoker  EtOH: occasional    FAMILY HISTORY:  No pertinent family history in first degree relatives      Vital Signs Last 24 Hrs  T(C): 36.4 (23 Apr 2018 12:05), Max: 36.7 (23 Apr 2018 10:07)  T(F): 97.5 (23 Apr 2018 12:05), Max: 98 (23 Apr 2018 10:07)  HR: 56 (23 Apr 2018 12:05) (56 - 62)  BP: 123/78 (23 Apr 2018 12:05) (123/78 - 126/74)  BP(mean): --  RR: 18 (23 Apr 2018 12:05) (18 - 18)  SpO2: 100% (23 Apr 2018 12:05) (99% - 100%)    PHYSICAL EXAM  Exam:   Neuro: AOX3, calm, NAD.   Gen: WD, WN, appropriately groomed.    HEENT: AT, NC  Neck: No JVD, Normal thyroid, NO carotid bruits bilaterally.   Res: Nml BS, CTAB, no wheezes/rhales or rhonchi.   CV: Normal HS, RRR, no MRG  Abd: soft, ND, NT, no masses or organomegaly appreciated  Musculoskeletal: sensation grossly intact, strength 5/5, FROM bilaterally   Pulses:                     R:      L:   Femoral: 2+ / 2+  Popliteal: 2+ / 2+  Post Tib: triphasic bilaterally   D. Pedis 2+ / 2+  Ankle Edema: No  Venous Stasis: No, right or left, mild/mod/severe  Wounds: left lateral malleolar ulceration 2x0.5cm, healing with scab, no purulence or drainage, minimal surrounding erythema/no significant cellulitic changes.       LABS:                        11.0   4.1   )-----------( 242      ( 23 Apr 2018 10:53 )             34.6     04-23    136  |  98  |  23  ----------------------------<  236<H>  4.6   |  27  |  0.88    Ca    10.2      23 Apr 2018 10:53    TPro  8.1  /  Alb  4.8  /  TBili  0.4  /  DBili  x   /  AST  28  /  ALT  20  /  AlkPhos  97  04-23    PT/INR - ( 23 Apr 2018 10:53 )   PT: 11.1 sec;   INR: 1.00          PTT - ( 23 Apr 2018 10:53 )  PTT:34.8 sec        Assessment and Plan:  68yFemale with chronic LLE lateral wound with persistent pain presents for surgical debridement and IV abx after multiple failed courses of PO antibiotics and previous hospital admission requiring IV Zosyn. She presents appearing non-toxic with no signs or symptoms of active infection, afebrile, HD stable.      Admit to [facility].  - DM diet / NPO at midnight for OR Tuesday 4/24 for LLE lateral wound debridement   - IV abx - previous cultures growing pseudomonas sensitive to Zosyn.  - Repeat wound cultures in OR  - continue home medications   - ISS  - Pre-op lab and radiology (CBC, BMP, coags, T+S, EKG, CXR)  - DVT ppx   - AM labs   - Daily wound care: wet to dry

## 2018-04-24 ENCOUNTER — RESULT REVIEW (OUTPATIENT)
Age: 68
End: 2018-04-24

## 2018-04-24 LAB
ANION GAP SERPL CALC-SCNC: 13 MMOL/L — SIGNIFICANT CHANGE UP (ref 5–17)
BLD GP AB SCN SERPL QL: NEGATIVE — SIGNIFICANT CHANGE UP
BUN SERPL-MCNC: 16 MG/DL — SIGNIFICANT CHANGE UP (ref 7–23)
CALCIUM SERPL-MCNC: 9.6 MG/DL — SIGNIFICANT CHANGE UP (ref 8.4–10.5)
CHLORIDE SERPL-SCNC: 106 MMOL/L — SIGNIFICANT CHANGE UP (ref 96–108)
CO2 SERPL-SCNC: 24 MMOL/L — SIGNIFICANT CHANGE UP (ref 22–31)
CREAT SERPL-MCNC: 0.9 MG/DL — SIGNIFICANT CHANGE UP (ref 0.5–1.3)
GLUCOSE BLDC GLUCOMTR-MCNC: 126 MG/DL — HIGH (ref 70–99)
GLUCOSE BLDC GLUCOMTR-MCNC: 168 MG/DL — HIGH (ref 70–99)
GLUCOSE BLDC GLUCOMTR-MCNC: 218 MG/DL — HIGH (ref 70–99)
GLUCOSE BLDC GLUCOMTR-MCNC: 234 MG/DL — HIGH (ref 70–99)
GLUCOSE BLDC GLUCOMTR-MCNC: 99 MG/DL — SIGNIFICANT CHANGE UP (ref 70–99)
GLUCOSE SERPL-MCNC: 104 MG/DL — HIGH (ref 70–99)
HCT VFR BLD CALC: 34.6 % — SIGNIFICANT CHANGE UP (ref 34.5–45)
HGB BLD-MCNC: 10.6 G/DL — LOW (ref 11.5–15.5)
MAGNESIUM SERPL-MCNC: 2 MG/DL — SIGNIFICANT CHANGE UP (ref 1.6–2.6)
MCHC RBC-ENTMCNC: 28 PG — SIGNIFICANT CHANGE UP (ref 27–34)
MCHC RBC-ENTMCNC: 30.6 G/DL — LOW (ref 32–36)
MCV RBC AUTO: 91.5 FL — SIGNIFICANT CHANGE UP (ref 80–100)
PHOSPHATE SERPL-MCNC: 3.6 MG/DL — SIGNIFICANT CHANGE UP (ref 2.5–4.5)
PLATELET # BLD AUTO: 201 K/UL — SIGNIFICANT CHANGE UP (ref 150–400)
POTASSIUM SERPL-MCNC: 4.1 MMOL/L — SIGNIFICANT CHANGE UP (ref 3.5–5.3)
POTASSIUM SERPL-SCNC: 4.1 MMOL/L — SIGNIFICANT CHANGE UP (ref 3.5–5.3)
RBC # BLD: 3.78 M/UL — LOW (ref 3.8–5.2)
RBC # FLD: 15.3 % — SIGNIFICANT CHANGE UP (ref 10.3–16.9)
RH IG SCN BLD-IMP: POSITIVE — SIGNIFICANT CHANGE UP
SODIUM SERPL-SCNC: 143 MMOL/L — SIGNIFICANT CHANGE UP (ref 135–145)
WBC # BLD: 3 K/UL — LOW (ref 3.8–10.5)
WBC # FLD AUTO: 3 K/UL — LOW (ref 3.8–10.5)

## 2018-04-24 PROCEDURE — 11043 DBRDMT MUSC&/FSCA 1ST 20/<: CPT | Mod: GC

## 2018-04-24 PROCEDURE — 97605 NEG PRS WND THER DME<=50SQCM: CPT | Mod: GC

## 2018-04-24 RX ORDER — ASPIRIN/CALCIUM CARB/MAGNESIUM 324 MG
81 TABLET ORAL DAILY
Qty: 0 | Refills: 0 | Status: DISCONTINUED | OUTPATIENT
Start: 2018-04-24 | End: 2018-04-24

## 2018-04-24 RX ORDER — LOSARTAN POTASSIUM 100 MG/1
100 TABLET, FILM COATED ORAL DAILY
Qty: 0 | Refills: 0 | Status: DISCONTINUED | OUTPATIENT
Start: 2018-04-24 | End: 2018-04-24

## 2018-04-24 RX ORDER — PIPERACILLIN AND TAZOBACTAM 4; .5 G/20ML; G/20ML
4.5 INJECTION, POWDER, LYOPHILIZED, FOR SOLUTION INTRAVENOUS EVERY 6 HOURS
Qty: 0 | Refills: 0 | Status: DISCONTINUED | OUTPATIENT
Start: 2018-04-24 | End: 2018-04-27

## 2018-04-24 RX ORDER — HYDROMORPHONE HYDROCHLORIDE 2 MG/ML
1 INJECTION INTRAMUSCULAR; INTRAVENOUS; SUBCUTANEOUS EVERY 4 HOURS
Qty: 0 | Refills: 0 | Status: DISCONTINUED | OUTPATIENT
Start: 2018-04-24 | End: 2018-04-25

## 2018-04-24 RX ORDER — LOSARTAN POTASSIUM 100 MG/1
100 TABLET, FILM COATED ORAL DAILY
Qty: 0 | Refills: 0 | Status: DISCONTINUED | OUTPATIENT
Start: 2018-04-25 | End: 2018-05-02

## 2018-04-24 RX ORDER — PIPERACILLIN AND TAZOBACTAM 4; .5 G/20ML; G/20ML
4.5 INJECTION, POWDER, LYOPHILIZED, FOR SOLUTION INTRAVENOUS EVERY 6 HOURS
Qty: 0 | Refills: 0 | Status: DISCONTINUED | OUTPATIENT
Start: 2018-04-24 | End: 2018-04-24

## 2018-04-24 RX ORDER — DOCUSATE SODIUM 100 MG
100 CAPSULE ORAL THREE TIMES A DAY
Qty: 0 | Refills: 0 | Status: DISCONTINUED | OUTPATIENT
Start: 2018-04-24 | End: 2018-05-02

## 2018-04-24 RX ORDER — HEPARIN SODIUM 5000 [USP'U]/ML
5000 INJECTION INTRAVENOUS; SUBCUTANEOUS EVERY 8 HOURS
Qty: 0 | Refills: 0 | Status: DISCONTINUED | OUTPATIENT
Start: 2018-04-24 | End: 2018-05-02

## 2018-04-24 RX ORDER — HYDROMORPHONE HYDROCHLORIDE 2 MG/ML
0.5 INJECTION INTRAMUSCULAR; INTRAVENOUS; SUBCUTANEOUS EVERY 4 HOURS
Qty: 0 | Refills: 0 | Status: DISCONTINUED | OUTPATIENT
Start: 2018-04-24 | End: 2018-04-25

## 2018-04-24 RX ORDER — ONDANSETRON 8 MG/1
4 TABLET, FILM COATED ORAL EVERY 6 HOURS
Qty: 0 | Refills: 0 | Status: DISCONTINUED | OUTPATIENT
Start: 2018-04-24 | End: 2018-05-02

## 2018-04-24 RX ORDER — SODIUM CHLORIDE 9 MG/ML
1000 INJECTION INTRAMUSCULAR; INTRAVENOUS; SUBCUTANEOUS
Qty: 0 | Refills: 0 | Status: DISCONTINUED | OUTPATIENT
Start: 2018-04-24 | End: 2018-04-24

## 2018-04-24 RX ADMIN — HYDROMORPHONE HYDROCHLORIDE 1 MILLIGRAM(S): 2 INJECTION INTRAMUSCULAR; INTRAVENOUS; SUBCUTANEOUS at 20:48

## 2018-04-24 RX ADMIN — HEPARIN SODIUM 5000 UNIT(S): 5000 INJECTION INTRAVENOUS; SUBCUTANEOUS at 21:52

## 2018-04-24 RX ADMIN — HYDROMORPHONE HYDROCHLORIDE 1 MILLIGRAM(S): 2 INJECTION INTRAMUSCULAR; INTRAVENOUS; SUBCUTANEOUS at 13:50

## 2018-04-24 RX ADMIN — Medication 2: at 21:53

## 2018-04-24 RX ADMIN — GABAPENTIN 300 MILLIGRAM(S): 400 CAPSULE ORAL at 05:18

## 2018-04-24 RX ADMIN — PIPERACILLIN AND TAZOBACTAM 200 GRAM(S): 4; .5 INJECTION, POWDER, LYOPHILIZED, FOR SOLUTION INTRAVENOUS at 05:18

## 2018-04-24 RX ADMIN — HYDROMORPHONE HYDROCHLORIDE 1 MILLIGRAM(S): 2 INJECTION INTRAMUSCULAR; INTRAVENOUS; SUBCUTANEOUS at 14:14

## 2018-04-24 RX ADMIN — Medication 4: at 19:05

## 2018-04-24 RX ADMIN — OXYCODONE HYDROCHLORIDE 5 MILLIGRAM(S): 5 TABLET ORAL at 04:43

## 2018-04-24 RX ADMIN — Medication 650 MILLIGRAM(S): at 00:30

## 2018-04-24 RX ADMIN — OXYCODONE HYDROCHLORIDE 5 MILLIGRAM(S): 5 TABLET ORAL at 22:07

## 2018-04-24 RX ADMIN — PIPERACILLIN AND TAZOBACTAM 200 GRAM(S): 4; .5 INJECTION, POWDER, LYOPHILIZED, FOR SOLUTION INTRAVENOUS at 19:06

## 2018-04-24 RX ADMIN — SIMVASTATIN 40 MILLIGRAM(S): 20 TABLET, FILM COATED ORAL at 21:53

## 2018-04-24 RX ADMIN — GABAPENTIN 300 MILLIGRAM(S): 400 CAPSULE ORAL at 21:52

## 2018-04-24 RX ADMIN — OXYCODONE HYDROCHLORIDE 5 MILLIGRAM(S): 5 TABLET ORAL at 21:52

## 2018-04-24 RX ADMIN — PIPERACILLIN AND TAZOBACTAM 200 GRAM(S): 4; .5 INJECTION, POWDER, LYOPHILIZED, FOR SOLUTION INTRAVENOUS at 12:18

## 2018-04-24 RX ADMIN — HYDROMORPHONE HYDROCHLORIDE 0.5 MILLIGRAM(S): 2 INJECTION INTRAMUSCULAR; INTRAVENOUS; SUBCUTANEOUS at 14:34

## 2018-04-24 RX ADMIN — Medication 81 MILLIGRAM(S): at 21:52

## 2018-04-24 RX ADMIN — SENNA PLUS 2 TABLET(S): 8.6 TABLET ORAL at 21:52

## 2018-04-24 RX ADMIN — HEPARIN SODIUM 5000 UNIT(S): 5000 INJECTION INTRAVENOUS; SUBCUTANEOUS at 05:18

## 2018-04-24 RX ADMIN — OXYCODONE HYDROCHLORIDE 5 MILLIGRAM(S): 5 TABLET ORAL at 05:43

## 2018-04-24 RX ADMIN — Medication 100 MILLIGRAM(S): at 21:52

## 2018-04-24 RX ADMIN — HYDROMORPHONE HYDROCHLORIDE 1 MILLIGRAM(S): 2 INJECTION INTRAMUSCULAR; INTRAVENOUS; SUBCUTANEOUS at 20:33

## 2018-04-24 RX ADMIN — ONDANSETRON 4 MILLIGRAM(S): 8 TABLET, FILM COATED ORAL at 20:32

## 2018-04-24 NOTE — BRIEF OPERATIVE NOTE - OPERATION/FINDINGS
Debridement of chronic wound of left lower extremity lateral malleolus (5 x 2 cm). Debridement until healthy bleeding tissue. Wound VAC placed over wound.

## 2018-04-24 NOTE — PROGRESS NOTE ADULT - SUBJECTIVE AND OBJECTIVE BOX
24hr Events:  O/N: ROXANN, VSS, NPO/IVF, pre-oped/consented  4/23: Admitted, started on Zosyn, VSS      Assessment/Plan;    68yFemale with chronic LLE lateral wound with persistent pain presents for surgical debridement and IV abx after multiple failed courses of PO antibiotics and previous hospital admission requiring IV Zosyn. She presents appearing non-toxic with no signs or symptoms of active infection, afebrile, HD stable.        - NPO/IVF for OR today for LLE lateral wound debridement   - IV abx - previous cultures growing pseudomonas sensitive to Zosyn.  - Repeat wound cultures in OR  - continue home medications   - ISS  - DVT ppx   - AM labs   - Daily wound care: wet to dry 24hr Events:  O/N: ROXANN, VSS, NPO/IVF, pre-oped/consented  4/23: Admitted, started on Zosyn, VSS    piperacillin/tazobactam IVPB. 4.5  aspirin enteric coated 81  heparin  Injectable 5000  piperacillin/tazobactam IVPB. 4.5        Vital Signs Last 24 Hrs  T(C): 35.7 (24 Apr 2018 05:54), Max: 36.7 (23 Apr 2018 10:07)  T(F): 96.3 (24 Apr 2018 05:54), Max: 98 (23 Apr 2018 10:07)  HR: 51 (24 Apr 2018 05:54) (51 - 71)  BP: 102/70 (24 Apr 2018 05:54) (102/70 - 134/74)  BP(mean): --  RR: 16 (24 Apr 2018 05:54) (16 - 18)  SpO2: 98% (24 Apr 2018 05:54) (98% - 100%)  I&O's Summary    23 Apr 2018 07:01  -  24 Apr 2018 06:58  --------------------------------------------------------  IN: 680 mL / OUT: 0 mL / NET: 680 mL        Physical Exam:  General: NAD, resting comfortably in bed  Pulmonary: Nonlabored breathing, no respiratory distress  Wounds: left lateral malleolar ulceration 2x0.5cm, healing with scab, no purulence or drainage, minimal surrounding erythema/no significant cellulitic changes.   Pulses:                     R:      L:   Femoral: 2+ / 2+  Popliteal: 2+ / 2+  Post Tib: triphasic bilaterally   D. Pedis 2+ / 2+      LABS:                        11.0   4.1   )-----------( 242      ( 23 Apr 2018 10:53 )             34.6     04-23    136  |  98  |  23  ----------------------------<  236<H>  4.6   |  27  |  0.88    Ca    10.2      23 Apr 2018 10:53    TPro  8.1  /  Alb  4.8  /  TBili  0.4  /  DBili  x   /  AST  28  /  ALT  20  /  AlkPhos  97  04-23    PT/INR - ( 23 Apr 2018 10:53 )   PT: 11.1 sec;   INR: 1.00          PTT - ( 23 Apr 2018 10:53 )  PTT:34.8 sec    Radiology and Additional Studies:    Assessment and Plan:     Assessment/Plan;    68yFemale with chronic LLE lateral wound with persistent pain presents for surgical debridement and IV abx after multiple failed courses of PO antibiotics and previous hospital admission requiring IV Zosyn. She presents appearing non-toxic with no signs or symptoms of active infection, afebrile, HD stable.        - NPO/IVF for OR today for LLE lateral wound debridement   - IV abx - previous cultures growing pseudomonas sensitive to Zosyn.  - Repeat wound cultures in OR  - continue home medications   - ISS  - DVT ppx   - AM labs   - Daily wound care: wet to dry

## 2018-04-24 NOTE — PROGRESS NOTE ADULT - SUBJECTIVE AND OBJECTIVE BOX
Surgery Post-Op Note, PCN:     Pre-Op Dx: Chronic wound of left lower extremity lateral malleolus  Procedure: Debridement of chronic wound of left lower extremity lateral malleolus (5 x 2 cm). Debridement until healthy bleeding tissue. Wound VAC placed over wound.    Surgeon: Dr. Bonilla    Subjective: Patient reports good pain control with medication. She denies any chest pain, shortness of breath, or dyspnea. She denies any nausea or vomiting.    Vital Signs Last 24 Hrs  T(C): 36.5 (24 Apr 2018 13:40), Max: 36.5 (24 Apr 2018 13:40)  T(F): 97.7 (24 Apr 2018 13:40), Max: 97.7 (24 Apr 2018 13:40)  HR: 54 (24 Apr 2018 15:36) (50 - 76)  BP: 116/64 (24 Apr 2018 15:36) (102/65 - 144/55)  BP(mean): 88 (24 Apr 2018 14:30) (79 - 100)  RR: 16 (24 Apr 2018 15:36) (16 - 23)  SpO2: 98% (24 Apr 2018 15:36) (98% - 100%)    Physical Exam:  General: NAD, resting comfortably in bed  Pulmonary: Nonlabored breathing, no respiratory distress  Cardiovascular: NSR  Abdominal: soft, NT/ND  Extremities: Wound VAC in place on LLE lateral malleolus        LABS:                        10.6   3.0   )-----------( 201      ( 24 Apr 2018 11:11 )             34.6     04-24    143  |  106  |  16  ----------------------------<  104<H>  4.1   |  24  |  0.90    Ca    9.6      24 Apr 2018 11:11  Phos  3.6     04-24  Mg     2.0     04-24    TPro  8.1  /  Alb  4.8  /  TBili  0.4  /  DBili  x   /  AST  28  /  ALT  20  /  AlkPhos  97  04-23    PT/INR - ( 23 Apr 2018 10:53 )   PT: 11.1 sec;   INR: 1.00          PTT - ( 23 Apr 2018 10:53 )  PTT:34.8 sec  CAPILLARY BLOOD GLUCOSE      POCT Blood Glucose.: 99 mg/dL (24 Apr 2018 12:04)  POCT Blood Glucose.: 126 mg/dL (24 Apr 2018 06:52)  POCT Blood Glucose.: 218 mg/dL (23 Apr 2018 21:26)  POCT Blood Glucose.: 261 mg/dL (23 Apr 2018 16:51)    LIVER FUNCTIONS - ( 23 Apr 2018 10:53 )  Alb: 4.8 g/dL / Pro: 8.1 g/dL / ALK PHOS: 97 U/L / ALT: 20 U/L / AST: 28 U/L / GGT: x               Radiology and Additional Studies:    Assessment:68y Female s/p above procedure    Plan:  Pain/nausea control PRN  Home meds  Incentive spirometer/OOB/Ambulate  Vitals per protocol  IV Zosyn  CLD

## 2018-04-24 NOTE — CONSULT NOTE ADULT - SUBJECTIVE AND OBJECTIVE BOX
Pain Management Consult Note - [facility] [phone]    Chief Complaint:    HPI: Patient seen at 02:00pm. Patient seen in [facility], laying in bed. Patient complains of pain to Left leg. Wound vac noted to surgical site at left leg.       Pain is __x_ sharp ____dull _x__burning __x_achy ___ Intensity: ____ mild __x_mod ___severe     Location __x__surgical site ____cervical _____lumbar ____abd ____upper ext__x__lower ext left leg    Worse with ___x_activity ___x_movement _____physical therapy___ Rest    Improved with _x___medication __x__rest ____physical therapy    ROS: Const:  _-__febrile   Eyes:___ENT:___CV: __-_chest pain  Resp: __-__sob  GI:___nausea ___vomiting __-_abd pain ___npo ___clears __full diet __bm  :___ Musk: __x_pain ___spasm  Skin:___ Neuro:  _-__evehzqzk__-_gbijlfgit___ numbness __x_weakness ___paresth  Psych:_x_anxiety  Endo:___ Heme:___Allergy:_________, _x__all others reviewed and negative    PAST MEDICAL & SURGICAL HISTORY:  Varicose vein of leg  Type 2 diabetes mellitus without complication, without long-term current use of insulin  Essential hypertension  Hyperlipidemia, unspecified hyperlipidemia type  No significant past surgical history      SH: _-__Tobacco   __-_Alcohol                          FH:FAMILY HISTORY:denies  No pertinent family history in first degree relatives      aspirin enteric coated 81 milliGRAM(s) Oral daily  dextrose 5%. 1000 milliLiter(s) IV Continuous <Continuous>  dextrose 50% Injectable 12.5 Gram(s) IV Push once  dextrose 50% Injectable 25 Gram(s) IV Push once  dextrose 50% Injectable 25 Gram(s) IV Push once  dextrose Gel 1 Dose(s) Oral once PRN  docusate sodium 100 milliGRAM(s) Oral three times a day  gabapentin 300 milliGRAM(s) Oral three times a day  glucagon  Injectable 1 milliGRAM(s) IntraMuscular once PRN  heparin  Injectable 5000 Unit(s) SubCutaneous every 8 hours  HYDROmorphone  Injectable 0.5 milliGRAM(s) IV Push every 4 hours PRN  HYDROmorphone  Injectable 1 milliGRAM(s) IV Push every 4 hours PRN  insulin lispro (HumaLOG) corrective regimen sliding scale   SubCutaneous Before meals and at bedtime  ondansetron Injectable 4 milliGRAM(s) IV Push every 6 hours PRN  oxyCODONE    IR 5 milliGRAM(s) Oral every 6 hours PRN  piperacillin/tazobactam IVPB. 4.5 Gram(s) IV Intermittent every 6 hours  senna 2 Tablet(s) Oral at bedtime  simvastatin 40 milliGRAM(s) Oral at bedtime      T(C): 36.5 (04-24-18 @ 13:40), Max: 36.5 (04-24-18 @ 13:40)  HR: 54 (04-24-18 @ 15:36) (50 - 76)  BP: 116/64 (04-24-18 @ 15:36) (102/65 - 144/55)  RR: 16 (04-24-18 @ 15:36) (16 - 23)  SpO2: 98% (04-24-18 @ 15:36) (98% - 100%)  Wt(kg): --    T(C): 36.5 (04-24-18 @ 13:40), Max: 36.5 (04-24-18 @ 13:40)  HR: 54 (04-24-18 @ 15:36) (50 - 76)  BP: 116/64 (04-24-18 @ 15:36) (102/65 - 144/55)  RR: 16 (04-24-18 @ 15:36) (16 - 23)  SpO2: 98% (04-24-18 @ 15:36) (98% - 100%)  Wt(kg): --    T(C): 36.5 (04-24-18 @ 13:40), Max: 36.5 (04-24-18 @ 13:40)  HR: 54 (04-24-18 @ 15:36) (50 - 76)  BP: 116/64 (04-24-18 @ 15:36) (102/65 - 144/55)  RR: 16 (04-24-18 @ 15:36) (16 - 23)  SpO2: 98% (04-24-18 @ 15:36) (98% - 100%)  Wt(kg): --    PHYSICAL EXAM:  Gen Appearance: __x_no acute distress __x_appropriate        Neuro: __x_SILT feet____ EOM Intact Psych: AAOX__, ___mood/affect appropriate        Eyes: __x_conjunctiva WNL  _____ Pupils equal and round        ENT: _x__ears and nose atraumatic___ Hearing grossly intact        Neck: _x__trachea midline, no visible masses ___thyroid without palpable mass    Resp: _x__Nml WOB____No tactile fremitus ___clear to auscultation    Cardio: _x__extremities free from edema ____pedal pulses palpable    GI/Abdomen: _x__soft ___x__ Nontender__x____Nondistended_____HSM    Lymphatic: ___no palpable nodes in neck  ___no palpable nodes calves and feet    Skin/Wound: __x_Incision, _x__Dressing c/d/i,   _x___surrounding tissues soft,  _x__drain/wound vac present    Muscular: EHL Left leg __2_/5  Gastrocnemius__2_/5    _x__absent clubbing/cyanosis      ASSESSMENT: This is a 68y old Female with a history of   SKIN ULCER DUE TOVARICOSE VEINS  No h/o HF  No pertinent family history in first degree relatives  Handoff  MEWS Score  Varicose vein of leg  Type 2 diabetes mellitus without complication, without long-term current use of insulin  Essential hypertension  Hyperlipidemia, unspecified hyperlipidemia type  Wound of left lower extremity  Wound of left lower extremity  Skin ulcer due to varicose veins  Debridement  No significant past surgical history  LEG PAIN  17      Recommended Treatment PLAN:  1.Recommend changing 0.5mg Dilaudid IVP to Q2h for moderate pain  2. Recommend discontinuing Dilaudid 1mg IVP q6h for severe pain  3. Recommend continue Gabapentin 300mg TID PO  4. Recommend discontinuing Oxycodone IR 5mg Q6h PO  5.Recommend starting Percocet 5/325mg 1-2 tablets Q4h PO for moderate to severe pain   Plan discussed with Dr. Ulisses Larry

## 2018-04-25 LAB
ANION GAP SERPL CALC-SCNC: 13 MMOL/L — SIGNIFICANT CHANGE UP (ref 5–17)
BUN SERPL-MCNC: 16 MG/DL — SIGNIFICANT CHANGE UP (ref 7–23)
CALCIUM SERPL-MCNC: 9.5 MG/DL — SIGNIFICANT CHANGE UP (ref 8.4–10.5)
CHLORIDE SERPL-SCNC: 99 MMOL/L — SIGNIFICANT CHANGE UP (ref 96–108)
CO2 SERPL-SCNC: 24 MMOL/L — SIGNIFICANT CHANGE UP (ref 22–31)
CREAT SERPL-MCNC: 0.84 MG/DL — SIGNIFICANT CHANGE UP (ref 0.5–1.3)
GLUCOSE BLDC GLUCOMTR-MCNC: 182 MG/DL — HIGH (ref 70–99)
GLUCOSE BLDC GLUCOMTR-MCNC: 189 MG/DL — HIGH (ref 70–99)
GLUCOSE BLDC GLUCOMTR-MCNC: 271 MG/DL — HIGH (ref 70–99)
GLUCOSE BLDC GLUCOMTR-MCNC: 374 MG/DL — HIGH (ref 70–99)
GLUCOSE SERPL-MCNC: 181 MG/DL — HIGH (ref 70–99)
GRAM STN FLD: SIGNIFICANT CHANGE UP
HCT VFR BLD CALC: 32.8 % — LOW (ref 34.5–45)
HGB BLD-MCNC: 10.2 G/DL — LOW (ref 11.5–15.5)
MAGNESIUM SERPL-MCNC: 1.9 MG/DL — SIGNIFICANT CHANGE UP (ref 1.6–2.6)
MCHC RBC-ENTMCNC: 28.5 PG — SIGNIFICANT CHANGE UP (ref 27–34)
MCHC RBC-ENTMCNC: 31.1 G/DL — LOW (ref 32–36)
MCV RBC AUTO: 91.6 FL — SIGNIFICANT CHANGE UP (ref 80–100)
PHOSPHATE SERPL-MCNC: 4.2 MG/DL — SIGNIFICANT CHANGE UP (ref 2.5–4.5)
PLATELET # BLD AUTO: 194 K/UL — SIGNIFICANT CHANGE UP (ref 150–400)
POTASSIUM SERPL-MCNC: 4.6 MMOL/L — SIGNIFICANT CHANGE UP (ref 3.5–5.3)
POTASSIUM SERPL-SCNC: 4.6 MMOL/L — SIGNIFICANT CHANGE UP (ref 3.5–5.3)
RBC # BLD: 3.58 M/UL — LOW (ref 3.8–5.2)
RBC # FLD: 15 % — SIGNIFICANT CHANGE UP (ref 10.3–16.9)
SODIUM SERPL-SCNC: 136 MMOL/L — SIGNIFICANT CHANGE UP (ref 135–145)
SPECIMEN SOURCE: SIGNIFICANT CHANGE UP
WBC # BLD: 5.9 K/UL — SIGNIFICANT CHANGE UP (ref 3.8–10.5)
WBC # FLD AUTO: 5.9 K/UL — SIGNIFICANT CHANGE UP (ref 3.8–10.5)

## 2018-04-25 RX ORDER — MAGNESIUM OXIDE 400 MG ORAL TABLET 241.3 MG
400 TABLET ORAL
Qty: 0 | Refills: 0 | Status: COMPLETED | OUTPATIENT
Start: 2018-04-25 | End: 2018-04-25

## 2018-04-25 RX ORDER — HYDROMORPHONE HYDROCHLORIDE 2 MG/ML
0.5 INJECTION INTRAMUSCULAR; INTRAVENOUS; SUBCUTANEOUS
Qty: 0 | Refills: 0 | Status: DISCONTINUED | OUTPATIENT
Start: 2018-04-25 | End: 2018-05-01

## 2018-04-25 RX ORDER — VANCOMYCIN HCL 1 G
1000 VIAL (EA) INTRAVENOUS EVERY 12 HOURS
Qty: 0 | Refills: 0 | Status: DISCONTINUED | OUTPATIENT
Start: 2018-04-25 | End: 2018-04-27

## 2018-04-25 RX ORDER — DIPHENHYDRAMINE HCL 50 MG
25 CAPSULE ORAL DAILY
Qty: 0 | Refills: 0 | Status: DISCONTINUED | OUTPATIENT
Start: 2018-04-25 | End: 2018-05-02

## 2018-04-25 RX ORDER — OXYCODONE AND ACETAMINOPHEN 5; 325 MG/1; MG/1
2 TABLET ORAL EVERY 4 HOURS
Qty: 0 | Refills: 0 | Status: DISCONTINUED | OUTPATIENT
Start: 2018-04-25 | End: 2018-05-02

## 2018-04-25 RX ADMIN — Medication 100 MILLIGRAM(S): at 05:41

## 2018-04-25 RX ADMIN — OXYCODONE AND ACETAMINOPHEN 2 TABLET(S): 5; 325 TABLET ORAL at 15:58

## 2018-04-25 RX ADMIN — Medication 10: at 17:46

## 2018-04-25 RX ADMIN — LOSARTAN POTASSIUM 100 MILLIGRAM(S): 100 TABLET, FILM COATED ORAL at 05:41

## 2018-04-25 RX ADMIN — PIPERACILLIN AND TAZOBACTAM 200 GRAM(S): 4; .5 INJECTION, POWDER, LYOPHILIZED, FOR SOLUTION INTRAVENOUS at 23:08

## 2018-04-25 RX ADMIN — OXYCODONE AND ACETAMINOPHEN 2 TABLET(S): 5; 325 TABLET ORAL at 21:16

## 2018-04-25 RX ADMIN — HYDROMORPHONE HYDROCHLORIDE 1 MILLIGRAM(S): 2 INJECTION INTRAMUSCULAR; INTRAVENOUS; SUBCUTANEOUS at 05:40

## 2018-04-25 RX ADMIN — HYDROMORPHONE HYDROCHLORIDE 0.5 MILLIGRAM(S): 2 INJECTION INTRAMUSCULAR; INTRAVENOUS; SUBCUTANEOUS at 09:48

## 2018-04-25 RX ADMIN — Medication 100 MILLIGRAM(S): at 21:30

## 2018-04-25 RX ADMIN — MAGNESIUM OXIDE 400 MG ORAL TABLET 400 MILLIGRAM(S): 241.3 TABLET ORAL at 17:47

## 2018-04-25 RX ADMIN — PIPERACILLIN AND TAZOBACTAM 200 GRAM(S): 4; .5 INJECTION, POWDER, LYOPHILIZED, FOR SOLUTION INTRAVENOUS at 12:00

## 2018-04-25 RX ADMIN — Medication 2: at 12:00

## 2018-04-25 RX ADMIN — HYDROMORPHONE HYDROCHLORIDE 0.5 MILLIGRAM(S): 2 INJECTION INTRAMUSCULAR; INTRAVENOUS; SUBCUTANEOUS at 02:14

## 2018-04-25 RX ADMIN — HYDROMORPHONE HYDROCHLORIDE 0.5 MILLIGRAM(S): 2 INJECTION INTRAMUSCULAR; INTRAVENOUS; SUBCUTANEOUS at 22:47

## 2018-04-25 RX ADMIN — PIPERACILLIN AND TAZOBACTAM 200 GRAM(S): 4; .5 INJECTION, POWDER, LYOPHILIZED, FOR SOLUTION INTRAVENOUS at 17:47

## 2018-04-25 RX ADMIN — Medication 81 MILLIGRAM(S): at 12:00

## 2018-04-25 RX ADMIN — HYDROMORPHONE HYDROCHLORIDE 0.5 MILLIGRAM(S): 2 INJECTION INTRAMUSCULAR; INTRAVENOUS; SUBCUTANEOUS at 09:17

## 2018-04-25 RX ADMIN — HYDROMORPHONE HYDROCHLORIDE 0.5 MILLIGRAM(S): 2 INJECTION INTRAMUSCULAR; INTRAVENOUS; SUBCUTANEOUS at 17:46

## 2018-04-25 RX ADMIN — HYDROMORPHONE HYDROCHLORIDE 0.5 MILLIGRAM(S): 2 INJECTION INTRAMUSCULAR; INTRAVENOUS; SUBCUTANEOUS at 19:37

## 2018-04-25 RX ADMIN — HYDROMORPHONE HYDROCHLORIDE 0.5 MILLIGRAM(S): 2 INJECTION INTRAMUSCULAR; INTRAVENOUS; SUBCUTANEOUS at 15:30

## 2018-04-25 RX ADMIN — HYDROMORPHONE HYDROCHLORIDE 0.5 MILLIGRAM(S): 2 INJECTION INTRAMUSCULAR; INTRAVENOUS; SUBCUTANEOUS at 22:17

## 2018-04-25 RX ADMIN — OXYCODONE AND ACETAMINOPHEN 2 TABLET(S): 5; 325 TABLET ORAL at 17:32

## 2018-04-25 RX ADMIN — Medication 100 MILLIGRAM(S): at 13:18

## 2018-04-25 RX ADMIN — HYDROMORPHONE HYDROCHLORIDE 1 MILLIGRAM(S): 2 INJECTION INTRAMUSCULAR; INTRAVENOUS; SUBCUTANEOUS at 05:55

## 2018-04-25 RX ADMIN — MAGNESIUM OXIDE 400 MG ORAL TABLET 400 MILLIGRAM(S): 241.3 TABLET ORAL at 09:17

## 2018-04-25 RX ADMIN — SIMVASTATIN 40 MILLIGRAM(S): 20 TABLET, FILM COATED ORAL at 21:30

## 2018-04-25 RX ADMIN — Medication 6: at 22:12

## 2018-04-25 RX ADMIN — OXYCODONE AND ACETAMINOPHEN 2 TABLET(S): 5; 325 TABLET ORAL at 12:00

## 2018-04-25 RX ADMIN — HEPARIN SODIUM 5000 UNIT(S): 5000 INJECTION INTRAVENOUS; SUBCUTANEOUS at 05:41

## 2018-04-25 RX ADMIN — Medication 2: at 07:45

## 2018-04-25 RX ADMIN — HYDROMORPHONE HYDROCHLORIDE 0.5 MILLIGRAM(S): 2 INJECTION INTRAMUSCULAR; INTRAVENOUS; SUBCUTANEOUS at 01:59

## 2018-04-25 RX ADMIN — PIPERACILLIN AND TAZOBACTAM 200 GRAM(S): 4; .5 INJECTION, POWDER, LYOPHILIZED, FOR SOLUTION INTRAVENOUS at 01:02

## 2018-04-25 RX ADMIN — GABAPENTIN 300 MILLIGRAM(S): 400 CAPSULE ORAL at 21:30

## 2018-04-25 RX ADMIN — HEPARIN SODIUM 5000 UNIT(S): 5000 INJECTION INTRAVENOUS; SUBCUTANEOUS at 21:30

## 2018-04-25 RX ADMIN — GABAPENTIN 300 MILLIGRAM(S): 400 CAPSULE ORAL at 05:41

## 2018-04-25 RX ADMIN — SENNA PLUS 2 TABLET(S): 8.6 TABLET ORAL at 21:30

## 2018-04-25 RX ADMIN — Medication 166.67 MILLIGRAM(S): at 13:18

## 2018-04-25 RX ADMIN — Medication 25 MILLIGRAM(S): at 15:58

## 2018-04-25 RX ADMIN — HYDROMORPHONE HYDROCHLORIDE 0.5 MILLIGRAM(S): 2 INJECTION INTRAMUSCULAR; INTRAVENOUS; SUBCUTANEOUS at 13:18

## 2018-04-25 RX ADMIN — PIPERACILLIN AND TAZOBACTAM 200 GRAM(S): 4; .5 INJECTION, POWDER, LYOPHILIZED, FOR SOLUTION INTRAVENOUS at 07:09

## 2018-04-25 RX ADMIN — GABAPENTIN 300 MILLIGRAM(S): 400 CAPSULE ORAL at 13:18

## 2018-04-25 RX ADMIN — OXYCODONE AND ACETAMINOPHEN 2 TABLET(S): 5; 325 TABLET ORAL at 12:43

## 2018-04-25 RX ADMIN — OXYCODONE AND ACETAMINOPHEN 2 TABLET(S): 5; 325 TABLET ORAL at 20:16

## 2018-04-25 RX ADMIN — OXYCODONE AND ACETAMINOPHEN 2 TABLET(S): 5; 325 TABLET ORAL at 08:20

## 2018-04-25 RX ADMIN — HEPARIN SODIUM 5000 UNIT(S): 5000 INJECTION INTRAVENOUS; SUBCUTANEOUS at 13:18

## 2018-04-25 RX ADMIN — OXYCODONE AND ACETAMINOPHEN 2 TABLET(S): 5; 325 TABLET ORAL at 07:45

## 2018-04-25 RX ADMIN — MAGNESIUM OXIDE 400 MG ORAL TABLET 400 MILLIGRAM(S): 241.3 TABLET ORAL at 12:00

## 2018-04-25 NOTE — PROGRESS NOTE ADULT - SUBJECTIVE AND OBJECTIVE BOX
Pain Management Progress Note - [facility] [phone]    HPI: Patient seen at 08:45 am. Patient laying in bed. Patient with wound vac to Left ankle. Patient states that her pain is not controlled.             Pertinent PMH: Pain at: ___Back ___Neck___Knee ___Hip ___Shoulder ___ Opioid tolerance __x__ Left Lower extremity    Pain is ___ sharp _x___dull _x__burning __x_achy ___ Intensity: ____ mild __x__mod __x__severe     Location __x___surgical site _____cervical _____lumbar ____abd _____upper ext__x__lower ext Left    Worse with ___x_activity x____movement _____physical therapy___ Rest    Improved with __x__medication _x___rest ____physical therapy    heparin  Injectable  insulin lispro (HumaLOG) corrective regimen sliding scale  dextrose 5%.  dextrose Gel  dextrose 50% Injectable  glucagon  Injectable  piperacillin/tazobactam IVPB.  oxyCODONE    5 mG/acetaminophen 325 mG  oxyCODONE    IR  losartan  gabapentin  simvastatin  senna  insulin lispro (HumaLOG) corrective regimen sliding scale  aspirin enteric coated  sodium chloride 0.9%.  acetaminophen   Tablet.  heparin  Injectable  HYDROmorphone  Injectable  aspirin enteric coated  ondansetron Injectable  docusate sodium  sodium chloride 0.9%.  piperacillin/tazobactam IVPB.  losartan  HYDROmorphone  Injectable  oxyCODONE    5 mG/acetaminophen 325 mG  magnesium oxide      ROS: Const:  __-_febrile   Eyes:___ENT:___CV: __-_chest pain  Resp: __-__sob  GI:_-__nausea _-__vomiting __-__abd pain ___npo ___clears ___full diet __bm  :___ Musk: _x__pain ___spasm  Skin:___ Neuro:  __-_wmtormbb__-_onyvmljgm____ numbness _-__weakness ___paresthesia  Psych:__x_anxiety  Endo:___ Heme:___Allergy:___   _____all other systems reviewed and negative    04-25 @ 05:3472 mL/min/1.73M2    04-24 @ 11:1167 mL/min/1.73M2    Hemoglobin: 10.2 g/dL (04-25 @ 05:33)  Hemoglobin: 10.6 g/dL (04-24 @ 11:11)  Hemoglobin: 11.0 g/dL (04-23 @ 10:53)    T(C): 37.3 (04-25-18 @ 05:42), Max: 37.3 (04-25-18 @ 05:42)  HR: 71 (04-25-18 @ 05:42) (54 - 76)  BP: 124/74 (04-25-18 @ 05:42) (96/56 - 144/55)  RR: 17 (04-25-18 @ 05:42) (16 - 23)  SpO2: 98% (04-25-18 @ 05:42) (94% - 100%)  Wt(kg): --     PHYSICAL EXAM:  Gen Appearance: _x__no acute distress _x__appropriate         Neuro: _x__SILT feet____ EOM Intact Psych: AAOX_3_, _x__mood/affect appropriate        Eyes: _x__conjunctiva WNL  _____ Pupils equal and round        ENT: _x__ears and nose atraumatic___ Hearing grossly intact        Neck: _x__trachea midline, no visible masses ___thyroid without palpable mass    Resp: _x__Nml WOB____No tactile fremitus ___clear to auscultation    Cardio: _x__extremities free from edema __x__pedal pulses palpable    GI/Abdomen: _x__soft ___x__ Nontender___x___Nondistended_____HSM    Lymphatic: ___no palpable nodes in neck  _x__no palpable nodes calves and feet    Skin/Wound: _x__Incision, _x__Dressing c/d/i,   __x__surrounding tissues soft,  _x__drain/wound vac    Muscular: EHL __4_/5  Gastrocnemius__4_/5    _x__absent clubbing/cyanosis         ASSESSMENT:  This is a 68y old Female with a history of:  SKIN ULCER DUE TOVARICOSE VEINS  No h/o HF  No pertinent family history in first degree relatives  Handoff  MEWS Score  Varicose vein of leg  Type 2 diabetes mellitus without complication, without long-term current use of insulin  Essential hypertension  Hyperlipidemia, unspecified hyperlipidemia type  Wound of left lower extremity  Wound of left lower extremity  Skin ulcer due to varicose veins  Debridement  No significant past surgical history  LEG PAIN  17        Recommended Treatment PLAN:  1. Recommend changing Dilaudid IVP to 1mg IVP PRN Q2h for break through pain  2. Recommend increasing Gabapentin to 400mg TID PO  3. Recommend Discontinuing Percocet 2 tablets PO for moderate to severe pain  4. Recommend Oxycodone 15mg Q4 Prn pain moderate to severe pain  5. Recommend Ibuprofen 600mg TID standing order   Plan discussed with MD. Ulisses Larry

## 2018-04-25 NOTE — PROGRESS NOTE ADULT - SUBJECTIVE AND OBJECTIVE BOX
24hr Events:  O/N: Pt seen by by Pain management rec dilaudid 0.5 q2hr for moderate pain and Percocet 1-2 tabs q4hr for severe pain and continue gabapentin. Discontinued dilaudid 1mg  4/24: OR for Debridement of chronic wound of left lower extremity lateral malleolus, POC WNL          Assessment/Plan:  68yFemale with chronic LLE lateral wound with persistent pain presents for surgical debridement and IV abx after multiple failed courses of PO antibiotics and previous hospital admission requiring IV Zosyn. She presents appearing non-toxic with no signs or symptoms of active infection, afebrile now s/p Debridement of chronic wound of left lower extremity lateral malleolus    Consistent Carb diet  - IV abx - previous cultures growing pseudomonas sensitive to Zosyn.  - F/u OR cultures  - continue home medications   - ISS  - DVT ppx   - AM labs   - Wound Vac in place for LLE wound 24hr Events:  O/N: Pt seen by by Pain management rec dilaudid 0.5 q2hr for moderate pain and Percocet 1-2 tabs q4hr for severe pain and continue gabapentin. Discontinued dilaudid 1mg  4/24: OR for Debridement of chronic wound of left lower extremity lateral malleolus, POC WNL    S. c/o pain in leg.     piperacillin/tazobactam IVPB. 4.5  aspirin enteric coated 81  heparin  Injectable 5000  losartan 100  piperacillin/tazobactam IVPB. 4.5      Allergies    No Known Allergies    Intolerances    codeine (Vomiting)      Vital Signs Last 24 Hrs  T(C): 37.3 (25 Apr 2018 05:42), Max: 37.3 (25 Apr 2018 05:42)  T(F): 99.2 (25 Apr 2018 05:42), Max: 99.2 (25 Apr 2018 05:42)  HR: 71 (25 Apr 2018 05:42) (50 - 76)  BP: 124/74 (25 Apr 2018 05:42) (96/56 - 144/55)  BP(mean): 88 (24 Apr 2018 14:30) (79 - 100)  RR: 17 (25 Apr 2018 05:42) (16 - 23)  SpO2: 98% (25 Apr 2018 05:42) (94% - 100%)    Physical Exam:  General: resting comfortably.  Pulmonary:  Cardiovascular:  Abdominal:  Extremities: Left leg VAC in place and functioning.  Pulses:   Right:                                                                           Left:  FEM [ ]2+ [ ]1+ [ ] doppler                                            FEM [ ]2+ [ ]1+ [ ] doppler    POP [ ]2+ [ ]1+ [ ] doppler                                            POP [ ]2+ [ ]1+ [ ] doppler    DP [ ]2+ [ ]1+ [ ] doppler                                               DP [ ]2+ [ ]1+ [ ] doppler  PT[ ]2+ [ ]1+ [ ] doppler                                                 PT [ ]2+ [ ]1+ [ ] doppler      LABS:                        10.2   5.9   )-----------( 194      ( 25 Apr 2018 05:33 )             32.8     04-25    136  |  99  |  16  ----------------------------<  181<H>  4.6   |  24  |  0.84    Ca    9.5      25 Apr 2018 05:34  Phos  4.2     04-25  Mg     1.9     04-25    TPro  8.1  /  Alb  4.8  /  TBili  0.4  /  DBili  x   /  AST  28  /  ALT  20  /  AlkPhos  97  04-23    PT/INR - ( 23 Apr 2018 10:53 )   PT: 11.1 sec;   INR: 1.00          PTT - ( 23 Apr 2018 10:53 )  PTT:34.8 sec      RADIOLOGY & ADDITIONAL TESTS:        Assessment/Plan:  68yFemale with chronic LLE lateral wound with persistent pain presents for surgical debridement and IV abx after multiple failed courses of PO antibiotics and previous hospital admission requiring IV Zosyn. She presents appearing non-toxic with no signs or symptoms of active infection, afebrile now s/p Debridement of chronic wound of left lower extremity lateral malleolus    Consistent Carb diet  - IV abx - previous cultures growing pseudomonas sensitive to Zosyn.  - Pain management recommendations initiated.  - PT evaluation  - OOB and ambulate.  - F/u OR cultures  - continue home medications   - ISS  - DVT ppx   - AM labs   - Wound Vac in place for LLE wound

## 2018-04-26 LAB
GLUCOSE BLDC GLUCOMTR-MCNC: 194 MG/DL — HIGH (ref 70–99)
GLUCOSE BLDC GLUCOMTR-MCNC: 224 MG/DL — HIGH (ref 70–99)
GLUCOSE BLDC GLUCOMTR-MCNC: 244 MG/DL — HIGH (ref 70–99)
GLUCOSE BLDC GLUCOMTR-MCNC: 261 MG/DL — HIGH (ref 70–99)

## 2018-04-26 RX ADMIN — HEPARIN SODIUM 5000 UNIT(S): 5000 INJECTION INTRAVENOUS; SUBCUTANEOUS at 13:28

## 2018-04-26 RX ADMIN — OXYCODONE AND ACETAMINOPHEN 2 TABLET(S): 5; 325 TABLET ORAL at 10:31

## 2018-04-26 RX ADMIN — HYDROMORPHONE HYDROCHLORIDE 0.5 MILLIGRAM(S): 2 INJECTION INTRAMUSCULAR; INTRAVENOUS; SUBCUTANEOUS at 19:24

## 2018-04-26 RX ADMIN — Medication 100 MILLIGRAM(S): at 13:28

## 2018-04-26 RX ADMIN — Medication 6: at 17:26

## 2018-04-26 RX ADMIN — Medication 166.67 MILLIGRAM(S): at 13:28

## 2018-04-26 RX ADMIN — HYDROMORPHONE HYDROCHLORIDE 0.5 MILLIGRAM(S): 2 INJECTION INTRAMUSCULAR; INTRAVENOUS; SUBCUTANEOUS at 12:32

## 2018-04-26 RX ADMIN — SIMVASTATIN 40 MILLIGRAM(S): 20 TABLET, FILM COATED ORAL at 21:02

## 2018-04-26 RX ADMIN — LOSARTAN POTASSIUM 100 MILLIGRAM(S): 100 TABLET, FILM COATED ORAL at 05:01

## 2018-04-26 RX ADMIN — HYDROMORPHONE HYDROCHLORIDE 0.5 MILLIGRAM(S): 2 INJECTION INTRAMUSCULAR; INTRAVENOUS; SUBCUTANEOUS at 05:16

## 2018-04-26 RX ADMIN — HYDROMORPHONE HYDROCHLORIDE 0.5 MILLIGRAM(S): 2 INJECTION INTRAMUSCULAR; INTRAVENOUS; SUBCUTANEOUS at 22:02

## 2018-04-26 RX ADMIN — SENNA PLUS 2 TABLET(S): 8.6 TABLET ORAL at 21:02

## 2018-04-26 RX ADMIN — PIPERACILLIN AND TAZOBACTAM 200 GRAM(S): 4; .5 INJECTION, POWDER, LYOPHILIZED, FOR SOLUTION INTRAVENOUS at 17:27

## 2018-04-26 RX ADMIN — GABAPENTIN 300 MILLIGRAM(S): 400 CAPSULE ORAL at 05:01

## 2018-04-26 RX ADMIN — OXYCODONE AND ACETAMINOPHEN 2 TABLET(S): 5; 325 TABLET ORAL at 17:23

## 2018-04-26 RX ADMIN — Medication 4: at 21:25

## 2018-04-26 RX ADMIN — HYDROMORPHONE HYDROCHLORIDE 0.5 MILLIGRAM(S): 2 INJECTION INTRAMUSCULAR; INTRAVENOUS; SUBCUTANEOUS at 15:06

## 2018-04-26 RX ADMIN — HYDROMORPHONE HYDROCHLORIDE 0.5 MILLIGRAM(S): 2 INJECTION INTRAMUSCULAR; INTRAVENOUS; SUBCUTANEOUS at 22:32

## 2018-04-26 RX ADMIN — HYDROMORPHONE HYDROCHLORIDE 0.5 MILLIGRAM(S): 2 INJECTION INTRAMUSCULAR; INTRAVENOUS; SUBCUTANEOUS at 14:51

## 2018-04-26 RX ADMIN — OXYCODONE AND ACETAMINOPHEN 2 TABLET(S): 5; 325 TABLET ORAL at 16:53

## 2018-04-26 RX ADMIN — OXYCODONE AND ACETAMINOPHEN 2 TABLET(S): 5; 325 TABLET ORAL at 00:17

## 2018-04-26 RX ADMIN — GABAPENTIN 300 MILLIGRAM(S): 400 CAPSULE ORAL at 13:28

## 2018-04-26 RX ADMIN — OXYCODONE AND ACETAMINOPHEN 2 TABLET(S): 5; 325 TABLET ORAL at 20:55

## 2018-04-26 RX ADMIN — Medication 100 MILLIGRAM(S): at 05:01

## 2018-04-26 RX ADMIN — PIPERACILLIN AND TAZOBACTAM 200 GRAM(S): 4; .5 INJECTION, POWDER, LYOPHILIZED, FOR SOLUTION INTRAVENOUS at 11:22

## 2018-04-26 RX ADMIN — OXYCODONE AND ACETAMINOPHEN 2 TABLET(S): 5; 325 TABLET ORAL at 21:55

## 2018-04-26 RX ADMIN — OXYCODONE AND ACETAMINOPHEN 2 TABLET(S): 5; 325 TABLET ORAL at 10:01

## 2018-04-26 RX ADMIN — Medication 166.67 MILLIGRAM(S): at 00:24

## 2018-04-26 RX ADMIN — PIPERACILLIN AND TAZOBACTAM 200 GRAM(S): 4; .5 INJECTION, POWDER, LYOPHILIZED, FOR SOLUTION INTRAVENOUS at 05:02

## 2018-04-26 RX ADMIN — HYDROMORPHONE HYDROCHLORIDE 0.5 MILLIGRAM(S): 2 INJECTION INTRAMUSCULAR; INTRAVENOUS; SUBCUTANEOUS at 19:54

## 2018-04-26 RX ADMIN — OXYCODONE AND ACETAMINOPHEN 2 TABLET(S): 5; 325 TABLET ORAL at 01:17

## 2018-04-26 RX ADMIN — Medication 2: at 12:17

## 2018-04-26 RX ADMIN — HEPARIN SODIUM 5000 UNIT(S): 5000 INJECTION INTRAVENOUS; SUBCUTANEOUS at 05:01

## 2018-04-26 RX ADMIN — Medication 4: at 07:34

## 2018-04-26 RX ADMIN — Medication 81 MILLIGRAM(S): at 11:22

## 2018-04-26 RX ADMIN — HYDROMORPHONE HYDROCHLORIDE 0.5 MILLIGRAM(S): 2 INJECTION INTRAMUSCULAR; INTRAVENOUS; SUBCUTANEOUS at 04:46

## 2018-04-26 RX ADMIN — Medication 25 MILLIGRAM(S): at 11:22

## 2018-04-26 RX ADMIN — GABAPENTIN 300 MILLIGRAM(S): 400 CAPSULE ORAL at 21:02

## 2018-04-26 RX ADMIN — HYDROMORPHONE HYDROCHLORIDE 0.5 MILLIGRAM(S): 2 INJECTION INTRAMUSCULAR; INTRAVENOUS; SUBCUTANEOUS at 12:17

## 2018-04-26 RX ADMIN — PIPERACILLIN AND TAZOBACTAM 200 GRAM(S): 4; .5 INJECTION, POWDER, LYOPHILIZED, FOR SOLUTION INTRAVENOUS at 22:18

## 2018-04-26 RX ADMIN — HEPARIN SODIUM 5000 UNIT(S): 5000 INJECTION INTRAVENOUS; SUBCUTANEOUS at 21:01

## 2018-04-26 RX ADMIN — Medication 100 MILLIGRAM(S): at 21:02

## 2018-04-26 NOTE — PHYSICAL THERAPY INITIAL EVALUATION ADULT - GENERAL OBSERVATIONS, REHAB EVAL
Patient received in semi-sanders position, no apparent distress, +intravenous line, +wound vac, +sequential pneumatic compression device.

## 2018-04-26 NOTE — PHYSICAL THERAPY INITIAL EVALUATION ADULT - DIAGNOSIS, PT EVAL
4D: Impaired Joint Mobility, Motor Function, Muscle Performance, and Range of Motion Associated with Connective Tissue Dysfunction

## 2018-04-26 NOTE — DIETITIAN INITIAL EVALUATION ADULT. - OTHER INFO
69yo F with chronic LLE lateral wound with persistent pain presents for surgical debridement and IV abx after multiple failed courses of PO abx and previous hospital admission requiring IV Zosyn. Pt now s/p Debridement of chronic wound of left lower extremity lateral malleolus. Pt seen resting in bed. Currently on a CSTCHO diet and tolerating PO. Denies GI distress at present however reports pain medications have causeed N/V in the past. Consuming ~50% meals. Reports pain is a 10/10. Discussed purpose of CSTCHO diet, pt was receptive and expressed understanding. Alternative menu was provided to see selection. Also discussed increased needs for wound healing. NKFA or dietary restrictions. SKin: wound vac; GI WDL per flowsheet.

## 2018-04-26 NOTE — PHYSICAL THERAPY INITIAL EVALUATION ADULT - PERTINENT HX OF CURRENT PROBLEM, REHAB EVAL
68 year old female presents from Dr. Mcclellan office on routine follow up from the hospital admission c/o with peristent LLE wound pain.

## 2018-04-26 NOTE — DIETITIAN INITIAL EVALUATION ADULT. - ENERGY NEEDS
Height: 5'7" Weight: 172lbs, IBW 135lbs+/-10%, %%, BMI 27  IBW used for calculations as pt >120% of IBW   Nutrient needs based on [facility] standards of care for maintenance in older adults.   Needs adjusted for wound healing and age

## 2018-04-26 NOTE — PROGRESS NOTE ADULT - SUBJECTIVE AND OBJECTIVE BOX
Pain Management Progress Note - [facility] [phone]    HPI: Patient seen at 08:30am . Patient laying down in bed. Patient in no apparent distress. Patient continues to complain of pain.            Pertinent PMH: Pain at: ___Back ___Neck___Knee ___Hip ___Shoulder ___ Opioid tolerance _x__ Left Lower extremity    Pain is _x__ sharp ____dull ___burning _x__achy ___ Intensity: ____ mild _x___mod __x__severe     Location __x___surgical site _____cervical _____lumbar ____abd _____upper ext__x__lower ext Left    Worse with __x__activity __x__movement _____physical therapy___ Rest    Improved with __x__medication __x__rest ____physical therapy    heparin  Injectable  insulin lispro (HumaLOG) corrective regimen sliding scale  dextrose 5%.  dextrose Gel  dextrose 50% Injectable  glucagon  Injectable  piperacillin/tazobactam IVPB.  oxyCODONE    5 mG/acetaminophen 325 mG  oxyCODONE    IR  losartan  gabapentin  simvastatin  senna  insulin lispro (HumaLOG) corrective regimen sliding scale  aspirin enteric coated  sodium chloride 0.9%.  acetaminophen   Tablet.  heparin  Injectable  HYDROmorphone  Injectable  aspirin enteric coated  ondansetron Injectable  docusate sodium  sodium chloride 0.9%.  piperacillin/tazobactam IVPB.  losartan  HYDROmorphone  Injectable  oxyCODONE    5 mG/acetaminophen 325 mG  magnesium oxide  vancomycin  IVPB  diphenhydrAMINE   Capsule      ROS: Const:  _-__febrile   Eyes:___ENT:___CV: ___chest pain  Resp: _-___sob  GI:_-__nausea _-__vomiting _-___abd pain ___npo ___clears ___full diet __bm  :___ Musk: _x__pain ___spasm  Skin:___ Neuro:  __-_emyostwp_-__soviklitl_-___ numbness __-_weakness ___paresthesia  Psych:_-__anxiety  Endo:___ Heme:___Allergy:___   _x__ All other systems reviewed and negative    Hemoglobin: 10.2 g/dL (04-25 @ 05:33)    T(C): 36.7 (04-26-18 @ 08:15), Max: 37.4 (04-25-18 @ 20:30)  HR: 54 (04-26-18 @ 08:15) (54 - 75)  BP: 111/73 (04-26-18 @ 08:15) (100/58 - 116/73)  RR: 17 (04-26-18 @ 08:15) (16 - 17)  SpO2: 100% (04-26-18 @ 08:15) (95% - 100%)  Wt(kg): --     PHYSICAL EXAM:  Gen Appearance: _x__no acute distress _x__appropriate         Neuro: _x__SILT feet____ EOM Intact Psych: AAOX_3_, __x_mood/affect appropriate        Eyes: _x__conjunctiva WNL  _____ Pupils equal and round        ENT: _x__ears and nose atraumatic___ Hearing grossly intact        Neck: _x__trachea midline, no visible masses ___thyroid without palpable mass    Resp: _x__Nml WOB____No tactile fremitus ___clear to auscultation    Cardio: ___extremities free from edema _x___pedal pulses palpable    GI/Abdomen: __x_soft __x___ Nontender____x__Nondistended_____HSM    Lymphatic: ___no palpable nodes in neck  ___no palpable nodes calves and feet    Skin/Wound: __x_Incision, _x__Dressing c/d/i,   __x __surrounding tissues soft,  ___drain/chest tube present____    Muscular: EHL __5_/5  Gastrocnemius_5__/5    __x_absent clubbing/cyanosis         ASSESSMENT:  This is a 68y old Female with a history of:  SKIN ULCER DUE TOVARICOSE VEINS  No h/o HF  No pertinent family history in first degree relatives  Handoff  MEWS Score  Varicose vein of leg  Type 2 diabetes mellitus without complication, without long-term current use of insulin  Essential hypertension  Hyperlipidemia, unspecified hyperlipidemia type  Wound of left lower extremity  Wound of left lower extremity  Skin ulcer due to varicose veins  Debridement  No significant past surgical history  LEG PAIN  17        Recommended Treatment PLAN:  1. Recommend continuing Gabapentin 300mg PO  2. Recommend continuing Percocet 1-2 tablets PO Q4H PRN for mild to moderate pain  3. Recommend continuing 0.5mg Dilaudid IVP PRN q4h for severe and break through pain  Plan discussed with Dr. Rhodes

## 2018-04-26 NOTE — PROGRESS NOTE ADULT - SUBJECTIVE AND OBJECTIVE BOX
O/N: ROXANN, VSS            Assessment/Plan:  68yFemale with chronic LLE lateral wound with persistent pain presents for surgical debridement and IV abx after multiple failed courses of PO antibiotics and previous hospital admission requiring IV Zosyn. She presents appearing non-toxic with no signs or symptoms of active infection, afebrile now s/p Debridement of chronic wound of left lower extremity lateral malleolus    Consistent Carb diet  - IV abx - previous cultures growing pseudomonas sensitive to Zosyn.  - Pain management recommendations initiated.  - PT evaluation  - OOB and ambulate.  - F/u OR cultures  - continue home medications   - ISS  - DVT ppx   - AM labs   - Wound Vac in place for LLE wound O/N: ROXANN, VSS     SUBJECTIVE: Patient seen and examined bedside by chief resident. Pain better controlled today.    aspirin enteric coated 81 milliGRAM(s) Oral daily  heparin  Injectable 5000 Unit(s) SubCutaneous every 8 hours  losartan 100 milliGRAM(s) Oral daily  piperacillin/tazobactam IVPB. 4.5 Gram(s) IV Intermittent every 6 hours  vancomycin  IVPB 1000 milliGRAM(s) IV Intermittent every 12 hours      Vital Signs Last 24 Hrs  T(C): 36.6 (26 Apr 2018 05:22), Max: 37.4 (25 Apr 2018 20:30)  T(F): 97.8 (26 Apr 2018 05:22), Max: 99.3 (25 Apr 2018 20:30)  HR: 59 (26 Apr 2018 05:22) (59 - 75)  BP: 116/73 (26 Apr 2018 05:22) (100/58 - 116/73)  BP(mean): --  RR: 17 (26 Apr 2018 05:22) (16 - 17)  SpO2: 100% (26 Apr 2018 05:22) (95% - 100%)  I&O's Detail    25 Apr 2018 07:01  -  26 Apr 2018 07:00  --------------------------------------------------------  IN:    Oral Fluid: 1240 mL    Solution: 100 mL    Solution: 250 mL  Total IN: 1590 mL    OUT:    VAC (Vacuum Assisted Closure) System: 15 mL    Voided: 500 mL  Total OUT: 515 mL    Total NET: 1075 mL          General: NAD, resting comfortably in bed  C/V: NSR  Pulm: Nonlabored breathing, no respiratory distress  Abd: soft, NT/ND.  Extrem: WWP, no edema, SCDs in place        LABS:                        10.2   5.9   )-----------( 194      ( 25 Apr 2018 05:33 )             32.8     04-25    136  |  99  |  16  ----------------------------<  181<H>  4.6   |  24  |  0.84    Ca    9.5      25 Apr 2018 05:34  Phos  4.2     04-25  Mg     1.9     04-25            RADIOLOGY & ADDITIONAL STUDIES:        Assessment/Plan:  68yFemale with chronic LLE lateral wound with persistent pain presents for surgical debridement and IV abx after multiple failed courses of PO antibiotics and previous hospital admission requiring IV Zosyn. She presents appearing non-toxic with no signs or symptoms of active infection, afebrile now s/p Debridement of chronic wound of left lower extremity lateral malleolus    Consistent Carb diet  - IV abx - previous cultures growing pseudomonas sensitive to Zosyn.  - Pain management recommendations initiated.  - PT evaluation  - OOB and ambulate.  - F/u OR cultures  - continue home medications   - ISS  - DVT ppx   - AM labs   - Replace wound VAC today.   - D/c if pain controlled.

## 2018-04-26 NOTE — PHYSICAL THERAPY INITIAL EVALUATION ADULT - CRITERIA FOR SKILLED THERAPEUTIC INTERVENTIONS
anticipated discharge recommendation/rehab potential/functional limitations in following categories/impairments found/therapy frequency

## 2018-04-26 NOTE — PHYSICAL THERAPY INITIAL EVALUATION ADULT - MANUAL MUSCLE TESTING RESULTS, REHAB EVAL
LLE not assessed 2/2 pain however pt able to lift against gravity/no strength deficits were identified

## 2018-04-27 LAB
-  AMPICILLIN: SIGNIFICANT CHANGE UP
-  VANCOMYCIN: SIGNIFICANT CHANGE UP
ANION GAP SERPL CALC-SCNC: 9 MMOL/L — SIGNIFICANT CHANGE UP (ref 5–17)
BUN SERPL-MCNC: 17 MG/DL — SIGNIFICANT CHANGE UP (ref 7–23)
CALCIUM SERPL-MCNC: 9.1 MG/DL — SIGNIFICANT CHANGE UP (ref 8.4–10.5)
CHLORIDE SERPL-SCNC: 104 MMOL/L — SIGNIFICANT CHANGE UP (ref 96–108)
CO2 SERPL-SCNC: 28 MMOL/L — SIGNIFICANT CHANGE UP (ref 22–31)
CREAT SERPL-MCNC: 1.09 MG/DL — SIGNIFICANT CHANGE UP (ref 0.5–1.3)
CULTURE RESULTS: SIGNIFICANT CHANGE UP
GLUCOSE BLDC GLUCOMTR-MCNC: 150 MG/DL — HIGH (ref 70–99)
GLUCOSE BLDC GLUCOMTR-MCNC: 236 MG/DL — HIGH (ref 70–99)
GLUCOSE BLDC GLUCOMTR-MCNC: 263 MG/DL — HIGH (ref 70–99)
GLUCOSE BLDC GLUCOMTR-MCNC: 264 MG/DL — HIGH (ref 70–99)
GLUCOSE SERPL-MCNC: 184 MG/DL — HIGH (ref 70–99)
HCT VFR BLD CALC: 30.7 % — LOW (ref 34.5–45)
HGB BLD-MCNC: 9.5 G/DL — LOW (ref 11.5–15.5)
MAGNESIUM SERPL-MCNC: 1.9 MG/DL — SIGNIFICANT CHANGE UP (ref 1.6–2.6)
MCHC RBC-ENTMCNC: 27.7 PG — SIGNIFICANT CHANGE UP (ref 27–34)
MCHC RBC-ENTMCNC: 30.9 G/DL — LOW (ref 32–36)
MCV RBC AUTO: 89.5 FL — SIGNIFICANT CHANGE UP (ref 80–100)
METHOD TYPE: SIGNIFICANT CHANGE UP
ORGANISM # SPEC MICROSCOPIC CNT: SIGNIFICANT CHANGE UP
ORGANISM # SPEC MICROSCOPIC CNT: SIGNIFICANT CHANGE UP
PHOSPHATE SERPL-MCNC: 3.1 MG/DL — SIGNIFICANT CHANGE UP (ref 2.5–4.5)
PLATELET # BLD AUTO: 185 K/UL — SIGNIFICANT CHANGE UP (ref 150–400)
POTASSIUM SERPL-MCNC: 4.1 MMOL/L — SIGNIFICANT CHANGE UP (ref 3.5–5.3)
POTASSIUM SERPL-SCNC: 4.1 MMOL/L — SIGNIFICANT CHANGE UP (ref 3.5–5.3)
RBC # BLD: 3.43 M/UL — LOW (ref 3.8–5.2)
RBC # FLD: 15.4 % — SIGNIFICANT CHANGE UP (ref 10.3–16.9)
SODIUM SERPL-SCNC: 141 MMOL/L — SIGNIFICANT CHANGE UP (ref 135–145)
SPECIMEN SOURCE: SIGNIFICANT CHANGE UP
VANCOMYCIN TROUGH SERPL-MCNC: 11 UG/ML — SIGNIFICANT CHANGE UP (ref 10–20)
WBC # BLD: 4.6 K/UL — SIGNIFICANT CHANGE UP (ref 3.8–10.5)
WBC # FLD AUTO: 4.6 K/UL — SIGNIFICANT CHANGE UP (ref 3.8–10.5)

## 2018-04-27 RX ORDER — MAGNESIUM SULFATE 500 MG/ML
1 VIAL (ML) INJECTION ONCE
Qty: 0 | Refills: 0 | Status: COMPLETED | OUTPATIENT
Start: 2018-04-27 | End: 2018-04-27

## 2018-04-27 RX ORDER — HYDROMORPHONE HYDROCHLORIDE 2 MG/ML
0.5 INJECTION INTRAMUSCULAR; INTRAVENOUS; SUBCUTANEOUS ONCE
Qty: 0 | Refills: 0 | Status: DISCONTINUED | OUTPATIENT
Start: 2018-04-27 | End: 2018-04-27

## 2018-04-27 RX ORDER — AMPICILLIN TRIHYDRATE 250 MG
1 CAPSULE ORAL EVERY 6 HOURS
Qty: 0 | Refills: 0 | Status: DISCONTINUED | OUTPATIENT
Start: 2018-04-27 | End: 2018-05-02

## 2018-04-27 RX ADMIN — PIPERACILLIN AND TAZOBACTAM 200 GRAM(S): 4; .5 INJECTION, POWDER, LYOPHILIZED, FOR SOLUTION INTRAVENOUS at 11:42

## 2018-04-27 RX ADMIN — HYDROMORPHONE HYDROCHLORIDE 0.5 MILLIGRAM(S): 2 INJECTION INTRAMUSCULAR; INTRAVENOUS; SUBCUTANEOUS at 05:41

## 2018-04-27 RX ADMIN — Medication 6: at 17:29

## 2018-04-27 RX ADMIN — Medication 108 GRAM(S): at 23:49

## 2018-04-27 RX ADMIN — HYDROMORPHONE HYDROCHLORIDE 0.5 MILLIGRAM(S): 2 INJECTION INTRAMUSCULAR; INTRAVENOUS; SUBCUTANEOUS at 18:34

## 2018-04-27 RX ADMIN — OXYCODONE AND ACETAMINOPHEN 2 TABLET(S): 5; 325 TABLET ORAL at 13:35

## 2018-04-27 RX ADMIN — OXYCODONE AND ACETAMINOPHEN 2 TABLET(S): 5; 325 TABLET ORAL at 18:34

## 2018-04-27 RX ADMIN — GABAPENTIN 300 MILLIGRAM(S): 400 CAPSULE ORAL at 13:35

## 2018-04-27 RX ADMIN — LOSARTAN POTASSIUM 100 MILLIGRAM(S): 100 TABLET, FILM COATED ORAL at 05:12

## 2018-04-27 RX ADMIN — GABAPENTIN 300 MILLIGRAM(S): 400 CAPSULE ORAL at 05:11

## 2018-04-27 RX ADMIN — PIPERACILLIN AND TAZOBACTAM 200 GRAM(S): 4; .5 INJECTION, POWDER, LYOPHILIZED, FOR SOLUTION INTRAVENOUS at 05:11

## 2018-04-27 RX ADMIN — Medication 100 MILLIGRAM(S): at 13:35

## 2018-04-27 RX ADMIN — Medication 100 MILLIGRAM(S): at 05:12

## 2018-04-27 RX ADMIN — Medication 4: at 22:15

## 2018-04-27 RX ADMIN — Medication 100 GRAM(S): at 15:51

## 2018-04-27 RX ADMIN — HYDROMORPHONE HYDROCHLORIDE 0.5 MILLIGRAM(S): 2 INJECTION INTRAMUSCULAR; INTRAVENOUS; SUBCUTANEOUS at 19:00

## 2018-04-27 RX ADMIN — HEPARIN SODIUM 5000 UNIT(S): 5000 INJECTION INTRAVENOUS; SUBCUTANEOUS at 13:35

## 2018-04-27 RX ADMIN — HEPARIN SODIUM 5000 UNIT(S): 5000 INJECTION INTRAVENOUS; SUBCUTANEOUS at 22:15

## 2018-04-27 RX ADMIN — HYDROMORPHONE HYDROCHLORIDE 0.5 MILLIGRAM(S): 2 INJECTION INTRAMUSCULAR; INTRAVENOUS; SUBCUTANEOUS at 01:46

## 2018-04-27 RX ADMIN — SENNA PLUS 2 TABLET(S): 8.6 TABLET ORAL at 22:15

## 2018-04-27 RX ADMIN — Medication 166.67 MILLIGRAM(S): at 13:35

## 2018-04-27 RX ADMIN — HYDROMORPHONE HYDROCHLORIDE 0.5 MILLIGRAM(S): 2 INJECTION INTRAMUSCULAR; INTRAVENOUS; SUBCUTANEOUS at 11:20

## 2018-04-27 RX ADMIN — SIMVASTATIN 40 MILLIGRAM(S): 20 TABLET, FILM COATED ORAL at 22:15

## 2018-04-27 RX ADMIN — HYDROMORPHONE HYDROCHLORIDE 0.5 MILLIGRAM(S): 2 INJECTION INTRAMUSCULAR; INTRAVENOUS; SUBCUTANEOUS at 18:19

## 2018-04-27 RX ADMIN — HEPARIN SODIUM 5000 UNIT(S): 5000 INJECTION INTRAVENOUS; SUBCUTANEOUS at 05:12

## 2018-04-27 RX ADMIN — Medication 81 MILLIGRAM(S): at 11:41

## 2018-04-27 RX ADMIN — OXYCODONE AND ACETAMINOPHEN 2 TABLET(S): 5; 325 TABLET ORAL at 14:05

## 2018-04-27 RX ADMIN — HYDROMORPHONE HYDROCHLORIDE 0.5 MILLIGRAM(S): 2 INJECTION INTRAMUSCULAR; INTRAVENOUS; SUBCUTANEOUS at 18:45

## 2018-04-27 RX ADMIN — Medication 6: at 11:41

## 2018-04-27 RX ADMIN — HYDROMORPHONE HYDROCHLORIDE 0.5 MILLIGRAM(S): 2 INJECTION INTRAMUSCULAR; INTRAVENOUS; SUBCUTANEOUS at 02:16

## 2018-04-27 RX ADMIN — Medication 100 MILLIGRAM(S): at 22:15

## 2018-04-27 RX ADMIN — HYDROMORPHONE HYDROCHLORIDE 0.5 MILLIGRAM(S): 2 INJECTION INTRAMUSCULAR; INTRAVENOUS; SUBCUTANEOUS at 11:35

## 2018-04-27 RX ADMIN — Medication 108 GRAM(S): at 17:29

## 2018-04-27 RX ADMIN — OXYCODONE AND ACETAMINOPHEN 2 TABLET(S): 5; 325 TABLET ORAL at 09:11

## 2018-04-27 RX ADMIN — OXYCODONE AND ACETAMINOPHEN 2 TABLET(S): 5; 325 TABLET ORAL at 08:41

## 2018-04-27 RX ADMIN — HYDROMORPHONE HYDROCHLORIDE 0.5 MILLIGRAM(S): 2 INJECTION INTRAMUSCULAR; INTRAVENOUS; SUBCUTANEOUS at 05:11

## 2018-04-27 RX ADMIN — GABAPENTIN 300 MILLIGRAM(S): 400 CAPSULE ORAL at 22:14

## 2018-04-27 RX ADMIN — Medication 25 MILLIGRAM(S): at 11:41

## 2018-04-27 RX ADMIN — OXYCODONE AND ACETAMINOPHEN 2 TABLET(S): 5; 325 TABLET ORAL at 18:04

## 2018-04-27 RX ADMIN — Medication 166.67 MILLIGRAM(S): at 01:00

## 2018-04-27 NOTE — PROGRESS NOTE ADULT - SUBJECTIVE AND OBJECTIVE BOX
O/N: ROXANN, VSS, trough 11                  Assessment/Plan:  68yFemale with chronic LLE lateral wound with persistent pain presents for surgical debridement and IV abx after multiple failed courses of PO antibiotics and previous hospital admission requiring IV Zosyn. She presents appearing non-toxic with no signs or symptoms of active infection, afebrile now s/p Debridement of chronic wound of left lower extremity lateral malleolus    Consistent Carb diet  - IV abx - previous cultures growing pseudomonas sensitive to Zosyn.  - Pain management recommendations initiated.  - PT evaluation  - OOB and ambulate.  - F/u OR cultures  - continue home medications   - ISS  - DVT ppx   - AM labs   -  wound VAC change today.   - D/c if pain controlled. O/N: ROXANN, VSS, trough 11      SUBJECTIVE: Patient seen and examined bedside by chief resident.  Patient reporting pain. Wound VAC in place with good suction.    aspirin enteric coated 81 milliGRAM(s) Oral daily  heparin  Injectable 5000 Unit(s) SubCutaneous every 8 hours  losartan 100 milliGRAM(s) Oral daily  piperacillin/tazobactam IVPB. 4.5 Gram(s) IV Intermittent every 6 hours  vancomycin  IVPB 1000 milliGRAM(s) IV Intermittent every 12 hours      Vital Signs Last 24 Hrs  T(C): 36.6 (27 Apr 2018 08:25), Max: 37 (26 Apr 2018 17:23)  T(F): 97.8 (27 Apr 2018 08:25), Max: 98.6 (26 Apr 2018 17:23)  HR: 53 (27 Apr 2018 08:25) (53 - 71)  BP: 104/73 (27 Apr 2018 08:25) (100/62 - 117/79)  BP(mean): --  RR: 17 (27 Apr 2018 08:25) (16 - 17)  SpO2: 98% (27 Apr 2018 08:25) (94% - 99%)  I&O's Detail    26 Apr 2018 07:01  -  27 Apr 2018 07:00  --------------------------------------------------------  IN:    Oral Fluid: 480 mL    Solution: 250 mL  Total IN: 730 mL    OUT:    VAC (Vacuum Assisted Closure) System: 80 mL    Voided: 1400 mL  Total OUT: 1480 mL    Total NET: -750 mL          General: NAD, resting comfortably in bed  C/V: NSR  Pulm: Nonlabored breathing, no respiratory distress  Abd: soft, NT/ND.  Extrem: WWP, no edema, SCDs in place        LABS:                        9.5    4.6   )-----------( 185      ( 27 Apr 2018 00:14 )             30.7     04-27    141  |  104  |  17  ----------------------------<  184<H>  4.1   |  28  |  1.09    Ca    9.1      27 Apr 2018 00:13  Phos  3.1     04-27  Mg     1.9     04-27            RADIOLOGY & ADDITIONAL STUDIES:              Assessment/Plan:  68yFemale with chronic LLE lateral wound with persistent pain presents for surgical debridement and IV abx after multiple failed courses of PO antibiotics and previous hospital admission requiring IV Zosyn. She presents appearing non-toxic with no signs or symptoms of active infection, afebrile now s/p Debridement of chronic wound of left lower extremity lateral malleolus    Consistent Carb diet  - IV abx - previous cultures growing pseudomonas sensitive to Zosyn.  - Pain management recommendations initiated.  - PT evaluation  - OOB and ambulate.  - F/u OR cultures  - continue home medications   - ISS  - DVT ppx   - AM labs   - D/c if pain controlled.

## 2018-04-27 NOTE — PROGRESS NOTE ADULT - SUBJECTIVE AND OBJECTIVE BOX
Pain Management Progress Note - [facility] [phone]    HPI: Patient seen at 08:40am. Patient laying down in bed. Patient in no apparent distress. Patient has wound vac to LLE. Patient continues to complain of pain at surgical site, pain is sharp, burning and achy that is sensitive to touch and limits patients Range of motion to extremity.       Pertinent PMH: Pain at: ___Back ___Neck___Knee ___Hip ___Shoulder ___ Opioid tolerance    Pain is __x_ sharp ____dull _x__burning _x__achy ___ Intensity: ____ mild _x___mod __x__severe     Location __x___surgical site _____cervical _____lumbar ____abd _____upper ext___x_ Left lower ext    Worse with ___x_activity __x__movement __x___physical therapy___ Rest    Improved with __x__medication _x___rest ____physical therapy    heparin  Injectable  insulin lispro (HumaLOG) corrective regimen sliding scale  dextrose 5%.  dextrose Gel  dextrose 50% Injectable  glucagon  Injectable  piperacillin/tazobactam IVPB.  oxyCODONE    5 mG/acetaminophen 325 mG  oxyCODONE    IR  losartan  gabapentin  simvastatin  senna  insulin lispro (HumaLOG) corrective regimen sliding scale  aspirin enteric coated  sodium chloride 0.9%.  acetaminophen   Tablet.  heparin  Injectable  HYDROmorphone  Injectable  aspirin enteric coated  ondansetron Injectable  docusate sodium  sodium chloride 0.9%.  piperacillin/tazobactam IVPB.  (ADM OVERRIDE)  piperacillin/tazobactam IVPB.  losartan  losartan  HYDROmorphone  Injectable  oxyCODONE    5 mG/acetaminophen 325 mG  magnesium oxide  vancomycin  IVPB  diphenhydrAMINE   Capsule  magnesium sulfate  IVPB      ROS: Const:  _-_febrile   Eyes:___ENT:___CV: _-_chest pain  Resp: _-__sob  GI:_-_nausea _-_vomiting __-_abd pain ___npo ___clears ___full diet __bm  :___ Musk: _x_pain _-_spasm  Skin:___ Neuro:  __-osyxmqcl_-_aesqtodjm___- numbness _x_weakness _x_paresthesia  Psych:__xanxiety  Endo:___ Heme:___Allergy:___  __x_all other systems reviewed and negative      04-27 @ 00:1353 mL/min/1.73M2<L>    Hemoglobin: 9.5 g/dL (04-27 @ 00:14)    T(C): 36.6 (04-27-18 @ 08:25), Max: 37 (04-26-18 @ 17:23)  HR: 53 (04-27-18 @ 08:25) (53 - 71)  BP: 104/73 (04-27-18 @ 08:25) (100/62 - 117/79)  RR: 17 (04-27-18 @ 08:25) (16 - 17)  SpO2: 98% (04-27-18 @ 08:25) (94% - 99%)  Wt(kg): --     PHYSICAL EXAM:  Gen Appearance: _x_no acute distress __xappropriate         Neuro: _x_SILT feet____ EOM Intact Psych: AAOX__3 _xmood/affect appropriate        Eyes: _x_conjunctiva WNL  _____ Pupils equal and round        ENT: _x_ears and nose atraumatic___ Hearing grossly intact        Neck: _x_trachea midline, no visible masses ___thyroid without palpable mass    Resp: _x_Nml WOB____No tactile fremitus ___clear to auscultation    Cardio: __xextremities free from edema ____pedal pulses palpable    GI/Abdomen: _x_soft ___x_ Nontender___x__Nondistended_____HSM    Lymphatic: ___no palpable nodes in neck  ___no palpable nodes calves and feet    Skin/Wound: _x_Incision, _x_Dressing c/d/i,   _x__surrounding tissues soft,  _x_wound vac present    Muscular: EHL __3/5  Gastrocnemius_3_/5    _x_absent clubbing/cyanosis         ASSESSMENT:  This is a 68y old Female with a history of:  SKIN ULCER DUE TOVARICOSE VEINS  No h/o HF  No pertinent family history in first degree relatives  Handoff  MEWS Score  Varicose vein of leg  Type 2 diabetes mellitus without complication, without long-term current use of insulin  Essential hypertension  Hyperlipidemia, unspecified hyperlipidemia type  Wound of left lower extremity  Wound of left lower extremity  Skin ulcer due to varicose veins  Debridement  No significant past surgical history  LEG PAIN  17        Recommended Treatment PLAN:  1. Recommend increasing gabapentin to 400mg Tid PO  2. Recommend increasing Percocet 2 tablets to Q3H Prn for moderate to severe pain  Plan discussed at bedside with Darius Small Pain Management Progress Note - [facility] [phone]    HPI: Patient seen at 08:40am. Patient laying down in bed. Patient in no apparent distress. Patient has wound vac to LLE. Patient continues to complain of pain at surgical site, pain is sharp, burning and achy that is sensitive to touch and limits patients Range of motion to extremity.       Pertinent PMH: Pain at: ___Back ___Neck___Knee ___Hip ___Shoulder ___ Opioid tolerance    Pain is __x_ sharp ____dull _x__burning _x__achy ___ Intensity: ____ mild _x___mod __x__severe     Location __x___surgical site _____cervical _____lumbar ____abd _____upper ext___x_ Left lower ext    Worse with ___x_activity __x__movement __x___physical therapy___ Rest    Improved with __x__medication _x___rest ____physical therapy    heparin  Injectable  insulin lispro (HumaLOG) corrective regimen sliding scale  dextrose 5%.  dextrose Gel  dextrose 50% Injectable  glucagon  Injectable  piperacillin/tazobactam IVPB.  oxyCODONE    5 mG/acetaminophen 325 mG  oxyCODONE    IR  losartan  gabapentin  simvastatin  senna  insulin lispro (HumaLOG) corrective regimen sliding scale  aspirin enteric coated  sodium chloride 0.9%.  acetaminophen   Tablet.  heparin  Injectable  HYDROmorphone  Injectable  aspirin enteric coated  ondansetron Injectable  docusate sodium  sodium chloride 0.9%.  piperacillin/tazobactam IVPB.  piperacillin/tazobactam IVPB.  losartan  HYDROmorphone  Injectable  oxyCODONE    5 mG/acetaminophen 325 mG  magnesium oxide  vancomycin  IVPB  diphenhydrAMINE   Capsule  magnesium sulfate  IVPB      ROS: Const:  _-_febrile   Eyes:___ENT:___CV: _-_chest pain  Resp: _-__sob  GI:_-_nausea _-_vomiting __-_abd pain ___npo ___clears ___full diet __bm  :___ Musk: _x_pain _-_spasm  Skin:___ Neuro:  __-__locwtqcf_-_lqrczvhuk___- numbness _x__weakness _x_paresthesia  Psych:__x___anxiety  Endo:___ Heme:___Allergy:___  __x_all other systems reviewed and negative      04-27 @ 00:1353 mL/min/1.73M2<L>    Hemoglobin: 9.5 g/dL (04-27 @ 00:14)    T(C): 36.6 (04-27-18 @ 08:25), Max: 37 (04-26-18 @ 17:23)  HR: 53 (04-27-18 @ 08:25) (53 - 71)  BP: 104/73 (04-27-18 @ 08:25) (100/62 - 117/79)  RR: 17 (04-27-18 @ 08:25) (16 - 17)  SpO2: 98% (04-27-18 @ 08:25) (94% - 99%)  Wt(kg): --     PHYSICAL EXAM:  Gen Appearance: _x_no acute distress __xappropriate         Neuro: _x_SILT feet____ EOM Intact Psych: AAOX__3 _x__mood/affect appropriate        Eyes: _x_conjunctiva WNL  _____ Pupils equal and round        ENT: _x_ears and nose atraumatic___ Hearing grossly intact        Neck: _x_trachea midline, no visible masses ___thyroid without palpable mass    Resp: _x_Nml WOB____No tactile fremitus ___clear to auscultation    Cardio: __xextremities free from edema ____pedal pulses palpable    GI/Abdomen: _x_soft ___x_ Nontender___x__Nondistended_____HSM    Lymphatic: ___no palpable nodes in neck  ___no palpable nodes calves and feet    Skin/Wound: _x_Incision, _x_Dressing c/d/i,   _x__surrounding tissues soft,  _x_wound vac present    Muscular: EHL __3/5  Gastrocnemius_3_/5    _x_absent clubbing/cyanosis         ASSESSMENT:  This is a 68y old Female with a history of:  SKIN ULCER DUE TOVARICOSE VEINS  No h/o HF  No pertinent family history in first degree relatives  Handoff  MEWS Score  Varicose vein of leg  Type 2 diabetes mellitus without complication, without long-term current use of insulin  Essential hypertension  Hyperlipidemia, unspecified hyperlipidemia type  Wound of left lower extremity  Wound of left lower extremity  Skin ulcer due to varicose veins  Debridement  No significant past surgical history  LEG PAIN  17        Recommended Treatment PLAN:  1. Recommend increasing gabapentin to 400mg Tid PO  2. Recommend increasing Percocet 2 tablets to Q3H Prn for moderate to severe pain  Plan discussed at bedside with Darius Small

## 2018-04-28 LAB
GLUCOSE BLDC GLUCOMTR-MCNC: 180 MG/DL — HIGH (ref 70–99)
GLUCOSE BLDC GLUCOMTR-MCNC: 217 MG/DL — HIGH (ref 70–99)
GLUCOSE BLDC GLUCOMTR-MCNC: 240 MG/DL — HIGH (ref 70–99)
GLUCOSE BLDC GLUCOMTR-MCNC: 279 MG/DL — HIGH (ref 70–99)

## 2018-04-28 RX ORDER — DIPHENHYDRAMINE HCL 50 MG
25 CAPSULE ORAL ONCE
Qty: 0 | Refills: 0 | Status: COMPLETED | OUTPATIENT
Start: 2018-04-28 | End: 2018-04-28

## 2018-04-28 RX ORDER — GABAPENTIN 400 MG/1
400 CAPSULE ORAL EVERY 8 HOURS
Qty: 0 | Refills: 0 | Status: DISCONTINUED | OUTPATIENT
Start: 2018-04-28 | End: 2018-05-02

## 2018-04-28 RX ADMIN — Medication 6: at 13:07

## 2018-04-28 RX ADMIN — OXYCODONE AND ACETAMINOPHEN 2 TABLET(S): 5; 325 TABLET ORAL at 22:30

## 2018-04-28 RX ADMIN — SENNA PLUS 2 TABLET(S): 8.6 TABLET ORAL at 21:56

## 2018-04-28 RX ADMIN — HYDROMORPHONE HYDROCHLORIDE 0.5 MILLIGRAM(S): 2 INJECTION INTRAMUSCULAR; INTRAVENOUS; SUBCUTANEOUS at 03:23

## 2018-04-28 RX ADMIN — OXYCODONE AND ACETAMINOPHEN 2 TABLET(S): 5; 325 TABLET ORAL at 05:32

## 2018-04-28 RX ADMIN — OXYCODONE AND ACETAMINOPHEN 2 TABLET(S): 5; 325 TABLET ORAL at 11:22

## 2018-04-28 RX ADMIN — HYDROMORPHONE HYDROCHLORIDE 0.5 MILLIGRAM(S): 2 INJECTION INTRAMUSCULAR; INTRAVENOUS; SUBCUTANEOUS at 19:54

## 2018-04-28 RX ADMIN — OXYCODONE AND ACETAMINOPHEN 2 TABLET(S): 5; 325 TABLET ORAL at 21:56

## 2018-04-28 RX ADMIN — SIMVASTATIN 40 MILLIGRAM(S): 20 TABLET, FILM COATED ORAL at 21:57

## 2018-04-28 RX ADMIN — Medication 108 GRAM(S): at 11:24

## 2018-04-28 RX ADMIN — Medication 100 MILLIGRAM(S): at 13:07

## 2018-04-28 RX ADMIN — HYDROMORPHONE HYDROCHLORIDE 0.5 MILLIGRAM(S): 2 INJECTION INTRAMUSCULAR; INTRAVENOUS; SUBCUTANEOUS at 13:30

## 2018-04-28 RX ADMIN — HEPARIN SODIUM 5000 UNIT(S): 5000 INJECTION INTRAVENOUS; SUBCUTANEOUS at 21:57

## 2018-04-28 RX ADMIN — Medication 108 GRAM(S): at 23:38

## 2018-04-28 RX ADMIN — OXYCODONE AND ACETAMINOPHEN 2 TABLET(S): 5; 325 TABLET ORAL at 00:54

## 2018-04-28 RX ADMIN — Medication 100 MILLIGRAM(S): at 05:31

## 2018-04-28 RX ADMIN — HYDROMORPHONE HYDROCHLORIDE 0.5 MILLIGRAM(S): 2 INJECTION INTRAMUSCULAR; INTRAVENOUS; SUBCUTANEOUS at 13:06

## 2018-04-28 RX ADMIN — Medication 4: at 17:06

## 2018-04-28 RX ADMIN — Medication 2: at 08:12

## 2018-04-28 RX ADMIN — OXYCODONE AND ACETAMINOPHEN 2 TABLET(S): 5; 325 TABLET ORAL at 12:30

## 2018-04-28 RX ADMIN — GABAPENTIN 400 MILLIGRAM(S): 400 CAPSULE ORAL at 21:57

## 2018-04-28 RX ADMIN — HEPARIN SODIUM 5000 UNIT(S): 5000 INJECTION INTRAVENOUS; SUBCUTANEOUS at 13:07

## 2018-04-28 RX ADMIN — GABAPENTIN 400 MILLIGRAM(S): 400 CAPSULE ORAL at 13:07

## 2018-04-28 RX ADMIN — Medication 81 MILLIGRAM(S): at 11:24

## 2018-04-28 RX ADMIN — OXYCODONE AND ACETAMINOPHEN 2 TABLET(S): 5; 325 TABLET ORAL at 18:00

## 2018-04-28 RX ADMIN — LOSARTAN POTASSIUM 100 MILLIGRAM(S): 100 TABLET, FILM COATED ORAL at 05:31

## 2018-04-28 RX ADMIN — Medication 25 MILLIGRAM(S): at 21:57

## 2018-04-28 RX ADMIN — Medication 108 GRAM(S): at 17:07

## 2018-04-28 RX ADMIN — HEPARIN SODIUM 5000 UNIT(S): 5000 INJECTION INTRAVENOUS; SUBCUTANEOUS at 05:31

## 2018-04-28 RX ADMIN — OXYCODONE AND ACETAMINOPHEN 2 TABLET(S): 5; 325 TABLET ORAL at 06:02

## 2018-04-28 RX ADMIN — Medication 100 MILLIGRAM(S): at 21:56

## 2018-04-28 RX ADMIN — OXYCODONE AND ACETAMINOPHEN 2 TABLET(S): 5; 325 TABLET ORAL at 00:24

## 2018-04-28 RX ADMIN — GABAPENTIN 300 MILLIGRAM(S): 400 CAPSULE ORAL at 05:31

## 2018-04-28 RX ADMIN — OXYCODONE AND ACETAMINOPHEN 2 TABLET(S): 5; 325 TABLET ORAL at 17:07

## 2018-04-28 RX ADMIN — Medication 108 GRAM(S): at 05:31

## 2018-04-28 RX ADMIN — HYDROMORPHONE HYDROCHLORIDE 0.5 MILLIGRAM(S): 2 INJECTION INTRAMUSCULAR; INTRAVENOUS; SUBCUTANEOUS at 19:20

## 2018-04-28 RX ADMIN — Medication 4: at 21:57

## 2018-04-28 NOTE — PROGRESS NOTE ADULT - SUBJECTIVE AND OBJECTIVE BOX
ovn: pain controlled. NIKO HACKETT  4/27: wound VAC changed          68yFemale with chronic LLE lateral wound with persistent pain presents for surgical debridement and IV abx after multiple failed courses of PO antibiotics and previous hospital admission requiring IV Zosyn. She presents appearing non-toxic with no signs or symptoms of active infection, afebrile now s/p Debridement of chronic wound of left lower extremity lateral malleolus    Consistent Carb diet  - IV abx - previous cultures growing pseudomonas sensitive to Zosyn.  - Pain management recommendations initiated.  - PT evaluation  - OOB and ambulate.  - F/u OR cultures  - continue home medications   - ISS  - DVT ppx   - AM labs   - Replace wound VAC today.   - D/c if pain controlled. ovn: pain controlled. ROXANN, VSS  4/27: wound VAC changed    Complained of LLE pain at site of debridement. Also complained of painful vac change yesterday. Will try increasing pain medication dose for next change. No other issues.     PMH:  Varicose vein of leg  Type 2 diabetes mellitus without complication, without long-term current use of insulin  Essential hypertension  Hyperlipidemia, unspecified hyperlipidemia type      Medication:   ampicillin  IVPB 1  ampicillin  IVPB 1  aspirin enteric coated 81  heparin  Injectable 5000  losartan 100        Vital Signs Last 24 Hrs  T(C): 36.7 (28 Apr 2018 09:52), Max: 36.7 (27 Apr 2018 14:51)  T(F): 98.1 (28 Apr 2018 09:52), Max: 98.1 (28 Apr 2018 09:52)  HR: 61 (28 Apr 2018 09:52) (50 - 61)  BP: 110/62 (28 Apr 2018 09:52) (105/54 - 126/76)  BP(mean): --  RR: 17 (28 Apr 2018 09:52) (16 - 17)  SpO2: 98% (28 Apr 2018 09:52) (96% - 98%)  I&O's Summary    27 Apr 2018 07:01  -  28 Apr 2018 07:00  --------------------------------------------------------  IN: 1190 mL / OUT: 1800 mL / NET: -610 mL    28 Apr 2018 07:01  -  28 Apr 2018 12:38  --------------------------------------------------------  IN: 420 mL / OUT: 600 mL / NET: -180 mL        Physical Exam:  General: NAD, resting comfortably in bed  C/V: NSR  Pulm: Nonlabored breathing, no respiratory distress  Abd: soft, NT/ND.  Extrem: WWP, no edema, SCDs in place, vac in place and functioning      LABS:                        9.5    4.6   )-----------( 185      ( 27 Apr 2018 00:14 )             30.7     04-27    141  |  104  |  17  ----------------------------<  184<H>  4.1   |  28  |  1.09    Ca    9.1      27 Apr 2018 00:13  Phos  3.1     04-27  Mg     1.9     04-27          Radiology and Additional Studies:          68yFemaleileen with chronic LLE lateral wound with persistent pain presents for surgical debridement and IV abx after multiple failed courses of PO antibiotics and previous hospital admission requiring IV Zosyn. She presents appearing non-toxic with no signs or symptoms of active infection, afebrile now s/p Debridement of chronic wound of left lower extremity lateral malleolus    - Consistent Carb diet  - cont. ampicillin  - Pain management recommendations initiated.  - PT evaluation  - OOB and ambulate.  - F/u OR cultures  - continue home medications   - ISS  - DVT ppx   - AM labs   - Dispo plan: CAROLINE with Augmentin

## 2018-04-29 LAB
APPEARANCE UR: CLEAR — SIGNIFICANT CHANGE UP
BILIRUB UR-MCNC: NEGATIVE — SIGNIFICANT CHANGE UP
COLOR SPEC: YELLOW — SIGNIFICANT CHANGE UP
DIFF PNL FLD: (no result)
GLUCOSE BLDC GLUCOMTR-MCNC: 197 MG/DL — HIGH (ref 70–99)
GLUCOSE BLDC GLUCOMTR-MCNC: 281 MG/DL — HIGH (ref 70–99)
GLUCOSE BLDC GLUCOMTR-MCNC: 284 MG/DL — HIGH (ref 70–99)
GLUCOSE BLDC GLUCOMTR-MCNC: 316 MG/DL — HIGH (ref 70–99)
GLUCOSE BLDC GLUCOMTR-MCNC: 356 MG/DL — HIGH (ref 70–99)
GLUCOSE UR QL: 500
KETONES UR-MCNC: NEGATIVE — SIGNIFICANT CHANGE UP
LEUKOCYTE ESTERASE UR-ACNC: NEGATIVE — SIGNIFICANT CHANGE UP
NITRITE UR-MCNC: NEGATIVE — SIGNIFICANT CHANGE UP
PH UR: 5.5 — SIGNIFICANT CHANGE UP (ref 5–8)
PROT UR-MCNC: NEGATIVE MG/DL — SIGNIFICANT CHANGE UP
SP GR SPEC: 1.02 — SIGNIFICANT CHANGE UP (ref 1–1.03)
UROBILINOGEN FLD QL: 0.2 E.U./DL — SIGNIFICANT CHANGE UP

## 2018-04-29 RX ORDER — POLYETHYLENE GLYCOL 3350 17 G/17G
17 POWDER, FOR SOLUTION ORAL ONCE
Qty: 0 | Refills: 0 | Status: COMPLETED | OUTPATIENT
Start: 2018-04-29 | End: 2018-04-29

## 2018-04-29 RX ADMIN — Medication 108 GRAM(S): at 17:24

## 2018-04-29 RX ADMIN — GABAPENTIN 400 MILLIGRAM(S): 400 CAPSULE ORAL at 05:55

## 2018-04-29 RX ADMIN — Medication 2: at 08:12

## 2018-04-29 RX ADMIN — POLYETHYLENE GLYCOL 3350 17 GRAM(S): 17 POWDER, FOR SOLUTION ORAL at 15:19

## 2018-04-29 RX ADMIN — Medication 8: at 17:33

## 2018-04-29 RX ADMIN — Medication 25 MILLIGRAM(S): at 17:40

## 2018-04-29 RX ADMIN — Medication 100 MILLIGRAM(S): at 13:02

## 2018-04-29 RX ADMIN — Medication 108 GRAM(S): at 12:15

## 2018-04-29 RX ADMIN — HEPARIN SODIUM 5000 UNIT(S): 5000 INJECTION INTRAVENOUS; SUBCUTANEOUS at 13:02

## 2018-04-29 RX ADMIN — Medication 81 MILLIGRAM(S): at 10:47

## 2018-04-29 RX ADMIN — OXYCODONE AND ACETAMINOPHEN 2 TABLET(S): 5; 325 TABLET ORAL at 15:49

## 2018-04-29 RX ADMIN — OXYCODONE AND ACETAMINOPHEN 2 TABLET(S): 5; 325 TABLET ORAL at 05:55

## 2018-04-29 RX ADMIN — OXYCODONE AND ACETAMINOPHEN 2 TABLET(S): 5; 325 TABLET ORAL at 11:30

## 2018-04-29 RX ADMIN — HYDROMORPHONE HYDROCHLORIDE 0.5 MILLIGRAM(S): 2 INJECTION INTRAMUSCULAR; INTRAVENOUS; SUBCUTANEOUS at 08:15

## 2018-04-29 RX ADMIN — OXYCODONE AND ACETAMINOPHEN 2 TABLET(S): 5; 325 TABLET ORAL at 15:19

## 2018-04-29 RX ADMIN — GABAPENTIN 400 MILLIGRAM(S): 400 CAPSULE ORAL at 22:18

## 2018-04-29 RX ADMIN — Medication 6: at 22:18

## 2018-04-29 RX ADMIN — HEPARIN SODIUM 5000 UNIT(S): 5000 INJECTION INTRAVENOUS; SUBCUTANEOUS at 22:18

## 2018-04-29 RX ADMIN — OXYCODONE AND ACETAMINOPHEN 2 TABLET(S): 5; 325 TABLET ORAL at 10:46

## 2018-04-29 RX ADMIN — Medication 100 MILLIGRAM(S): at 22:18

## 2018-04-29 RX ADMIN — HYDROMORPHONE HYDROCHLORIDE 0.5 MILLIGRAM(S): 2 INJECTION INTRAMUSCULAR; INTRAVENOUS; SUBCUTANEOUS at 21:00

## 2018-04-29 RX ADMIN — HYDROMORPHONE HYDROCHLORIDE 0.5 MILLIGRAM(S): 2 INJECTION INTRAMUSCULAR; INTRAVENOUS; SUBCUTANEOUS at 20:45

## 2018-04-29 RX ADMIN — Medication 6: at 12:59

## 2018-04-29 RX ADMIN — HEPARIN SODIUM 5000 UNIT(S): 5000 INJECTION INTRAVENOUS; SUBCUTANEOUS at 05:55

## 2018-04-29 RX ADMIN — Medication 108 GRAM(S): at 05:55

## 2018-04-29 RX ADMIN — HYDROMORPHONE HYDROCHLORIDE 0.5 MILLIGRAM(S): 2 INJECTION INTRAMUSCULAR; INTRAVENOUS; SUBCUTANEOUS at 08:30

## 2018-04-29 RX ADMIN — GABAPENTIN 400 MILLIGRAM(S): 400 CAPSULE ORAL at 13:02

## 2018-04-29 RX ADMIN — Medication 100 MILLIGRAM(S): at 05:55

## 2018-04-29 RX ADMIN — SIMVASTATIN 40 MILLIGRAM(S): 20 TABLET, FILM COATED ORAL at 22:18

## 2018-04-29 RX ADMIN — LOSARTAN POTASSIUM 100 MILLIGRAM(S): 100 TABLET, FILM COATED ORAL at 05:55

## 2018-04-29 RX ADMIN — OXYCODONE AND ACETAMINOPHEN 2 TABLET(S): 5; 325 TABLET ORAL at 08:00

## 2018-04-29 RX ADMIN — SENNA PLUS 2 TABLET(S): 8.6 TABLET ORAL at 22:18

## 2018-04-29 NOTE — PROGRESS NOTE ADULT - ASSESSMENT
68yFemale with chronic LLE lateral wound with persistent pain presents for surgical debridement and IV abx after multiple failed courses of PO antibiotics and previous hospital admission requiring IV Zosyn. She presents appearing non-toxic with no signs or symptoms of active infection, afebrile now s/p Debridement of chronic wound of left lower extremity lateral malleolus    - Consistent Carb diet  - cont. ampicillin  - Pain management recommendations initiated.  - PT evaluation  - OOB and ambulate.  - F/u OR cultures  - continue home medications   - ISS  - DVT ppx   - AM labs   - Dispo plan: CAROLINE with Augmentin

## 2018-04-30 LAB
ANION GAP SERPL CALC-SCNC: 10 MMOL/L — SIGNIFICANT CHANGE UP (ref 5–17)
BUN SERPL-MCNC: 10 MG/DL — SIGNIFICANT CHANGE UP (ref 7–23)
CALCIUM SERPL-MCNC: 9.4 MG/DL — SIGNIFICANT CHANGE UP (ref 8.4–10.5)
CHLORIDE SERPL-SCNC: 102 MMOL/L — SIGNIFICANT CHANGE UP (ref 96–108)
CO2 SERPL-SCNC: 28 MMOL/L — SIGNIFICANT CHANGE UP (ref 22–31)
CREAT SERPL-MCNC: 0.73 MG/DL — SIGNIFICANT CHANGE UP (ref 0.5–1.3)
GLUCOSE BLDC GLUCOMTR-MCNC: 209 MG/DL — HIGH (ref 70–99)
GLUCOSE BLDC GLUCOMTR-MCNC: 219 MG/DL — HIGH (ref 70–99)
GLUCOSE BLDC GLUCOMTR-MCNC: 230 MG/DL — HIGH (ref 70–99)
GLUCOSE BLDC GLUCOMTR-MCNC: 330 MG/DL — HIGH (ref 70–99)
GLUCOSE SERPL-MCNC: 197 MG/DL — HIGH (ref 70–99)
HCT VFR BLD CALC: 32 % — LOW (ref 34.5–45)
HGB BLD-MCNC: 9.8 G/DL — LOW (ref 11.5–15.5)
MAGNESIUM SERPL-MCNC: 1.8 MG/DL — SIGNIFICANT CHANGE UP (ref 1.6–2.6)
MCHC RBC-ENTMCNC: 28.2 PG — SIGNIFICANT CHANGE UP (ref 27–34)
MCHC RBC-ENTMCNC: 30.6 G/DL — LOW (ref 32–36)
MCV RBC AUTO: 92 FL — SIGNIFICANT CHANGE UP (ref 80–100)
PHOSPHATE SERPL-MCNC: 3.2 MG/DL — SIGNIFICANT CHANGE UP (ref 2.5–4.5)
PLATELET # BLD AUTO: 172 K/UL — SIGNIFICANT CHANGE UP (ref 150–400)
POTASSIUM SERPL-MCNC: 4.4 MMOL/L — SIGNIFICANT CHANGE UP (ref 3.5–5.3)
POTASSIUM SERPL-SCNC: 4.4 MMOL/L — SIGNIFICANT CHANGE UP (ref 3.5–5.3)
RBC # BLD: 3.48 M/UL — LOW (ref 3.8–5.2)
RBC # FLD: 15.5 % — SIGNIFICANT CHANGE UP (ref 10.3–16.9)
SODIUM SERPL-SCNC: 140 MMOL/L — SIGNIFICANT CHANGE UP (ref 135–145)
WBC # BLD: 4.2 K/UL — SIGNIFICANT CHANGE UP (ref 3.8–10.5)
WBC # FLD AUTO: 4.2 K/UL — SIGNIFICANT CHANGE UP (ref 3.8–10.5)

## 2018-04-30 RX ORDER — HYDROMORPHONE HYDROCHLORIDE 2 MG/ML
0.5 INJECTION INTRAMUSCULAR; INTRAVENOUS; SUBCUTANEOUS ONCE
Qty: 0 | Refills: 0 | Status: DISCONTINUED | OUTPATIENT
Start: 2018-04-30 | End: 2018-04-30

## 2018-04-30 RX ORDER — INSULIN GLARGINE 100 [IU]/ML
8 INJECTION, SOLUTION SUBCUTANEOUS ONCE
Qty: 0 | Refills: 0 | Status: COMPLETED | OUTPATIENT
Start: 2018-04-30 | End: 2018-04-30

## 2018-04-30 RX ORDER — HYDROMORPHONE HYDROCHLORIDE 2 MG/ML
0.5 INJECTION INTRAMUSCULAR; INTRAVENOUS; SUBCUTANEOUS ONCE
Qty: 0 | Refills: 0 | Status: DISCONTINUED | OUTPATIENT
Start: 2018-04-30 | End: 2018-05-02

## 2018-04-30 RX ADMIN — HEPARIN SODIUM 5000 UNIT(S): 5000 INJECTION INTRAVENOUS; SUBCUTANEOUS at 06:06

## 2018-04-30 RX ADMIN — OXYCODONE AND ACETAMINOPHEN 2 TABLET(S): 5; 325 TABLET ORAL at 13:28

## 2018-04-30 RX ADMIN — HEPARIN SODIUM 5000 UNIT(S): 5000 INJECTION INTRAVENOUS; SUBCUTANEOUS at 13:28

## 2018-04-30 RX ADMIN — HYDROMORPHONE HYDROCHLORIDE 0.5 MILLIGRAM(S): 2 INJECTION INTRAMUSCULAR; INTRAVENOUS; SUBCUTANEOUS at 21:20

## 2018-04-30 RX ADMIN — HYDROMORPHONE HYDROCHLORIDE 0.5 MILLIGRAM(S): 2 INJECTION INTRAMUSCULAR; INTRAVENOUS; SUBCUTANEOUS at 20:50

## 2018-04-30 RX ADMIN — HYDROMORPHONE HYDROCHLORIDE 0.5 MILLIGRAM(S): 2 INJECTION INTRAMUSCULAR; INTRAVENOUS; SUBCUTANEOUS at 10:41

## 2018-04-30 RX ADMIN — Medication 4: at 08:20

## 2018-04-30 RX ADMIN — Medication 25 MILLIGRAM(S): at 12:14

## 2018-04-30 RX ADMIN — HYDROMORPHONE HYDROCHLORIDE 0.5 MILLIGRAM(S): 2 INJECTION INTRAMUSCULAR; INTRAVENOUS; SUBCUTANEOUS at 09:01

## 2018-04-30 RX ADMIN — Medication 4: at 22:03

## 2018-04-30 RX ADMIN — Medication 100 MILLIGRAM(S): at 13:28

## 2018-04-30 RX ADMIN — HYDROMORPHONE HYDROCHLORIDE 0.5 MILLIGRAM(S): 2 INJECTION INTRAMUSCULAR; INTRAVENOUS; SUBCUTANEOUS at 12:51

## 2018-04-30 RX ADMIN — Medication 4: at 12:13

## 2018-04-30 RX ADMIN — OXYCODONE AND ACETAMINOPHEN 2 TABLET(S): 5; 325 TABLET ORAL at 06:45

## 2018-04-30 RX ADMIN — OXYCODONE AND ACETAMINOPHEN 2 TABLET(S): 5; 325 TABLET ORAL at 06:15

## 2018-04-30 RX ADMIN — SIMVASTATIN 40 MILLIGRAM(S): 20 TABLET, FILM COATED ORAL at 22:02

## 2018-04-30 RX ADMIN — HEPARIN SODIUM 5000 UNIT(S): 5000 INJECTION INTRAVENOUS; SUBCUTANEOUS at 22:02

## 2018-04-30 RX ADMIN — Medication 108 GRAM(S): at 00:08

## 2018-04-30 RX ADMIN — OXYCODONE AND ACETAMINOPHEN 2 TABLET(S): 5; 325 TABLET ORAL at 14:15

## 2018-04-30 RX ADMIN — Medication 108 GRAM(S): at 06:07

## 2018-04-30 RX ADMIN — OXYCODONE AND ACETAMINOPHEN 2 TABLET(S): 5; 325 TABLET ORAL at 20:42

## 2018-04-30 RX ADMIN — Medication 100 MILLIGRAM(S): at 06:06

## 2018-04-30 RX ADMIN — GABAPENTIN 400 MILLIGRAM(S): 400 CAPSULE ORAL at 22:02

## 2018-04-30 RX ADMIN — GABAPENTIN 400 MILLIGRAM(S): 400 CAPSULE ORAL at 06:06

## 2018-04-30 RX ADMIN — Medication 108 GRAM(S): at 12:13

## 2018-04-30 RX ADMIN — GABAPENTIN 400 MILLIGRAM(S): 400 CAPSULE ORAL at 13:29

## 2018-04-30 RX ADMIN — OXYCODONE AND ACETAMINOPHEN 2 TABLET(S): 5; 325 TABLET ORAL at 19:42

## 2018-04-30 RX ADMIN — Medication 8: at 17:16

## 2018-04-30 RX ADMIN — LOSARTAN POTASSIUM 100 MILLIGRAM(S): 100 TABLET, FILM COATED ORAL at 06:07

## 2018-04-30 RX ADMIN — Medication 81 MILLIGRAM(S): at 12:14

## 2018-04-30 RX ADMIN — HYDROMORPHONE HYDROCHLORIDE 0.5 MILLIGRAM(S): 2 INJECTION INTRAMUSCULAR; INTRAVENOUS; SUBCUTANEOUS at 09:18

## 2018-04-30 RX ADMIN — Medication 108 GRAM(S): at 17:16

## 2018-04-30 NOTE — PROGRESS NOTE ADULT - SUBJECTIVE AND OBJECTIVE BOX
Pain Management Progress Note - [facility] [phone]  Patient was seen at 08:50.  HPI:  This is a 68y old Female with a history of DMII, Varicose vein of leg, cellulitis in LE s/p wound debridement, VAC placement on 4/24/18. Patient states that her pain is managed well with current regimen but c/o constipation for a week.    Pertinent PMH: Pain at: _x__LE ___Neck___Knee ___Hip ___Shoulder ___ Opioid tolerance    Pain is ___ sharp ____dull ___burning _x__achy ___ Intensity: ____ mild _x___mod ____severe     Location ___x__surgical site _____cervical _____lumbar ____abd _____upper ext__x__lower ext    Worse with ___x_activity _x___movement _____physical therapy___ Rest    Improved with __x__medication ____rest ____physical therapy    heparin  Injectable  insulin lispro (HumaLOG) corrective regimen sliding scale  dextrose 5%.  dextrose Gel  dextrose 50% Injectable  dextrose 50% Injectable  dextrose 50% Injectable  glucagon  Injectable  piperacillin/tazobactam IVPB.  oxyCODONE    5 mG/acetaminophen 325 mG  oxyCODONE    5 mG/acetaminophen 325 mG  oxyCODONE    IR  losartan  gabapentin  simvastatin  senna  insulin lispro (HumaLOG) corrective regimen sliding scale  aspirin enteric coated  sodium chloride 0.9%.  acetaminophen   Tablet.  heparin  Injectable  HYDROmorphone  Injectable  HYDROmorphone  Injectable  aspirin enteric coated  ondansetron Injectable  docusate sodium  sodium chloride 0.9%.  piperacillin/tazobactam IVPB.  (ADM OVERRIDE)  piperacillin/tazobactam IVPB.  losartan  losartan  HYDROmorphone  Injectable  oxyCODONE    5 mG/acetaminophen 325 mG  magnesium oxide  vancomycin  IVPB  diphenhydrAMINE   Capsule  magnesium sulfate  IVPB  ampicillin  IVPB  HYDROmorphone  Injectable  gabapentin  diphenhydrAMINE   Capsule  polyethylene glycol 3350  HYDROmorphone  Injectable  HYDROmorphone  Injectable  (ADM OVERRIDE)      ROS: Const:  _-__febrile   Eyes:___ENT:___CV: __-_chest pain  Resp: _-___sob  GI:_-__nausea _-__vomiting ____abd pain ___npo ___clears ___full diet __bm  :___ Musk: _+__pain _+__spasm  Skin:___ Neuro:  _-__sosglgdi__-_kzicjsgfe_-___ numbness _-__weakness ___paresthesia  Psych:___anxiety  Endo:___ Heme:___Allergy:___    04-30 @ 06:0986 mL/min/1.73M2  Hemoglobin: 9.8 g/dL (04-30 @ 06:09)    T(C): 36.5 (04-30-18 @ 13:40), Max: 37.1 (04-29-18 @ 20:10)  HR: 69 (04-30-18 @ 13:40) (63 - 69)  BP: 131/68 (04-30-18 @ 13:40) (110/76 - 153/81)  RR: 18 (04-30-18 @ 13:40) (16 - 18)  SpO2: 99% (04-30-18 @ 13:40) (97% - 99%)  Wt(kg): --     PHYSICAL EXAM:  Gen Appearance: _x__no acute distress ___appropriate         Neuro: _x__SILT feet____ EOM Intact Psych: AAOX__, ___mood/affect appropriate        Eyes: _x__conjunctiva WNL  _____ Pupils equal and round        ENT: _x__ears and nose atraumatic___ Hearing grossly intact        Neck: __x_trachea midline, no visible masses ___thyroid without palpable mass    Resp: _x__Nml WOB____No tactile fremitus ___clear to auscultation    Cardio: __x_extremities free from edema ____pedal pulses palpable    GI/Abdomen: __x_soft ___x__ Nontender______Nondistended_____HSM    Lymphatic: ___no palpable nodes in neck  ___no palpable nodes calves and feet    Skin/Wound: ___Incision, _x__Dressing c/d/i,   ____surrounding tissues soft,  __x_drain/chest tube present    Muscular: EHL 5_/5  Gastrocnemius_5__/5    _x__absent clubbing/cyanosis         ASSESSMENT:    This is a 68y old Female with a history of DMII, Varicose vein of leg, cellulitis in LE s/p wound debridement, VAC placement on 4/24/18 and her pain is managed well with current regimen but c/o constipation for a week.    Recommended Treatment PLAN:  1. Continue current pain regimen  2. Consider aggressive bowel regimen for constipation

## 2018-04-30 NOTE — PROGRESS NOTE ADULT - SUBJECTIVE AND OBJECTIVE BOX
24hr Events:  O/N: VSS, ROXANN  4/29: f/u ua and ucx for burning and itching; refused to work with PT again today      Assessment/Plan:  68yFemale with chronic LLE lateral wound with persistent pain presents for surgical debridement and IV abx after multiple failed courses of PO antibiotics and previous hospital admission requiring IV Zosyn. She presents appearing non-toxic with no signs or symptoms of active infection, afebrile now s/p Debridement of chronic wound of left lower extremity lateral malleolus    - Consistent Carb diet  - cont. ampicillin  - Pain management recommendations initiated.  - PT evaluation  - OOB and ambulate.  - F/u OR cultures  - continue home medications   - ISS  - DVT ppx   - AM labs   - Dispo plan: CAROLINE with Augmentin 24hr Events:  O/N: VSS, ROXANN  4/29: f/u ua and ucx for burning and itching; refused to work with PT again today    ampicillin  IVPB 1  ampicillin  IVPB 1  aspirin enteric coated 81  heparin  Injectable 5000  losartan 100        Vital Signs Last 24 Hrs  T(C): 36.3 (30 Apr 2018 05:50), Max: 37.1 (29 Apr 2018 20:10)  T(F): 97.4 (30 Apr 2018 05:50), Max: 98.7 (29 Apr 2018 20:10)  HR: 63 (30 Apr 2018 05:50) (52 - 66)  BP: 135/69 (30 Apr 2018 05:50) (109/70 - 135/69)  BP(mean): --  RR: 17 (30 Apr 2018 05:50) (16 - 17)  SpO2: 99% (30 Apr 2018 05:50) (94% - 99%)  I&O's Summary    29 Apr 2018 07:01  -  30 Apr 2018 07:00  --------------------------------------------------------  IN: 360 mL / OUT: 2050 mL / NET: -1690 mL        Physical Exam:  General: NAD, resting comfortably in bed  Pulmonary: Nonlabored breathing, no respiratory distress  ampicillin  IVPB 1  ampicillin  IVPB 1  aspirin enteric coated 81  heparin  Injectable 5000  losartan 100        Vital Signs Last 24 Hrs  T(C): 36.3 (30 Apr 2018 05:50), Max: 37.1 (29 Apr 2018 20:10)  T(F): 97.4 (30 Apr 2018 05:50), Max: 98.7 (29 Apr 2018 20:10)  HR: 63 (30 Apr 2018 05:50) (52 - 66)  BP: 135/69 (30 Apr 2018 05:50) (109/70 - 135/69)  BP(mean): --  RR: 17 (30 Apr 2018 05:50) (16 - 17)  SpO2: 99% (30 Apr 2018 05:50) (94% - 99%)  I&O's Summary    29 Apr 2018 07:01  -  30 Apr 2018 07:00  --------------------------------------------------------  IN: 360 mL / OUT: 2050 mL / NET: -1690 mL        Physical Exam:  General: NAD, resting comfortably in bed  Pulmonary: Nonlabored breathing, no respiratory distress  Abd: soft, NT/ND.  Extrem: WWP, no edema, vac in place and functioning      LABS:                        9.8    4.2   )-----------( 172      ( 30 Apr 2018 06:09 )             32.0     04-30    140  |  102  |  10  ----------------------------<  197<H>  4.4   |  28  |  0.73    Ca    9.4      30 Apr 2018 06:09  Phos  3.2     04-30  Mg     1.8     04-30          Radiology and Additional Studies:    Assessment and Plan:       LABS:                        9.8    4.2   )-----------( 172      ( 30 Apr 2018 06:09 )             32.0     04-30    140  |  102  |  10  ----------------------------<  197<H>  4.4   |  28  |  0.73    Ca    9.4      30 Apr 2018 06:09  Phos  3.2     04-30  Mg     1.8     04-30          Radiology and Additional Studies:    Assessment and Plan:     Assessment/Plan:  68yFemale with chronic LLE lateral wound with persistent pain presents for surgical debridement and IV abx after multiple failed courses of PO antibiotics and previous hospital admission requiring IV Zosyn. She presents appearing non-toxic with no signs or symptoms of active infection, afebrile now s/p Debridement of chronic wound of left lower extremity lateral malleolus    - Consistent Carb diet  - Vac change today  - cont. ampicillin  - Pain management recs  - PT evaluation  - OOB and ambulate.  - F/u OR cultures  - continue home medications   - ISS  - DVT ppx   - AM labs   - Dispo plan: CAROLINE with Augmentin

## 2018-05-01 ENCOUNTER — TRANSCRIPTION ENCOUNTER (OUTPATIENT)
Age: 68
End: 2018-05-01

## 2018-05-01 LAB
GLUCOSE BLDC GLUCOMTR-MCNC: 172 MG/DL — HIGH (ref 70–99)
GLUCOSE BLDC GLUCOMTR-MCNC: 197 MG/DL — HIGH (ref 70–99)
GLUCOSE BLDC GLUCOMTR-MCNC: 309 MG/DL — HIGH (ref 70–99)
SURGICAL PATHOLOGY STUDY: SIGNIFICANT CHANGE UP

## 2018-05-01 RX ADMIN — HEPARIN SODIUM 5000 UNIT(S): 5000 INJECTION INTRAVENOUS; SUBCUTANEOUS at 13:44

## 2018-05-01 RX ADMIN — OXYCODONE AND ACETAMINOPHEN 2 TABLET(S): 5; 325 TABLET ORAL at 21:58

## 2018-05-01 RX ADMIN — OXYCODONE AND ACETAMINOPHEN 2 TABLET(S): 5; 325 TABLET ORAL at 20:58

## 2018-05-01 RX ADMIN — SENNA PLUS 2 TABLET(S): 8.6 TABLET ORAL at 21:42

## 2018-05-01 RX ADMIN — LOSARTAN POTASSIUM 100 MILLIGRAM(S): 100 TABLET, FILM COATED ORAL at 06:14

## 2018-05-01 RX ADMIN — Medication 108 GRAM(S): at 05:09

## 2018-05-01 RX ADMIN — HYDROMORPHONE HYDROCHLORIDE 0.5 MILLIGRAM(S): 2 INJECTION INTRAMUSCULAR; INTRAVENOUS; SUBCUTANEOUS at 08:00

## 2018-05-01 RX ADMIN — OXYCODONE AND ACETAMINOPHEN 2 TABLET(S): 5; 325 TABLET ORAL at 10:25

## 2018-05-01 RX ADMIN — OXYCODONE AND ACETAMINOPHEN 2 TABLET(S): 5; 325 TABLET ORAL at 14:40

## 2018-05-01 RX ADMIN — GABAPENTIN 400 MILLIGRAM(S): 400 CAPSULE ORAL at 21:41

## 2018-05-01 RX ADMIN — OXYCODONE AND ACETAMINOPHEN 2 TABLET(S): 5; 325 TABLET ORAL at 09:28

## 2018-05-01 RX ADMIN — HEPARIN SODIUM 5000 UNIT(S): 5000 INJECTION INTRAVENOUS; SUBCUTANEOUS at 05:09

## 2018-05-01 RX ADMIN — Medication 108 GRAM(S): at 11:55

## 2018-05-01 RX ADMIN — Medication 8: at 17:27

## 2018-05-01 RX ADMIN — Medication 2: at 22:17

## 2018-05-01 RX ADMIN — OXYCODONE AND ACETAMINOPHEN 2 TABLET(S): 5; 325 TABLET ORAL at 01:14

## 2018-05-01 RX ADMIN — SIMVASTATIN 40 MILLIGRAM(S): 20 TABLET, FILM COATED ORAL at 21:41

## 2018-05-01 RX ADMIN — GABAPENTIN 400 MILLIGRAM(S): 400 CAPSULE ORAL at 05:09

## 2018-05-01 RX ADMIN — Medication 2: at 12:41

## 2018-05-01 RX ADMIN — Medication 100 MILLIGRAM(S): at 21:41

## 2018-05-01 RX ADMIN — Medication 25 MILLIGRAM(S): at 11:55

## 2018-05-01 RX ADMIN — HYDROMORPHONE HYDROCHLORIDE 0.5 MILLIGRAM(S): 2 INJECTION INTRAMUSCULAR; INTRAVENOUS; SUBCUTANEOUS at 07:38

## 2018-05-01 RX ADMIN — Medication 2: at 07:02

## 2018-05-01 RX ADMIN — GABAPENTIN 400 MILLIGRAM(S): 400 CAPSULE ORAL at 13:44

## 2018-05-01 RX ADMIN — OXYCODONE AND ACETAMINOPHEN 2 TABLET(S): 5; 325 TABLET ORAL at 00:14

## 2018-05-01 RX ADMIN — Medication 108 GRAM(S): at 00:14

## 2018-05-01 RX ADMIN — HEPARIN SODIUM 5000 UNIT(S): 5000 INJECTION INTRAVENOUS; SUBCUTANEOUS at 22:00

## 2018-05-01 RX ADMIN — OXYCODONE AND ACETAMINOPHEN 2 TABLET(S): 5; 325 TABLET ORAL at 13:43

## 2018-05-01 RX ADMIN — Medication 81 MILLIGRAM(S): at 11:55

## 2018-05-01 RX ADMIN — Medication 108 GRAM(S): at 17:27

## 2018-05-01 RX ADMIN — INSULIN GLARGINE 8 UNIT(S): 100 INJECTION, SOLUTION SUBCUTANEOUS at 00:49

## 2018-05-01 NOTE — DISCHARGE NOTE ADULT - CARE PLAN
Principal Discharge DX:	Skin ulcer due to varicose veins  Goal:	Recovery  Assessment and plan of treatment:	Follow up with Dr. Bonilla in 1-2 weeks. Call the office at  to schedule your appointment. You may shower; soap and water over incision sites. Do not scrub. Pat dry when done. No tub bathing or swimming until cleared. Keep incision sites out of the sun as scars will darken. Ambulate as tolerated, but no heavy lifting (>10lbs) or strenuous exercise. You may resume regular diet. You should be urinating at least 3-4x per day. Call the office if you experience increasing pain, abdominal pain, nausea, vomiting, or temperature >101.4F.     Contact your doctor or go to the ER for fever > 101.5, chills, nausea, vomiting, chest pain, shortness of breath, pain not controlled by medication or excessive bleeding.     .  Goal:	Vac Principal Discharge DX:	Skin ulcer due to varicose veins  Goal:	Recovery  Assessment and plan of treatment:	Follow up with Dr. Bonilla in 1-2 weeks. Call the office at  to schedule your appointment. You may shower; soap and water over incision sites. Do not scrub. Pat dry when done. No tub bathing or swimming until cleared. Keep incision sites out of the sun as scars will darken. Ambulate as tolerated, but no heavy lifting (>10lbs) or strenuous exercise. You may resume regular diet. You should be urinating at least 3-4x per day. Call the office if you experience increasing pain, abdominal pain, nausea, vomiting, or temperature >101.4F.     Contact your doctor or go to the ER for fever > 101.5, chills, nausea, vomiting, chest pain, shortness of breath, pain not controlled by medication or excessive bleeding.     .  Goal:	Vac  Assessment and plan of treatment:	V.A.C. wound care. Change 3 times per week at 125 mmHg.  IF VAC malfunctions, then dampen gauze with saline solution. Pack into wound. Cover with dry gauze and Kerlix wrap daily. Principal Discharge DX:	Skin ulcer due to varicose veins  Goal:	Recovery  Assessment and plan of treatment:	Follow up with Dr. Bonilla in 1-2 weeks. Call the office at  to schedule your appointment. You may shower; soap and water over incision sites. Do not scrub. Pat dry when done. No tub bathing or swimming until cleared. Keep incision sites out of the sun as scars will darken. Ambulate as tolerated, but no heavy lifting (>10lbs) or strenuous exercise. You may resume regular diet. You should be urinating at least 3-4x per day. Call the office if you experience increasing pain, abdominal pain, nausea, vomiting, or temperature >101.4F.     Contact your doctor or go to the ER for fever > 101.5, chills, nausea, vomiting, chest pain, shortness of breath, pain not controlled by medication or excessive bleeding.     .  Goal:	Wound Care  Assessment and plan of treatment:	Apply wet-to-dry gauze dressing.   1. use normal saline to soak 1-2 4 x 4 gauze dressings, place them gently within the wound, they should not touch the skin,   2. 2-3 dry 4 x 4 gauze dressings may be placed, over the wet gauze.  3. Paper tape can be used to keep the bandage in place.

## 2018-05-01 NOTE — PROGRESS NOTE ADULT - SUBJECTIVE AND OBJECTIVE BOX
24hr Events:  O/N: Given 8units lantus, VSS  4/30: vac changed pending CAROLINE      Assessment/Plan:  68yFemale with chronic LLE lateral wound with persistent pain presents for surgical debridement and IV abx after multiple failed courses of PO antibiotics and previous hospital admission requiring IV Zosyn. She presents appearing non-toxic with no signs or symptoms of active infection, afebrile now s/p Debridement of chronic wound of left lower extremity lateral malleolus    - Consistent Carb diet  - Vac change MWF  - cont. ampicillin  - Pain management recs  - PT evaluation  - OOB and ambulate.  - F/u OR cultures  - continue home medications   - ISS  - DVT ppx   - AM labs   - Dispo plan: CAROLINE with Augmentin 24hr Events:  O/N: Given 8units lantus, VSS  4/30: vac changed pending CAROLINE  ampicillin  IVPB 1  ampicillin  IVPB 1  aspirin enteric coated 81  heparin  Injectable 5000  losartan 100        Vital Signs Last 24 Hrs  T(C): 36.2 (01 May 2018 05:31), Max: 36.9 (30 Apr 2018 16:51)  T(F): 97.1 (01 May 2018 05:31), Max: 98.5 (30 Apr 2018 16:51)  HR: 58 (01 May 2018 05:31) (57 - 69)  BP: 124/72 (01 May 2018 05:31) (114/71 - 153/81)  BP(mean): --  RR: 17 (01 May 2018 05:31) (16 - 18)  SpO2: 98% (01 May 2018 05:31) (95% - 100%)  I&O's Summary    30 Apr 2018 07:01  -  01 May 2018 07:00  --------------------------------------------------------  IN: 100 mL / OUT: 500 mL / NET: -400 mL        Physical Exam:  Physical Exam:  General: NAD, resting comfortably in bed  Pulmonary: Nonlabored breathing, no respiratory distress  Abd: soft, NT/ND.  Extrem: WWP, no edema, vac in place and functioning        LABS:                        9.8    4.2   )-----------( 172      ( 30 Apr 2018 06:09 )             32.0     04-30    140  |  102  |  10  ----------------------------<  197<H>  4.4   |  28  |  0.73    Ca    9.4      30 Apr 2018 06:09  Phos  3.2     04-30  Mg     1.8     04-30          Radiology and Additional Studies:    Assessment and Plan:     Assessment/Plan:  RosaliayFemale with chronic LLE lateral wound with persistent pain presents for surgical debridement and IV abx after multiple failed courses of PO antibiotics and previous hospital admission requiring IV Zosyn. She presents appearing non-toxic with no signs or symptoms of active infection, afebrile now s/p Debridement of chronic wound of left lower extremity lateral malleolus    - Consistent Carb diet  - Vac change MWF  - cont. ampicillin  - Pain management recs  - PT evaluation  - OOB and ambulate.  - F/u OR cultures  - continue home medications   - ISS  - DVT ppx   - AM labs   - Dispo plan: CAROLINE with Augmentin

## 2018-05-01 NOTE — DISCHARGE NOTE ADULT - PLAN OF CARE
Recovery Follow up with Dr. Bonilla in 1-2 weeks. Call the office at  to schedule your appointment. You may shower; soap and water over incision sites. Do not scrub. Pat dry when done. No tub bathing or swimming until cleared. Keep incision sites out of the sun as scars will darken. Ambulate as tolerated, but no heavy lifting (>10lbs) or strenuous exercise. You may resume regular diet. You should be urinating at least 3-4x per day. Call the office if you experience increasing pain, abdominal pain, nausea, vomiting, or temperature >101.4F.     Contact your doctor or go to the ER for fever > 101.5, chills, nausea, vomiting, chest pain, shortness of breath, pain not controlled by medication or excessive bleeding.     . Vac V.A.C. wound care. Change 3 times per week at 125 mmHg.  IF VAC malfunctions, then dampen gauze with saline solution. Pack into wound. Cover with dry gauze and Kerlix wrap daily. Wound Care Apply wet-to-dry gauze dressing.   1. use normal saline to soak 1-2 4 x 4 gauze dressings, place them gently within the wound, they should not touch the skin,   2. 2-3 dry 4 x 4 gauze dressings may be placed, over the wet gauze.  3. Paper tape can be used to keep the bandage in place.

## 2018-05-01 NOTE — DISCHARGE NOTE ADULT - HOSPITAL COURSE
68yFemale with chronic LLE lateral wound with persistent pain presents for surgical debridement and IV abx after multiple failed courses of PO antibiotics and previous hospital admission requiring IV Zosyn. She presents appearing non-toxic with no signs or symptoms of active infection, afebrile now s/p Debridement of chronic wound of left lower extremity lateral malleolus on 4/24.Patient did well post-procedure.  Patient had a wound vac placed, and physical therapy was consulted for disposition purposes. Her diet was advanced, and she was determined to be cleared for Sub-Acute Rehab.

## 2018-05-01 NOTE — PROGRESS NOTE ADULT - SUBJECTIVE AND OBJECTIVE BOX
Pain Management Progress Note - [facility] [phone]    HPI: Patient seen at 08:45am. Patient sitting up in bed,. Patient in no apparent distress. Patient feels pain is well controlled under current pain medication regimen.             Pertinent PMH: Pain at: ___Back ___Neck___Knee ___Hip ___Shoulder ___ Opioid tolerance    Pain is ___ sharp _x__dull _x_burning _x_achy ___ Intensity: ____ mild _x__mod __x_severe     Location __x__surgical site _____cervical _____lumbar ____abd _____upper ext__x_lower ext Left ankle    Worse with __x_activity _x__movement _____physical therapy___ Rest    Improved with __x_medication _x__rest ____physical therapy    heparin  Injectable  insulin lispro (HumaLOG) corrective regimen sliding scale  dextrose 5%.  dextrose Gel  dextrose 50% Injectable  glucagon  Injectable  piperacillin/tazobactam IVPB.  oxyCODONE    5 mG/acetaminophen 325 mG  oxyCODONE    IR  losartan  gabapentin  simvastatin  senna  insulin lispro (HumaLOG) corrective regimen sliding scale  aspirin enteric coated  sodium chloride 0.9%.  acetaminophen   Tablet.  heparin  Injectable  HYDROmorphone  Injectable  aspirin enteric coated  ondansetron Injectable  docusate sodium  sodium chloride 0.9%.  piperacillin/tazobactam IVPB.  losartan  HYDROmorphone  Injectable  oxyCODONE    5 mG/acetaminophen 325 mG  magnesium oxide  vancomycin  IVPB  diphenhydrAMINE   Capsule  magnesium sulfate  IVPB  ampicillin  IVPB  HYDROmorphone  Injectable  gabapentin  diphenhydrAMINE   Capsule  polyethylene glycol 3350  HYDROmorphone  Injectable  HYDROmorphone  Injectable  insulin glargine Injectable (LANTUS)      ROS: Const:  _-__febrile   Eyes:___ENT:___CV: _-_chest pain  Resp: _-___sob  GI:_-_nausea _-_vomiting __-_abd pain ___npo ___clears _x_full diet __bm  :___ Musk: _x_pain _-_spasm  Skin:___ Neuro:  _-__fyprazzt_-__rmlkusmcs____ numbness _x__weakness ___paresthesia  Psych:_-__anxiety  Endo:___ Heme:___Allergy:___  _x_ All other systems reviewed and negative      Hemoglobin: 9.8 g/dL (04-30 @ 06:09)    T(C): 36.6 (05-01-18 @ 13:16), Max: 36.9 (04-30-18 @ 16:51)  HR: 60 (05-01-18 @ 13:16) (56 - 66)  BP: 128/76 (05-01-18 @ 13:16) (114/70 - 128/76)  RR: 17 (05-01-18 @ 13:16) (16 - 18)  SpO2: 99% (05-01-18 @ 13:16) (95% - 100%)  Wt(kg): --     PHYSICAL EXAM:  Gen Appearance: _x__no acute distress _x__appropriate         Neuro: __x_SILT feet____ EOM Intact Psych: AAOX_3_, _x__mood/affect appropriate        Eyes: __x_conjunctiva WNL  _____ Pupils equal and round        ENT: __x_ears and nose atraumatic___ Hearing grossly intact        Neck: _x__trachea midline, no visible masses ___thyroid without palpable mass    Resp: _x__Nml WOB____No tactile fremitus ___clear to auscultation    Cardio: __x_extremities free from edema _x___pedal pulses palpable    GI/Abdomen: _x__soft ___x_ Nontender___x___Nondistended_____HSM    Lymphatic: ___no palpable nodes in neck  ___no palpable nodes calves and feet    Skin/Wound: _x__Incision, _x__Dressing c/d/i,   __x__surrounding tissues soft,  _x__wound vac present to Left Lower extremity    Muscular: EHL _4__/5  Gastrocnemius__4_/5    _x__absent clubbing/cyanosis         ASSESSMENT:  This is a 68y old Female with a history of:  SKIN ULCER DUE TO Varicose Veins  No h/o HF  No pertinent family history in first degree relatives  Handoff  MEWS Score  Varicose vein of leg  Type 2 diabetes mellitus without complication, without long-term current use of insulin  Essential hypertension  Hyperlipidemia, unspecified hyperlipidemia type  Wound of left lower extremity  Wound of left lower extremity  Skin ulcer due to varicose veins  Debridement  No significant past surgical history  LEG PAIN  17    Recommended Treatment PLAN:  1. Recommend Percocet 2 tablets Q3h prn for moderate to severe pain  2. Recommend continuing Gabapentin 400mg TID po   3. Recommend continue 0.5mg Dilaudid IVP Q4H for moderate pain  Plan Discussed with Dr. Ulisses Larry

## 2018-05-01 NOTE — DISCHARGE NOTE ADULT - PATIENT PORTAL LINK FT
You can access the [facility] Patient Portal, offered by [facility], by registering with the following website: [email or web address]

## 2018-05-01 NOTE — DISCHARGE NOTE ADULT - MEDICATION SUMMARY - MEDICATIONS TO TAKE
I will START or STAY ON the medications listed below when I get home from the hospital:    oxyCODONE-acetaminophen 5 mg-325 mg oral tablet  -- 1 tab(s) by mouth every 6 hours, As Needed -for severe pain MDD:4   -- Caution federal law prohibits the transfer of this drug to any person other  than the person for whom it was prescribed.  May cause drowsiness.  Alcohol may intensify this effect.  Use care when operating dangerous machinery.  This prescription cannot be refilled.  This product contains acetaminophen.  Do not use  with any other product containing acetaminophen to prevent possible liver damage.  Using more of this medication than prescribed may cause serious breathing problems.    -- Indication: For Post-op    Cozaar 100 mg oral tablet  -- 1 tab(s) by mouth once a day  -- Indication: For Home Med    irbesartan  -- 300 milligram(s) by mouth once a day  -- Indication: For Home Med    gabapentin 300 mg oral capsule  -- 1 cap(s) by mouth 3 times a day  -- Indication: For Home Med    metFORMIN 500 mg oral tablet, extended release  -- 1 tab(s) by mouth once a day  -- Indication: For Home Med    pioglitazone  -- 45 milligram(s) by mouth once a day  -- Indication: For Home Med    Invokana 100 mg oral tablet  -- 1 tab(s) by mouth once a day  -- Indication: For Home Med    glipiZIDE 5 mg oral tablet  -- 1 tab(s) by mouth once a day  -- Indication: For Home Med    simvastatin 40 mg oral tablet  -- 1 tab(s) by mouth once a day (at bedtime)  -- Indication: For Home Med    senna oral tablet  -- 2 tab(s) by mouth once a day (at bedtime)  -- Indication: For Home Med    Augmentin 875 mg-125 mg oral tablet  -- 1 tab(s) by mouth 2 times a day   -- Finish all this medication unless otherwise directed by prescriber.  Take with food or milk.    -- Indication: For Wound care

## 2018-05-01 NOTE — DISCHARGE NOTE ADULT - CARE PROVIDER_API CALL
Monica Bonilla), Surgery; Vascular Surgery  [address]  [address]  [place], [place] [address]  Phone: [phone]  Fax: [phone]

## 2018-05-02 VITALS
OXYGEN SATURATION: 98 % | HEART RATE: 57 BPM | SYSTOLIC BLOOD PRESSURE: 158 MMHG | RESPIRATION RATE: 16 BRPM | TEMPERATURE: 98 F | DIASTOLIC BLOOD PRESSURE: 77 MMHG

## 2018-05-02 LAB
ANION GAP SERPL CALC-SCNC: 10 MMOL/L — SIGNIFICANT CHANGE UP (ref 5–17)
BUN SERPL-MCNC: 9 MG/DL — SIGNIFICANT CHANGE UP (ref 7–23)
CALCIUM SERPL-MCNC: 9.6 MG/DL — SIGNIFICANT CHANGE UP (ref 8.4–10.5)
CHLORIDE SERPL-SCNC: 101 MMOL/L — SIGNIFICANT CHANGE UP (ref 96–108)
CO2 SERPL-SCNC: 28 MMOL/L — SIGNIFICANT CHANGE UP (ref 22–31)
CREAT SERPL-MCNC: 0.76 MG/DL — SIGNIFICANT CHANGE UP (ref 0.5–1.3)
GLUCOSE BLDC GLUCOMTR-MCNC: 253 MG/DL — HIGH (ref 70–99)
GLUCOSE BLDC GLUCOMTR-MCNC: 294 MG/DL — HIGH (ref 70–99)
GLUCOSE BLDC GLUCOMTR-MCNC: 310 MG/DL — HIGH (ref 70–99)
GLUCOSE SERPL-MCNC: 229 MG/DL — HIGH (ref 70–99)
HCT VFR BLD CALC: 32 % — LOW (ref 34.5–45)
HGB BLD-MCNC: 9.7 G/DL — LOW (ref 11.5–15.5)
MAGNESIUM SERPL-MCNC: 1.8 MG/DL — SIGNIFICANT CHANGE UP (ref 1.6–2.6)
MCHC RBC-ENTMCNC: 28 PG — SIGNIFICANT CHANGE UP (ref 27–34)
MCHC RBC-ENTMCNC: 30.3 G/DL — LOW (ref 32–36)
MCV RBC AUTO: 92.2 FL — SIGNIFICANT CHANGE UP (ref 80–100)
PHOSPHATE SERPL-MCNC: 3.4 MG/DL — SIGNIFICANT CHANGE UP (ref 2.5–4.5)
PLATELET # BLD AUTO: 162 K/UL — SIGNIFICANT CHANGE UP (ref 150–400)
POTASSIUM SERPL-MCNC: 4.2 MMOL/L — SIGNIFICANT CHANGE UP (ref 3.5–5.3)
POTASSIUM SERPL-SCNC: 4.2 MMOL/L — SIGNIFICANT CHANGE UP (ref 3.5–5.3)
RBC # BLD: 3.47 M/UL — LOW (ref 3.8–5.2)
RBC # FLD: 15.3 % — SIGNIFICANT CHANGE UP (ref 10.3–16.9)
SODIUM SERPL-SCNC: 139 MMOL/L — SIGNIFICANT CHANGE UP (ref 135–145)
WBC # BLD: 3.4 K/UL — LOW (ref 3.8–10.5)
WBC # FLD AUTO: 3.4 K/UL — LOW (ref 3.8–10.5)

## 2018-05-02 PROCEDURE — 85025 COMPLETE CBC W/AUTO DIFF WBC: CPT

## 2018-05-02 PROCEDURE — 93005 ELECTROCARDIOGRAM TRACING: CPT

## 2018-05-02 PROCEDURE — 86901 BLOOD TYPING SEROLOGIC RH(D): CPT

## 2018-05-02 PROCEDURE — 81001 URINALYSIS AUTO W/SCOPE: CPT

## 2018-05-02 PROCEDURE — 86900 BLOOD TYPING SEROLOGIC ABO: CPT

## 2018-05-02 PROCEDURE — 71046 X-RAY EXAM CHEST 2 VIEWS: CPT

## 2018-05-02 PROCEDURE — 80202 ASSAY OF VANCOMYCIN: CPT

## 2018-05-02 PROCEDURE — 87186 SC STD MICRODIL/AGAR DIL: CPT

## 2018-05-02 PROCEDURE — 36415 COLL VENOUS BLD VENIPUNCTURE: CPT

## 2018-05-02 PROCEDURE — 99285 EMERGENCY DEPT VISIT HI MDM: CPT | Mod: 25

## 2018-05-02 PROCEDURE — 85610 PROTHROMBIN TIME: CPT

## 2018-05-02 PROCEDURE — 86850 RBC ANTIBODY SCREEN: CPT

## 2018-05-02 PROCEDURE — 87075 CULTR BACTERIA EXCEPT BLOOD: CPT

## 2018-05-02 PROCEDURE — 83735 ASSAY OF MAGNESIUM: CPT

## 2018-05-02 PROCEDURE — 85027 COMPLETE CBC AUTOMATED: CPT

## 2018-05-02 PROCEDURE — 84100 ASSAY OF PHOSPHORUS: CPT

## 2018-05-02 PROCEDURE — 85730 THROMBOPLASTIN TIME PARTIAL: CPT

## 2018-05-02 PROCEDURE — 97530 THERAPEUTIC ACTIVITIES: CPT

## 2018-05-02 PROCEDURE — 80048 BASIC METABOLIC PNL TOTAL CA: CPT

## 2018-05-02 PROCEDURE — 88305 TISSUE EXAM BY PATHOLOGIST: CPT

## 2018-05-02 PROCEDURE — 97116 GAIT TRAINING THERAPY: CPT

## 2018-05-02 PROCEDURE — 80053 COMPREHEN METABOLIC PANEL: CPT

## 2018-05-02 PROCEDURE — 97161 PT EVAL LOW COMPLEX 20 MIN: CPT

## 2018-05-02 PROCEDURE — 82962 GLUCOSE BLOOD TEST: CPT

## 2018-05-02 PROCEDURE — 87070 CULTURE OTHR SPECIMN AEROBIC: CPT

## 2018-05-02 RX ORDER — HYDROMORPHONE HYDROCHLORIDE 2 MG/ML
1 INJECTION INTRAMUSCULAR; INTRAVENOUS; SUBCUTANEOUS ONCE
Qty: 0 | Refills: 0 | Status: DISCONTINUED | OUTPATIENT
Start: 2018-05-02 | End: 2018-05-02

## 2018-05-02 RX ORDER — INSULIN GLARGINE 100 [IU]/ML
10 INJECTION, SOLUTION SUBCUTANEOUS AT BEDTIME
Qty: 0 | Refills: 0 | Status: DISCONTINUED | OUTPATIENT
Start: 2018-05-02 | End: 2018-05-02

## 2018-05-02 RX ORDER — MAGNESIUM SULFATE 500 MG/ML
1 VIAL (ML) INJECTION ONCE
Qty: 0 | Refills: 0 | Status: COMPLETED | OUTPATIENT
Start: 2018-05-02 | End: 2018-05-02

## 2018-05-02 RX ORDER — INSULIN GLARGINE 100 [IU]/ML
15 INJECTION, SOLUTION SUBCUTANEOUS AT BEDTIME
Qty: 0 | Refills: 0 | Status: DISCONTINUED | OUTPATIENT
Start: 2018-05-02 | End: 2018-05-02

## 2018-05-02 RX ORDER — FLUCONAZOLE 150 MG/1
150 TABLET ORAL ONCE
Qty: 0 | Refills: 0 | Status: COMPLETED | OUTPATIENT
Start: 2018-05-02 | End: 2018-05-02

## 2018-05-02 RX ADMIN — Medication 100 MILLIGRAM(S): at 14:05

## 2018-05-02 RX ADMIN — Medication 108 GRAM(S): at 00:00

## 2018-05-02 RX ADMIN — FLUCONAZOLE 150 MILLIGRAM(S): 150 TABLET ORAL at 11:36

## 2018-05-02 RX ADMIN — HEPARIN SODIUM 5000 UNIT(S): 5000 INJECTION INTRAVENOUS; SUBCUTANEOUS at 14:05

## 2018-05-02 RX ADMIN — Medication 108 GRAM(S): at 06:09

## 2018-05-02 RX ADMIN — Medication 108 GRAM(S): at 17:35

## 2018-05-02 RX ADMIN — OXYCODONE AND ACETAMINOPHEN 2 TABLET(S): 5; 325 TABLET ORAL at 06:10

## 2018-05-02 RX ADMIN — Medication 8: at 17:35

## 2018-05-02 RX ADMIN — HEPARIN SODIUM 5000 UNIT(S): 5000 INJECTION INTRAVENOUS; SUBCUTANEOUS at 06:09

## 2018-05-02 RX ADMIN — GABAPENTIN 400 MILLIGRAM(S): 400 CAPSULE ORAL at 14:05

## 2018-05-02 RX ADMIN — HYDROMORPHONE HYDROCHLORIDE 1 MILLIGRAM(S): 2 INJECTION INTRAMUSCULAR; INTRAVENOUS; SUBCUTANEOUS at 14:02

## 2018-05-02 RX ADMIN — Medication 6: at 12:42

## 2018-05-02 RX ADMIN — HYDROMORPHONE HYDROCHLORIDE 1 MILLIGRAM(S): 2 INJECTION INTRAMUSCULAR; INTRAVENOUS; SUBCUTANEOUS at 14:20

## 2018-05-02 RX ADMIN — OXYCODONE AND ACETAMINOPHEN 2 TABLET(S): 5; 325 TABLET ORAL at 11:40

## 2018-05-02 RX ADMIN — Medication 100 MILLIGRAM(S): at 06:10

## 2018-05-02 RX ADMIN — Medication 81 MILLIGRAM(S): at 11:36

## 2018-05-02 RX ADMIN — OXYCODONE AND ACETAMINOPHEN 2 TABLET(S): 5; 325 TABLET ORAL at 10:43

## 2018-05-02 RX ADMIN — GABAPENTIN 400 MILLIGRAM(S): 400 CAPSULE ORAL at 06:09

## 2018-05-02 RX ADMIN — Medication 100 GRAM(S): at 07:51

## 2018-05-02 RX ADMIN — OXYCODONE AND ACETAMINOPHEN 2 TABLET(S): 5; 325 TABLET ORAL at 07:10

## 2018-05-02 RX ADMIN — LOSARTAN POTASSIUM 100 MILLIGRAM(S): 100 TABLET, FILM COATED ORAL at 06:10

## 2018-05-02 RX ADMIN — Medication 108 GRAM(S): at 11:35

## 2018-05-02 RX ADMIN — Medication 25 MILLIGRAM(S): at 11:36

## 2018-05-02 RX ADMIN — Medication 6: at 07:51

## 2018-05-02 NOTE — PROGRESS NOTE ADULT - PROVIDER SPECIALTY LIST ADULT
Pain Medicine
Vascular Surgery
Pain Medicine
Vascular Surgery
Pain Medicine

## 2018-05-02 NOTE — PROGRESS NOTE ADULT - SUBJECTIVE AND OBJECTIVE BOX
24hr Events:  O/N:ROXANN, C/o vaginal itching, VSS  5/1: ROXANN, AVSS      Assessment/Plan:  68yFemale with chronic LLE lateral wound with persistent pain presents for surgical debridement and IV abx after multiple failed courses of PO antibiotics and previous hospital admission requiring IV Zosyn. She presents appearing non-toxic with no signs or symptoms of active infection, afebrile now s/p Debridement of chronic wound of left lower extremity lateral malleolus    - Consistent Carb diet  - Vac change MWF  - cont. ampicillin  - Pain management recs  - PT evaluation  - OOB and ambulate.  - F/u OR cultures  - continue home medications   - ISS  - DVT ppx   - AM labs   - Dispo plan: CAROLINE with Augmentin 24hr Events:  O/N:ROXANN, C/o vaginal itching, VSS  5/1: ROXANN, AVSS    ampicillin  IVPB 1  ampicillin  IVPB 1  aspirin enteric coated 81  heparin  Injectable 5000  losartan 100        Vital Signs Last 24 Hrs  T(C): 36.8 (02 May 2018 05:59), Max: 36.8 (02 May 2018 05:59)  T(F): 98.3 (02 May 2018 05:59), Max: 98.3 (02 May 2018 05:59)  HR: 61 (02 May 2018 05:59) (54 - 68)  BP: 133/73 (02 May 2018 05:59) (110/62 - 133/73)  BP(mean): --  RR: 16 (02 May 2018 05:59) (16 - 18)  SpO2: 98% (02 May 2018 05:59) (98% - 99%)  I&O's Summary    01 May 2018 07:01  -  02 May 2018 07:00  --------------------------------------------------------  IN: 1500 mL / OUT: 1350 mL / NET: 150 mL        Physical Exam:  General: NAD, resting comfortably in bed  Pulmonary: Nonlabored breathing, no respiratory distress  Abd: soft, NT/ND.  Extrem: WWP, no edema, vac in place and functioning      LABS:                        9.7    3.4   )-----------( 162      ( 02 May 2018 06:49 )             32.0     05-02    139  |  101  |  9   ----------------------------<  229<H>  4.2   |  28  |  0.76    Ca    9.6      02 May 2018 06:47  Phos  3.4     05-02  Mg     1.8     05-02          Radiology and Additional Studies:    Assessment and Plan:     Assessment/Plan:  RosaliayFemale with chronic LLE lateral wound with persistent pain presents for surgical debridement and IV abx after multiple failed courses of PO antibiotics and previous hospital admission requiring IV Zosyn. She presents appearing non-toxic with no signs or symptoms of active infection, afebrile now s/p Debridement of chronic wound of left lower extremity lateral malleolus    - Consistent Carb diet  - Vac change MWF  - cont. ampicillin  - Pain management recs  - PT evaluation  - OOB and ambulate.  - F/u OR cultures  - continue home medications   - ISS  - DVT ppx   - AM labs   - Dispo plan: CAROLINE with Augmentin

## 2018-05-07 DIAGNOSIS — L97.329 NON-PRESSURE CHRONIC ULCER OF LEFT ANKLE WITH UNSPECIFIED SEVERITY: ICD-10-CM

## 2018-05-07 DIAGNOSIS — Z79.84 LONG TERM (CURRENT) USE OF ORAL HYPOGLYCEMIC DRUGS: ICD-10-CM

## 2018-05-07 DIAGNOSIS — D64.9 ANEMIA, UNSPECIFIED: ICD-10-CM

## 2018-05-07 DIAGNOSIS — Z79.2 LONG TERM (CURRENT) USE OF ANTIBIOTICS: ICD-10-CM

## 2018-05-07 DIAGNOSIS — B96.5 PSEUDOMONAS (AERUGINOSA) (MALLEI) (PSEUDOMALLEI) AS THE CAUSE OF DISEASES CLASSIFIED ELSEWHERE: ICD-10-CM

## 2018-05-07 DIAGNOSIS — Z79.891 LONG TERM (CURRENT) USE OF OPIATE ANALGESIC: ICD-10-CM

## 2018-05-07 DIAGNOSIS — E78.5 HYPERLIPIDEMIA, UNSPECIFIED: ICD-10-CM

## 2018-05-07 DIAGNOSIS — E11.622 TYPE 2 DIABETES MELLITUS WITH OTHER SKIN ULCER: ICD-10-CM

## 2018-05-07 DIAGNOSIS — I10 ESSENTIAL (PRIMARY) HYPERTENSION: ICD-10-CM

## 2018-05-18 ENCOUNTER — APPOINTMENT (OUTPATIENT)
Dept: VASCULAR SURGERY | Facility: CLINIC | Age: 68
End: 2018-05-18
Payer: COMMERCIAL

## 2018-05-18 PROCEDURE — 99213 OFFICE O/P EST LOW 20 MIN: CPT

## 2018-05-31 ENCOUNTER — RX RENEWAL (OUTPATIENT)
Age: 68
End: 2018-05-31

## 2018-05-31 RX ORDER — OXYCODONE AND ACETAMINOPHEN 5; 325 MG/1; MG/1
5-325 TABLET ORAL
Qty: 30 | Refills: 0 | Status: ACTIVE | COMMUNITY
Start: 2018-04-20 | End: 1900-01-01

## 2018-06-15 ENCOUNTER — APPOINTMENT (OUTPATIENT)
Dept: VASCULAR SURGERY | Facility: CLINIC | Age: 68
End: 2018-06-15
Payer: COMMERCIAL

## 2018-06-15 ENCOUNTER — OUTPATIENT (OUTPATIENT)
Dept: OUTPATIENT SERVICES | Facility: HOSPITAL | Age: 68
LOS: 1 days | End: 2018-06-15
Payer: COMMERCIAL

## 2018-06-15 ENCOUNTER — OTHER (OUTPATIENT)
Age: 68
End: 2018-06-15

## 2018-06-15 VITALS — TEMPERATURE: 98.4 F | SYSTOLIC BLOOD PRESSURE: 127 MMHG | OXYGEN SATURATION: 98 % | DIASTOLIC BLOOD PRESSURE: 67 MMHG

## 2018-06-15 DIAGNOSIS — L98.491 NON-PRESSURE CHRONIC ULCER OF SKIN OF OTHER SITES LIMITED TO BREAKDOWN OF SKIN: ICD-10-CM

## 2018-06-15 LAB
ALBUMIN SERPL ELPH-MCNC: 4.2 G/DL — SIGNIFICANT CHANGE UP (ref 3.3–5)
ALP SERPL-CCNC: 104 U/L — SIGNIFICANT CHANGE UP (ref 40–120)
ALT FLD-CCNC: 11 U/L — SIGNIFICANT CHANGE UP (ref 10–45)
ANION GAP SERPL CALC-SCNC: 13 MMOL/L — SIGNIFICANT CHANGE UP (ref 5–17)
APTT BLD: 37 SEC — SIGNIFICANT CHANGE UP (ref 27.5–37.4)
AST SERPL-CCNC: 17 U/L — SIGNIFICANT CHANGE UP (ref 10–40)
BILIRUB SERPL-MCNC: 0.5 MG/DL — SIGNIFICANT CHANGE UP (ref 0.2–1.2)
BUN SERPL-MCNC: 21 MG/DL — SIGNIFICANT CHANGE UP (ref 7–23)
CALCIUM SERPL-MCNC: 10.1 MG/DL — SIGNIFICANT CHANGE UP (ref 8.4–10.5)
CHLORIDE SERPL-SCNC: 101 MMOL/L — SIGNIFICANT CHANGE UP (ref 96–108)
CO2 SERPL-SCNC: 26 MMOL/L — SIGNIFICANT CHANGE UP (ref 22–31)
CREAT SERPL-MCNC: 1 MG/DL — SIGNIFICANT CHANGE UP (ref 0.5–1.3)
GLUCOSE SERPL-MCNC: 231 MG/DL — HIGH (ref 70–99)
HCT VFR BLD CALC: 37.8 % — SIGNIFICANT CHANGE UP (ref 34.5–45)
HGB BLD-MCNC: 11.2 G/DL — LOW (ref 11.5–15.5)
INR BLD: 1 — SIGNIFICANT CHANGE UP (ref 0.88–1.16)
MCHC RBC-ENTMCNC: 26.9 PG — LOW (ref 27–34)
MCHC RBC-ENTMCNC: 29.6 G/DL — LOW (ref 32–36)
MCV RBC AUTO: 90.9 FL — SIGNIFICANT CHANGE UP (ref 80–100)
PLATELET # BLD AUTO: 224 K/UL — SIGNIFICANT CHANGE UP (ref 150–400)
POTASSIUM SERPL-MCNC: 5.1 MMOL/L — SIGNIFICANT CHANGE UP (ref 3.5–5.3)
POTASSIUM SERPL-SCNC: 5.1 MMOL/L — SIGNIFICANT CHANGE UP (ref 3.5–5.3)
PROT SERPL-MCNC: 7.3 G/DL — SIGNIFICANT CHANGE UP (ref 6–8.3)
PROTHROM AB SERPL-ACNC: 11.1 SEC — SIGNIFICANT CHANGE UP (ref 9.8–12.7)
RBC # BLD: 4.16 M/UL — SIGNIFICANT CHANGE UP (ref 3.8–5.2)
RBC # FLD: 14.9 % — SIGNIFICANT CHANGE UP (ref 10.3–16.9)
SODIUM SERPL-SCNC: 140 MMOL/L — SIGNIFICANT CHANGE UP (ref 135–145)
WBC # BLD: 3.8 K/UL — SIGNIFICANT CHANGE UP (ref 3.8–10.5)
WBC # FLD AUTO: 3.8 K/UL — SIGNIFICANT CHANGE UP (ref 3.8–10.5)

## 2018-06-15 PROCEDURE — 99212 OFFICE O/P EST SF 10 MIN: CPT

## 2018-06-15 PROCEDURE — 85027 COMPLETE CBC AUTOMATED: CPT

## 2018-06-15 PROCEDURE — 80053 COMPREHEN METABOLIC PANEL: CPT

## 2018-06-15 PROCEDURE — 93005 ELECTROCARDIOGRAM TRACING: CPT

## 2018-06-15 PROCEDURE — 85610 PROTHROMBIN TIME: CPT

## 2018-06-15 PROCEDURE — 85730 THROMBOPLASTIN TIME PARTIAL: CPT

## 2018-06-15 PROCEDURE — 93010 ELECTROCARDIOGRAM REPORT: CPT

## 2018-06-22 RX ORDER — DOCUSATE SODIUM 100 MG/1
100 CAPSULE, LIQUID FILLED ORAL
Qty: 5 | Refills: 0 | Status: ACTIVE | COMMUNITY
Start: 2018-04-02

## 2018-06-25 VITALS
TEMPERATURE: 98 F | SYSTOLIC BLOOD PRESSURE: 137 MMHG | DIASTOLIC BLOOD PRESSURE: 64 MMHG | HEART RATE: 56 BPM | WEIGHT: 171.96 LBS | RESPIRATION RATE: 16 BRPM | OXYGEN SATURATION: 97 % | HEIGHT: 67 IN

## 2018-06-25 RX ORDER — METFORMIN HYDROCHLORIDE 850 MG/1
1 TABLET ORAL
Qty: 0 | Refills: 0 | COMMUNITY

## 2018-06-25 NOTE — PATIENT PROFILE ADULT. - --DESCRIBE
no active drainage noted on admission. Kerlix dressing wrapped on the site, per pt dressing was changed by visiting nurse 6/25/18 evening

## 2018-06-25 NOTE — PRE-OP CHECKLIST - 2.
Left lower extremity with Kerlix dressing, per pt it was changed by visiting nurse on 6/25 evening, No active drainage.

## 2018-06-26 ENCOUNTER — INPATIENT (INPATIENT)
Facility: HOSPITAL | Age: 68
LOS: 4 days | Discharge: HOME HEALTH | DRG: 623 | End: 2018-07-01
Attending: SURGERY | Admitting: SURGERY
Payer: COMMERCIAL

## 2018-06-26 ENCOUNTER — APPOINTMENT (OUTPATIENT)
Dept: VASCULAR SURGERY | Facility: HOSPITAL | Age: 68
End: 2018-06-26

## 2018-06-26 DIAGNOSIS — Z90.710 ACQUIRED ABSENCE OF BOTH CERVIX AND UTERUS: Chronic | ICD-10-CM

## 2018-06-26 DIAGNOSIS — D25.9 LEIOMYOMA OF UTERUS, UNSPECIFIED: Chronic | ICD-10-CM

## 2018-06-26 DIAGNOSIS — N20.0 CALCULUS OF KIDNEY: Chronic | ICD-10-CM

## 2018-06-26 LAB
GLUCOSE BLDC GLUCOMTR-MCNC: 101 MG/DL — HIGH (ref 70–99)
GLUCOSE BLDC GLUCOMTR-MCNC: 116 MG/DL — HIGH (ref 70–99)
GLUCOSE BLDC GLUCOMTR-MCNC: 182 MG/DL — HIGH (ref 70–99)
GLUCOSE BLDC GLUCOMTR-MCNC: 216 MG/DL — HIGH (ref 70–99)
GLUCOSE BLDC GLUCOMTR-MCNC: 79 MG/DL — SIGNIFICANT CHANGE UP (ref 70–99)

## 2018-06-26 PROCEDURE — 15100 SPLT AGRFT T/A/L 1ST 100SQCM: CPT | Mod: GC

## 2018-06-26 PROCEDURE — 97606 NEG PRS WND THER DME>50 SQCM: CPT | Mod: 59,GC

## 2018-06-26 RX ORDER — OXYCODONE AND ACETAMINOPHEN 5; 325 MG/1; MG/1
2 TABLET ORAL EVERY 4 HOURS
Qty: 0 | Refills: 0 | Status: DISCONTINUED | OUTPATIENT
Start: 2018-06-26 | End: 2018-07-01

## 2018-06-26 RX ORDER — DEXTROSE 50 % IN WATER 50 %
25 SYRINGE (ML) INTRAVENOUS ONCE
Qty: 0 | Refills: 0 | Status: DISCONTINUED | OUTPATIENT
Start: 2018-06-26 | End: 2018-07-01

## 2018-06-26 RX ORDER — SODIUM CHLORIDE 9 MG/ML
1000 INJECTION INTRAMUSCULAR; INTRAVENOUS; SUBCUTANEOUS
Qty: 0 | Refills: 0 | Status: DISCONTINUED | OUTPATIENT
Start: 2018-06-26 | End: 2018-06-27

## 2018-06-26 RX ORDER — HEPARIN SODIUM 5000 [USP'U]/ML
5000 INJECTION INTRAVENOUS; SUBCUTANEOUS EVERY 8 HOURS
Qty: 0 | Refills: 0 | Status: DISCONTINUED | OUTPATIENT
Start: 2018-06-26 | End: 2018-07-01

## 2018-06-26 RX ORDER — HYDROMORPHONE HYDROCHLORIDE 2 MG/ML
0.5 INJECTION INTRAMUSCULAR; INTRAVENOUS; SUBCUTANEOUS ONCE
Qty: 0 | Refills: 0 | Status: DISCONTINUED | OUTPATIENT
Start: 2018-06-26 | End: 2018-06-26

## 2018-06-26 RX ORDER — DEXTROSE 50 % IN WATER 50 %
12.5 SYRINGE (ML) INTRAVENOUS ONCE
Qty: 0 | Refills: 0 | Status: DISCONTINUED | OUTPATIENT
Start: 2018-06-26 | End: 2018-07-01

## 2018-06-26 RX ORDER — GLUCAGON INJECTION, SOLUTION 0.5 MG/.1ML
1 INJECTION, SOLUTION SUBCUTANEOUS ONCE
Qty: 0 | Refills: 0 | Status: DISCONTINUED | OUTPATIENT
Start: 2018-06-26 | End: 2018-07-01

## 2018-06-26 RX ORDER — SODIUM CHLORIDE 9 MG/ML
1000 INJECTION, SOLUTION INTRAVENOUS
Qty: 0 | Refills: 0 | Status: DISCONTINUED | OUTPATIENT
Start: 2018-06-26 | End: 2018-06-26

## 2018-06-26 RX ORDER — DEXTROSE 50 % IN WATER 50 %
15 SYRINGE (ML) INTRAVENOUS ONCE
Qty: 0 | Refills: 0 | Status: DISCONTINUED | OUTPATIENT
Start: 2018-06-26 | End: 2018-07-01

## 2018-06-26 RX ORDER — DEXTROSE 50 % IN WATER 50 %
25 SYRINGE (ML) INTRAVENOUS ONCE
Qty: 0 | Refills: 0 | Status: DISCONTINUED | OUTPATIENT
Start: 2018-06-26 | End: 2018-06-26

## 2018-06-26 RX ORDER — LOSARTAN POTASSIUM 100 MG/1
100 TABLET, FILM COATED ORAL DAILY
Qty: 0 | Refills: 0 | Status: DISCONTINUED | OUTPATIENT
Start: 2018-06-26 | End: 2018-06-26

## 2018-06-26 RX ORDER — GABAPENTIN 400 MG/1
300 CAPSULE ORAL THREE TIMES A DAY
Qty: 0 | Refills: 0 | Status: DISCONTINUED | OUTPATIENT
Start: 2018-06-26 | End: 2018-06-26

## 2018-06-26 RX ORDER — GLUCAGON INJECTION, SOLUTION 0.5 MG/.1ML
1 INJECTION, SOLUTION SUBCUTANEOUS ONCE
Qty: 0 | Refills: 0 | Status: DISCONTINUED | OUTPATIENT
Start: 2018-06-26 | End: 2018-06-26

## 2018-06-26 RX ORDER — ACETAMINOPHEN 500 MG
1000 TABLET ORAL ONCE
Qty: 0 | Refills: 0 | Status: COMPLETED | OUTPATIENT
Start: 2018-06-26 | End: 2018-06-26

## 2018-06-26 RX ORDER — ONDANSETRON 8 MG/1
4 TABLET, FILM COATED ORAL EVERY 6 HOURS
Qty: 0 | Refills: 0 | Status: DISCONTINUED | OUTPATIENT
Start: 2018-06-26 | End: 2018-07-01

## 2018-06-26 RX ORDER — SODIUM CHLORIDE 9 MG/ML
250 INJECTION INTRAMUSCULAR; INTRAVENOUS; SUBCUTANEOUS ONCE
Qty: 0 | Refills: 0 | Status: COMPLETED | OUTPATIENT
Start: 2018-06-26 | End: 2018-06-26

## 2018-06-26 RX ORDER — SIMVASTATIN 20 MG/1
40 TABLET, FILM COATED ORAL AT BEDTIME
Qty: 0 | Refills: 0 | Status: DISCONTINUED | OUTPATIENT
Start: 2018-06-26 | End: 2018-07-01

## 2018-06-26 RX ORDER — ONDANSETRON 8 MG/1
4 TABLET, FILM COATED ORAL EVERY 6 HOURS
Qty: 0 | Refills: 0 | Status: DISCONTINUED | OUTPATIENT
Start: 2018-06-26 | End: 2018-06-26

## 2018-06-26 RX ORDER — OXYCODONE AND ACETAMINOPHEN 5; 325 MG/1; MG/1
1 TABLET ORAL EVERY 4 HOURS
Qty: 0 | Refills: 0 | Status: DISCONTINUED | OUTPATIENT
Start: 2018-06-26 | End: 2018-06-26

## 2018-06-26 RX ORDER — CEFAZOLIN SODIUM 1 G
1000 VIAL (EA) INJECTION EVERY 8 HOURS
Qty: 0 | Refills: 0 | Status: COMPLETED | OUTPATIENT
Start: 2018-06-26 | End: 2018-06-27

## 2018-06-26 RX ORDER — METFORMIN HYDROCHLORIDE 850 MG/1
0 TABLET ORAL
Qty: 0 | Refills: 0 | COMMUNITY

## 2018-06-26 RX ORDER — SIMVASTATIN 20 MG/1
40 TABLET, FILM COATED ORAL AT BEDTIME
Qty: 0 | Refills: 0 | Status: DISCONTINUED | OUTPATIENT
Start: 2018-06-26 | End: 2018-06-26

## 2018-06-26 RX ORDER — INSULIN LISPRO 100/ML
VIAL (ML) SUBCUTANEOUS
Qty: 0 | Refills: 0 | Status: DISCONTINUED | OUTPATIENT
Start: 2018-06-26 | End: 2018-06-26

## 2018-06-26 RX ORDER — OXYCODONE AND ACETAMINOPHEN 5; 325 MG/1; MG/1
1 TABLET ORAL EVERY 4 HOURS
Qty: 0 | Refills: 0 | Status: DISCONTINUED | OUTPATIENT
Start: 2018-06-26 | End: 2018-07-01

## 2018-06-26 RX ORDER — PIOGLITAZONE HYDROCHLORIDE 15 MG/1
45 TABLET ORAL
Qty: 0 | Refills: 0 | COMMUNITY

## 2018-06-26 RX ORDER — GABAPENTIN 400 MG/1
300 CAPSULE ORAL THREE TIMES A DAY
Qty: 0 | Refills: 0 | Status: DISCONTINUED | OUTPATIENT
Start: 2018-06-26 | End: 2018-06-27

## 2018-06-26 RX ORDER — DEXTROSE 50 % IN WATER 50 %
15 SYRINGE (ML) INTRAVENOUS ONCE
Qty: 0 | Refills: 0 | Status: DISCONTINUED | OUTPATIENT
Start: 2018-06-26 | End: 2018-06-26

## 2018-06-26 RX ORDER — INSULIN LISPRO 100/ML
VIAL (ML) SUBCUTANEOUS
Qty: 0 | Refills: 0 | Status: DISCONTINUED | OUTPATIENT
Start: 2018-06-26 | End: 2018-07-01

## 2018-06-26 RX ORDER — IRBESARTAN 75 MG/1
300 TABLET ORAL
Qty: 0 | Refills: 0 | COMMUNITY

## 2018-06-26 RX ORDER — IBUPROFEN 200 MG
0 TABLET ORAL
Qty: 0 | Refills: 0 | COMMUNITY

## 2018-06-26 RX ORDER — OXYCODONE AND ACETAMINOPHEN 5; 325 MG/1; MG/1
2 TABLET ORAL EVERY 4 HOURS
Qty: 0 | Refills: 0 | Status: DISCONTINUED | OUTPATIENT
Start: 2018-06-26 | End: 2018-06-26

## 2018-06-26 RX ORDER — DEXTROSE 50 % IN WATER 50 %
12.5 SYRINGE (ML) INTRAVENOUS ONCE
Qty: 0 | Refills: 0 | Status: DISCONTINUED | OUTPATIENT
Start: 2018-06-26 | End: 2018-06-26

## 2018-06-26 RX ORDER — SIMVASTATIN 20 MG/1
1 TABLET, FILM COATED ORAL
Qty: 0 | Refills: 0 | COMMUNITY

## 2018-06-26 RX ADMIN — HEPARIN SODIUM 5000 UNIT(S): 5000 INJECTION INTRAVENOUS; SUBCUTANEOUS at 16:44

## 2018-06-26 RX ADMIN — HYDROMORPHONE HYDROCHLORIDE 0.5 MILLIGRAM(S): 2 INJECTION INTRAMUSCULAR; INTRAVENOUS; SUBCUTANEOUS at 12:32

## 2018-06-26 RX ADMIN — Medication 2: at 16:44

## 2018-06-26 RX ADMIN — ONDANSETRON 4 MILLIGRAM(S): 8 TABLET, FILM COATED ORAL at 09:10

## 2018-06-26 RX ADMIN — HYDROMORPHONE HYDROCHLORIDE 0.5 MILLIGRAM(S): 2 INJECTION INTRAMUSCULAR; INTRAVENOUS; SUBCUTANEOUS at 16:34

## 2018-06-26 RX ADMIN — Medication 4: at 21:54

## 2018-06-26 RX ADMIN — Medication 100 MILLIGRAM(S): at 16:43

## 2018-06-26 RX ADMIN — GABAPENTIN 300 MILLIGRAM(S): 400 CAPSULE ORAL at 16:34

## 2018-06-26 RX ADMIN — HEPARIN SODIUM 5000 UNIT(S): 5000 INJECTION INTRAVENOUS; SUBCUTANEOUS at 21:54

## 2018-06-26 RX ADMIN — OXYCODONE AND ACETAMINOPHEN 2 TABLET(S): 5; 325 TABLET ORAL at 09:30

## 2018-06-26 RX ADMIN — SODIUM CHLORIDE 1000 MILLILITER(S): 9 INJECTION INTRAMUSCULAR; INTRAVENOUS; SUBCUTANEOUS at 17:11

## 2018-06-26 RX ADMIN — SODIUM CHLORIDE 70 MILLILITER(S): 9 INJECTION INTRAMUSCULAR; INTRAVENOUS; SUBCUTANEOUS at 19:13

## 2018-06-26 RX ADMIN — GABAPENTIN 300 MILLIGRAM(S): 400 CAPSULE ORAL at 21:55

## 2018-06-26 RX ADMIN — Medication 1000 MILLIGRAM(S): at 12:49

## 2018-06-26 RX ADMIN — Medication 100 MILLIGRAM(S): at 23:57

## 2018-06-26 RX ADMIN — Medication 400 MILLIGRAM(S): at 12:32

## 2018-06-26 RX ADMIN — Medication 1000 MILLIGRAM(S): at 20:34

## 2018-06-26 RX ADMIN — HYDROMORPHONE HYDROCHLORIDE 0.5 MILLIGRAM(S): 2 INJECTION INTRAMUSCULAR; INTRAVENOUS; SUBCUTANEOUS at 12:49

## 2018-06-26 RX ADMIN — SIMVASTATIN 40 MILLIGRAM(S): 20 TABLET, FILM COATED ORAL at 21:53

## 2018-06-26 RX ADMIN — Medication 400 MILLIGRAM(S): at 19:34

## 2018-06-26 RX ADMIN — OXYCODONE AND ACETAMINOPHEN 2 TABLET(S): 5; 325 TABLET ORAL at 23:57

## 2018-06-26 RX ADMIN — HYDROMORPHONE HYDROCHLORIDE 0.5 MILLIGRAM(S): 2 INJECTION INTRAMUSCULAR; INTRAVENOUS; SUBCUTANEOUS at 09:13

## 2018-06-26 NOTE — BRIEF OPERATIVE NOTE - PRE-OP DX
Open wound of multiple sites of left lower extremity and thigh without complication, sequela  06/26/2018    Active  Dk Chauhan

## 2018-06-26 NOTE — PROGRESS NOTE ADULT - SUBJECTIVE AND OBJECTIVE BOX
POST-OPERATIVE NOTE    Procedure: split thickness skin graft    Diagnosis/Indication: nonhealing ulcer    Surgeon: Irene    S: Pt has no complaints. Denies CP, SOB. Pain controlled with medication.    O:  T(C): --  T(F): --  HR: 56 (06-26-18 @ 14:00) (50 - 58)  BP: 90/46 (06-26-18 @ 14:00) (89/44 - 97/52)  RR: 18 (06-26-18 @ 14:00) (14 - 20)  SpO2: 100% (06-26-18 @ 14:00) (100% - 100%)  Wt(kg): --            Gen: NAD, resting comfortably in bed  C/V: NSR  Pulm: Nonlabored breathing, no respiratory distress  Abd: soft, NT/ND  Extrem: vac intact and functioning drsg c/d/i thigh drsg- moderately sat with kevin blood      A/P: 68yFemale s/p above procedure  Diet: consistent carb  Pain/nausea control  f/u am labs

## 2018-06-26 NOTE — BRIEF OPERATIVE NOTE - PROCEDURE
<<-----Click on this checkbox to enter Procedure Split thickness autograft of leg  06/26/2018    Active  GELACIO

## 2018-06-26 NOTE — H&P PST ADULT - HISTORY OF PRESENT ILLNESS
67 yo F w/ chronic  LLE wound, s/p surgical debridement and IV antibiotics. Presenting today for split thickness skin graft.

## 2018-06-26 NOTE — BRIEF OPERATIVE NOTE - POST-OP DX
Open wound of multiple sites of left lower extremity and thigh, sequela  06/26/2018    Active  Dk Chauhan

## 2018-06-26 NOTE — H&P PST ADULT - ASSESSMENT
69 yo F s/p surgical debridement of non-healing LLE wound in 5/2018 now here for STSG of affected area    Pain Nausea Control PRN  Home Meds  Insulin Sliding Scale  Vac dressing to new graft  DVT PPX  AM Labs  Strict Bedrest

## 2018-06-27 LAB
ANION GAP SERPL CALC-SCNC: 12 MMOL/L — SIGNIFICANT CHANGE UP (ref 5–17)
BASOPHILS NFR BLD AUTO: 0.5 % — SIGNIFICANT CHANGE UP (ref 0–2)
BUN SERPL-MCNC: 16 MG/DL — SIGNIFICANT CHANGE UP (ref 7–23)
CALCIUM SERPL-MCNC: 8.8 MG/DL — SIGNIFICANT CHANGE UP (ref 8.4–10.5)
CHLORIDE SERPL-SCNC: 107 MMOL/L — SIGNIFICANT CHANGE UP (ref 96–108)
CO2 SERPL-SCNC: 24 MMOL/L — SIGNIFICANT CHANGE UP (ref 22–31)
CREAT SERPL-MCNC: 0.83 MG/DL — SIGNIFICANT CHANGE UP (ref 0.5–1.3)
EOSINOPHIL NFR BLD AUTO: 2.4 % — SIGNIFICANT CHANGE UP (ref 0–6)
GLUCOSE BLDC GLUCOMTR-MCNC: 156 MG/DL — HIGH (ref 70–99)
GLUCOSE BLDC GLUCOMTR-MCNC: 308 MG/DL — HIGH (ref 70–99)
GLUCOSE BLDC GLUCOMTR-MCNC: 318 MG/DL — HIGH (ref 70–99)
GLUCOSE BLDC GLUCOMTR-MCNC: 87 MG/DL — SIGNIFICANT CHANGE UP (ref 70–99)
GLUCOSE SERPL-MCNC: 96 MG/DL — SIGNIFICANT CHANGE UP (ref 70–99)
HBA1C BLD-MCNC: 8.7 % — HIGH (ref 4–5.6)
HCT VFR BLD CALC: 33.8 % — LOW (ref 34.5–45)
HGB BLD-MCNC: 10.2 G/DL — LOW (ref 11.5–15.5)
LYMPHOCYTES # BLD AUTO: 53.8 % — HIGH (ref 13–44)
MAGNESIUM SERPL-MCNC: 1.9 MG/DL — SIGNIFICANT CHANGE UP (ref 1.6–2.6)
MCHC RBC-ENTMCNC: 26.7 PG — LOW (ref 27–34)
MCHC RBC-ENTMCNC: 30.2 G/DL — LOW (ref 32–36)
MCV RBC AUTO: 88.5 FL — SIGNIFICANT CHANGE UP (ref 80–100)
MONOCYTES NFR BLD AUTO: 5.9 % — SIGNIFICANT CHANGE UP (ref 2–14)
NEUTROPHILS NFR BLD AUTO: 37.4 % — LOW (ref 43–77)
PHOSPHATE SERPL-MCNC: 3.9 MG/DL — SIGNIFICANT CHANGE UP (ref 2.5–4.5)
PLATELET # BLD AUTO: 144 K/UL — LOW (ref 150–400)
POTASSIUM SERPL-MCNC: 4.2 MMOL/L — SIGNIFICANT CHANGE UP (ref 3.5–5.3)
POTASSIUM SERPL-SCNC: 4.2 MMOL/L — SIGNIFICANT CHANGE UP (ref 3.5–5.3)
RBC # BLD: 3.82 M/UL — SIGNIFICANT CHANGE UP (ref 3.8–5.2)
RBC # FLD: 15.4 % — SIGNIFICANT CHANGE UP (ref 10.3–16.9)
SODIUM SERPL-SCNC: 143 MMOL/L — SIGNIFICANT CHANGE UP (ref 135–145)
WBC # BLD: 3.7 K/UL — LOW (ref 3.8–10.5)
WBC # FLD AUTO: 3.7 K/UL — LOW (ref 3.8–10.5)

## 2018-06-27 RX ORDER — MAGNESIUM SULFATE 500 MG/ML
2 VIAL (ML) INJECTION ONCE
Qty: 0 | Refills: 0 | Status: COMPLETED | OUTPATIENT
Start: 2018-06-27 | End: 2018-06-27

## 2018-06-27 RX ORDER — GABAPENTIN 400 MG/1
400 CAPSULE ORAL THREE TIMES A DAY
Qty: 0 | Refills: 0 | Status: DISCONTINUED | OUTPATIENT
Start: 2018-06-27 | End: 2018-07-01

## 2018-06-27 RX ORDER — GABAPENTIN 400 MG/1
600 CAPSULE ORAL AT BEDTIME
Qty: 0 | Refills: 0 | Status: DISCONTINUED | OUTPATIENT
Start: 2018-06-27 | End: 2018-07-01

## 2018-06-27 RX ORDER — DIPHENHYDRAMINE HCL 50 MG
25 CAPSULE ORAL ONCE
Qty: 0 | Refills: 0 | Status: COMPLETED | OUTPATIENT
Start: 2018-06-27 | End: 2018-06-27

## 2018-06-27 RX ORDER — DIPHENHYDRAMINE HCL 50 MG
50 CAPSULE ORAL ONCE
Qty: 0 | Refills: 0 | Status: COMPLETED | OUTPATIENT
Start: 2018-06-27 | End: 2018-06-27

## 2018-06-27 RX ADMIN — Medication 8: at 16:45

## 2018-06-27 RX ADMIN — Medication 50 GRAM(S): at 07:54

## 2018-06-27 RX ADMIN — OXYCODONE AND ACETAMINOPHEN 2 TABLET(S): 5; 325 TABLET ORAL at 06:50

## 2018-06-27 RX ADMIN — OXYCODONE AND ACETAMINOPHEN 2 TABLET(S): 5; 325 TABLET ORAL at 00:57

## 2018-06-27 RX ADMIN — OXYCODONE AND ACETAMINOPHEN 2 TABLET(S): 5; 325 TABLET ORAL at 11:45

## 2018-06-27 RX ADMIN — Medication 25 MILLIGRAM(S): at 12:42

## 2018-06-27 RX ADMIN — GABAPENTIN 400 MILLIGRAM(S): 400 CAPSULE ORAL at 13:28

## 2018-06-27 RX ADMIN — SIMVASTATIN 40 MILLIGRAM(S): 20 TABLET, FILM COATED ORAL at 21:08

## 2018-06-27 RX ADMIN — Medication 2: at 11:45

## 2018-06-27 RX ADMIN — OXYCODONE AND ACETAMINOPHEN 2 TABLET(S): 5; 325 TABLET ORAL at 21:07

## 2018-06-27 RX ADMIN — Medication 8: at 22:06

## 2018-06-27 RX ADMIN — OXYCODONE AND ACETAMINOPHEN 2 TABLET(S): 5; 325 TABLET ORAL at 12:37

## 2018-06-27 RX ADMIN — GABAPENTIN 600 MILLIGRAM(S): 400 CAPSULE ORAL at 21:07

## 2018-06-27 RX ADMIN — OXYCODONE AND ACETAMINOPHEN 2 TABLET(S): 5; 325 TABLET ORAL at 17:00

## 2018-06-27 RX ADMIN — GABAPENTIN 300 MILLIGRAM(S): 400 CAPSULE ORAL at 05:50

## 2018-06-27 RX ADMIN — HEPARIN SODIUM 5000 UNIT(S): 5000 INJECTION INTRAVENOUS; SUBCUTANEOUS at 05:50

## 2018-06-27 RX ADMIN — OXYCODONE AND ACETAMINOPHEN 2 TABLET(S): 5; 325 TABLET ORAL at 05:50

## 2018-06-27 RX ADMIN — HEPARIN SODIUM 5000 UNIT(S): 5000 INJECTION INTRAVENOUS; SUBCUTANEOUS at 13:28

## 2018-06-27 RX ADMIN — Medication 100 MILLIGRAM(S): at 07:11

## 2018-06-27 RX ADMIN — Medication 50 MILLIGRAM(S): at 21:07

## 2018-06-27 RX ADMIN — OXYCODONE AND ACETAMINOPHEN 2 TABLET(S): 5; 325 TABLET ORAL at 17:50

## 2018-06-27 RX ADMIN — HEPARIN SODIUM 5000 UNIT(S): 5000 INJECTION INTRAVENOUS; SUBCUTANEOUS at 21:07

## 2018-06-27 RX ADMIN — OXYCODONE AND ACETAMINOPHEN 2 TABLET(S): 5; 325 TABLET ORAL at 22:00

## 2018-06-27 NOTE — PROGRESS NOTE ADULT - SUBJECTIVE AND OBJECTIVE BOX
24hr Events:  O/N: Started on NS@70ml/hr due to hypotension (SBP 90s), d/c Losartan, VSS  6/26: split thickness skin graft with Lt thigh donor site poc ok        Assessment/Plan;  69 yo F s/p surgical debridement of non-healing LLE wound in 5/2018 now s/p  STSG  on LLE wound    Pain Nausea Control PRN  Home Meds  Insulin Sliding Scale  Vac dressing to new graft  DVT PPX  AM Labs  Strict Bedrest 24hr Events:  O/N: Started on NS@70ml/hr due to hypotension (SBP 90s), d/c Losartan, VSS  6/26: split thickness skin graft with Lt thigh donor site poc ok    S. c/o leg pain.  Notes she takes high doses of Gabapentin then she is getting here.     heparin  Injectable 5000      Allergies    No Known Allergies    Intolerances    codeine (Vomiting)      Vital Signs Last 24 Hrs  T(C): 36.3 (27 Jun 2018 05:10), Max: 36.4 (26 Jun 2018 21:48)  T(F): 97.4 (27 Jun 2018 05:10), Max: 97.6 (26 Jun 2018 21:48)  HR: 50 (27 Jun 2018 05:50) (44 - 84)  BP: 110/56 (27 Jun 2018 05:50) (85/43 - 119/57)  BP(mean): 69 (26 Jun 2018 17:05) (60 - 79)  RR: 18 (27 Jun 2018 05:50) (14 - 31)  SpO2: 100% (27 Jun 2018 05:50) (97% - 100%)    Physical Exam:  General: awake, alert, NAD  Pulmonary:  Cardiovascular:  Abdominal:  Extremities: Left thigh donor site. Dressing intact with some fluid collection.  Left lateral lower leg with VAC in place and functioning.   Pulses:   Right:                                                                           Left:  FEM [ ]2+ [ ]1+ [ ] doppler                                            FEM [ ]2+ [ ]1+ [ ] doppler    POP [ ]2+ [ ]1+ [ ] doppler                                            POP [ ]2+ [ ]1+ [ ] doppler    DP [ ]2+ [ ]1+ [ ] doppler                                               DP [ ]2+ [ ]1+ [ ] doppler  PT[ ]2+ [ ]1+ [ ] doppler                                                 PT [ ]2+ [ ]1+ [ ] doppler      LABS:                        10.2   3.7   )-----------( 144      ( 27 Jun 2018 06:36 )             33.8                 RADIOLOGY & ADDITIONAL TESTS:      Assessment/Plan;  67 yo F s/p surgical debridement of non-healing LLE wound in 5/2018 now s/p  STSG  on LLE wound    Pain Nausea Control PRN  Home Meds  Insulin Sliding Scale  Vac dressing to new graft  DVT PPX  AM Labs  Strict Bedrest  Adjust Gabapentin dose.

## 2018-06-28 LAB
APPEARANCE UR: CLEAR — SIGNIFICANT CHANGE UP
BILIRUB UR-MCNC: NEGATIVE — SIGNIFICANT CHANGE UP
COLOR SPEC: YELLOW — SIGNIFICANT CHANGE UP
DIFF PNL FLD: NEGATIVE — SIGNIFICANT CHANGE UP
GLUCOSE BLDC GLUCOMTR-MCNC: 110 MG/DL — HIGH (ref 70–99)
GLUCOSE BLDC GLUCOMTR-MCNC: 148 MG/DL — HIGH (ref 70–99)
GLUCOSE BLDC GLUCOMTR-MCNC: 181 MG/DL — HIGH (ref 70–99)
GLUCOSE BLDC GLUCOMTR-MCNC: 211 MG/DL — HIGH (ref 70–99)
GLUCOSE UR QL: >=1000
KETONES UR-MCNC: NEGATIVE — SIGNIFICANT CHANGE UP
LEUKOCYTE ESTERASE UR-ACNC: NEGATIVE — SIGNIFICANT CHANGE UP
NITRITE UR-MCNC: NEGATIVE — SIGNIFICANT CHANGE UP
PH UR: 6.5 — SIGNIFICANT CHANGE UP (ref 5–8)
PROT UR-MCNC: NEGATIVE MG/DL — SIGNIFICANT CHANGE UP
SP GR SPEC: 1.01 — SIGNIFICANT CHANGE UP (ref 1–1.03)
UROBILINOGEN FLD QL: 0.2 E.U./DL — SIGNIFICANT CHANGE UP

## 2018-06-28 RX ORDER — DIPHENHYDRAMINE HCL 50 MG
50 CAPSULE ORAL EVERY 4 HOURS
Qty: 0 | Refills: 0 | Status: DISCONTINUED | OUTPATIENT
Start: 2018-06-28 | End: 2018-07-01

## 2018-06-28 RX ADMIN — OXYCODONE AND ACETAMINOPHEN 2 TABLET(S): 5; 325 TABLET ORAL at 21:45

## 2018-06-28 RX ADMIN — OXYCODONE AND ACETAMINOPHEN 1 TABLET(S): 5; 325 TABLET ORAL at 12:22

## 2018-06-28 RX ADMIN — Medication 50 MILLIGRAM(S): at 14:55

## 2018-06-28 RX ADMIN — Medication 4: at 16:47

## 2018-06-28 RX ADMIN — OXYCODONE AND ACETAMINOPHEN 2 TABLET(S): 5; 325 TABLET ORAL at 10:22

## 2018-06-28 RX ADMIN — OXYCODONE AND ACETAMINOPHEN 1 TABLET(S): 5; 325 TABLET ORAL at 13:02

## 2018-06-28 RX ADMIN — HEPARIN SODIUM 5000 UNIT(S): 5000 INJECTION INTRAVENOUS; SUBCUTANEOUS at 21:00

## 2018-06-28 RX ADMIN — OXYCODONE AND ACETAMINOPHEN 2 TABLET(S): 5; 325 TABLET ORAL at 20:58

## 2018-06-28 RX ADMIN — Medication 2: at 11:46

## 2018-06-28 RX ADMIN — GABAPENTIN 400 MILLIGRAM(S): 400 CAPSULE ORAL at 14:56

## 2018-06-28 RX ADMIN — Medication 50 MILLIGRAM(S): at 00:14

## 2018-06-28 RX ADMIN — OXYCODONE AND ACETAMINOPHEN 2 TABLET(S): 5; 325 TABLET ORAL at 17:40

## 2018-06-28 RX ADMIN — SIMVASTATIN 40 MILLIGRAM(S): 20 TABLET, FILM COATED ORAL at 21:00

## 2018-06-28 RX ADMIN — GABAPENTIN 600 MILLIGRAM(S): 400 CAPSULE ORAL at 21:00

## 2018-06-28 RX ADMIN — OXYCODONE AND ACETAMINOPHEN 1 TABLET(S): 5; 325 TABLET ORAL at 07:30

## 2018-06-28 RX ADMIN — HEPARIN SODIUM 5000 UNIT(S): 5000 INJECTION INTRAVENOUS; SUBCUTANEOUS at 06:47

## 2018-06-28 RX ADMIN — Medication 50 MILLIGRAM(S): at 09:30

## 2018-06-28 RX ADMIN — OXYCODONE AND ACETAMINOPHEN 2 TABLET(S): 5; 325 TABLET ORAL at 03:41

## 2018-06-28 RX ADMIN — OXYCODONE AND ACETAMINOPHEN 1 TABLET(S): 5; 325 TABLET ORAL at 06:47

## 2018-06-28 RX ADMIN — OXYCODONE AND ACETAMINOPHEN 2 TABLET(S): 5; 325 TABLET ORAL at 09:31

## 2018-06-28 RX ADMIN — OXYCODONE AND ACETAMINOPHEN 2 TABLET(S): 5; 325 TABLET ORAL at 16:47

## 2018-06-28 RX ADMIN — OXYCODONE AND ACETAMINOPHEN 2 TABLET(S): 5; 325 TABLET ORAL at 04:30

## 2018-06-28 RX ADMIN — GABAPENTIN 400 MILLIGRAM(S): 400 CAPSULE ORAL at 06:47

## 2018-06-28 RX ADMIN — HEPARIN SODIUM 5000 UNIT(S): 5000 INJECTION INTRAVENOUS; SUBCUTANEOUS at 14:56

## 2018-06-28 NOTE — PROGRESS NOTE ADULT - SUBJECTIVE AND OBJECTIVE BOX
O/N: ROXANN, VSS pt requested Benadryl x2 for itching                  Assessment/Plan;  67 yo F s/p surgical debridement of non-healing LLE wound in 5/2018 now s/p  STSG  on LLE wound    Pain Nausea Control PRN  Home Meds  Insulin Sliding Scale  Vac dressing to new graft  DVT PPX  AM Labs  Strict Bedrest  Adjust Gabapentin dose. O/N: ROXANN, VSS pt requested Benadryl x2 for itching    S. c/o burning pain in leg.     heparin  Injectable 5000      Allergies    No Known Allergies    Intolerances    codeine (Vomiting)      Vital Signs Last 24 Hrs  T(C): 36.6 (28 Jun 2018 09:26), Max: 36.6 (28 Jun 2018 09:26)  T(F): 97.8 (28 Jun 2018 09:26), Max: 97.8 (28 Jun 2018 09:26)  HR: 68 (28 Jun 2018 09:25) (47 - 87)  BP: 128/59 (28 Jun 2018 09:25) (109/55 - 132/53)  BP(mean): --  RR: 16 (28 Jun 2018 09:25) (16 - 16)  SpO2: 99% (28 Jun 2018 09:25) (97% - 100%)    Physical Exam:  General: awake, alert, NAD  Pulmonary:  Cardiovascular:  Abdominal:  Extremities: Left thigh donor site - dressing intact with some fluid beneath. No oozing. Left lower leg STSG site with VAC in place and functioning.  Pulses:   Right:                                                                           Left:  FEM [ ]2+ [ ]1+ [ ] doppler                                            FEM [ ]2+ [ ]1+ [ ] doppler    POP [ ]2+ [ ]1+ [ ] doppler                                            POP [ ]2+ [ ]1+ [ ] doppler    DP [ ]2+ [ ]1+ [ ] doppler                                               DP [ ]2+ [ ]1+ [ ] doppler  PT[ ]2+ [ ]1+ [ ] doppler                                                 PT [ ]2+ [ ]1+ [ ] doppler      LABS:                        10.2   3.7   )-----------( 144      ( 27 Jun 2018 06:36 )             33.8     06-27    143  |  107  |  16  ----------------------------<  96  4.2   |  24  |  0.83    Ca    8.8      27 Jun 2018 06:36  Phos  3.9     06-27  Mg     1.9     06-27            RADIOLOGY & ADDITIONAL TESTS:                Assessment/Plan;  67 yo F s/p surgical debridement of non-healing LLE wound in 5/2018 now s/p  STSG  on LLE wound    Pain Nausea Control PRN  Home Meds  Insulin Sliding Scale  Vac dressing to new graft  DVT PPX  AM Labs  Strict Bedrest  Adjust Gabapentin dose.   OOB and ambulate  tomorrow. Dressing change tomorrow.   Plan to d/c home tomorrow.

## 2018-06-29 LAB
GLUCOSE BLDC GLUCOMTR-MCNC: 182 MG/DL — HIGH (ref 70–99)
GLUCOSE BLDC GLUCOMTR-MCNC: 211 MG/DL — HIGH (ref 70–99)
GLUCOSE BLDC GLUCOMTR-MCNC: 286 MG/DL — HIGH (ref 70–99)
GLUCOSE BLDC GLUCOMTR-MCNC: 344 MG/DL — HIGH (ref 70–99)

## 2018-06-29 RX ORDER — MORPHINE SULFATE 50 MG/1
2 CAPSULE, EXTENDED RELEASE ORAL ONCE
Qty: 0 | Refills: 0 | Status: DISCONTINUED | OUTPATIENT
Start: 2018-06-29 | End: 2018-06-29

## 2018-06-29 RX ORDER — SENNA PLUS 8.6 MG/1
2 TABLET ORAL ONCE
Qty: 0 | Refills: 0 | Status: COMPLETED | OUTPATIENT
Start: 2018-06-29 | End: 2018-06-29

## 2018-06-29 RX ADMIN — HEPARIN SODIUM 5000 UNIT(S): 5000 INJECTION INTRAVENOUS; SUBCUTANEOUS at 20:48

## 2018-06-29 RX ADMIN — Medication 2: at 07:38

## 2018-06-29 RX ADMIN — HEPARIN SODIUM 5000 UNIT(S): 5000 INJECTION INTRAVENOUS; SUBCUTANEOUS at 12:55

## 2018-06-29 RX ADMIN — OXYCODONE AND ACETAMINOPHEN 2 TABLET(S): 5; 325 TABLET ORAL at 21:00

## 2018-06-29 RX ADMIN — OXYCODONE AND ACETAMINOPHEN 2 TABLET(S): 5; 325 TABLET ORAL at 13:46

## 2018-06-29 RX ADMIN — OXYCODONE AND ACETAMINOPHEN 2 TABLET(S): 5; 325 TABLET ORAL at 00:42

## 2018-06-29 RX ADMIN — Medication 6: at 11:43

## 2018-06-29 RX ADMIN — Medication 8: at 21:59

## 2018-06-29 RX ADMIN — OXYCODONE AND ACETAMINOPHEN 2 TABLET(S): 5; 325 TABLET ORAL at 20:48

## 2018-06-29 RX ADMIN — SIMVASTATIN 40 MILLIGRAM(S): 20 TABLET, FILM COATED ORAL at 20:48

## 2018-06-29 RX ADMIN — OXYCODONE AND ACETAMINOPHEN 1 TABLET(S): 5; 325 TABLET ORAL at 09:50

## 2018-06-29 RX ADMIN — OXYCODONE AND ACETAMINOPHEN 2 TABLET(S): 5; 325 TABLET ORAL at 01:30

## 2018-06-29 RX ADMIN — GABAPENTIN 400 MILLIGRAM(S): 400 CAPSULE ORAL at 05:28

## 2018-06-29 RX ADMIN — OXYCODONE AND ACETAMINOPHEN 2 TABLET(S): 5; 325 TABLET ORAL at 06:25

## 2018-06-29 RX ADMIN — MORPHINE SULFATE 2 MILLIGRAM(S): 50 CAPSULE, EXTENDED RELEASE ORAL at 10:50

## 2018-06-29 RX ADMIN — GABAPENTIN 400 MILLIGRAM(S): 400 CAPSULE ORAL at 12:55

## 2018-06-29 RX ADMIN — OXYCODONE AND ACETAMINOPHEN 1 TABLET(S): 5; 325 TABLET ORAL at 08:52

## 2018-06-29 RX ADMIN — Medication 4: at 17:02

## 2018-06-29 RX ADMIN — OXYCODONE AND ACETAMINOPHEN 2 TABLET(S): 5; 325 TABLET ORAL at 12:55

## 2018-06-29 RX ADMIN — SENNA PLUS 2 TABLET(S): 8.6 TABLET ORAL at 05:29

## 2018-06-29 RX ADMIN — OXYCODONE AND ACETAMINOPHEN 2 TABLET(S): 5; 325 TABLET ORAL at 05:28

## 2018-06-29 RX ADMIN — HEPARIN SODIUM 5000 UNIT(S): 5000 INJECTION INTRAVENOUS; SUBCUTANEOUS at 05:28

## 2018-06-29 RX ADMIN — GABAPENTIN 600 MILLIGRAM(S): 400 CAPSULE ORAL at 20:48

## 2018-06-29 RX ADMIN — MORPHINE SULFATE 2 MILLIGRAM(S): 50 CAPSULE, EXTENDED RELEASE ORAL at 10:35

## 2018-06-29 NOTE — PROVIDER CONTACT NOTE (OTHER) - ACTION/TREATMENT ORDERED:
pt is okay to receive 1000mg  of gabapentin  it is okay to change thigh dressing with xerofoam and tegaderm

## 2018-06-29 NOTE — PROVIDER CONTACT NOTE (OTHER) - RECOMMENDATIONS
will change dressing if okay with vascular  will give 1000mg of gabapentin if okay with vascular and medication not ordered in error

## 2018-06-29 NOTE — PROGRESS NOTE ADULT - SUBJECTIVE AND OBJECTIVE BOX
O/N: ROXANN, VSS                  Assessment/Plan;  67 yo F s/p surgical debridement of non-healing LLE wound in 5/2018 now s/p  STSG  on LLE wound    Pain Nausea Control PRN  Home Meds  Insulin Sliding Scale  Vac dressing to new graft  DVT PPX  AM Labs  Strict Bedrest  Adjust Gabapentin dose.   OOB and ambulate  tomorrow. Dressing change tomorrow.   Plan to d/c home today. O/N: ROXANN, VSS    heparin  Injectable 5000      Allergies    No Known Allergies    Intolerances    codeine (Vomiting)      Vital Signs Last 24 Hrs  T(C): 37.4 (29 Jun 2018 05:40), Max: 37.4 (29 Jun 2018 05:40)  T(F): 99.4 (29 Jun 2018 05:40), Max: 99.4 (29 Jun 2018 05:40)  HR: 59 (29 Jun 2018 08:47) (50 - 67)  BP: 136/63 (29 Jun 2018 08:47) (130/60 - 163/77)  BP(mean): --  RR: 16 (29 Jun 2018 08:47) (16 - 16)  SpO2: 97% (29 Jun 2018 08:47) (96% - 100%)  I&O's Summary    28 Jun 2018 07:01  -  29 Jun 2018 07:00  --------------------------------------------------------  IN: 180 mL / OUT: 600 mL / NET: -420 mL    29 Jun 2018 07:01  -  29 Jun 2018 09:32  --------------------------------------------------------  IN: 180 mL / OUT: 0 mL / NET: 180 mL      Physical Exam:  General: awake, alert, NAD  Pulmonary:  Cardiovascular:  Abdominal:  Extremities: Left thigh donor site - dressing intact with some fluid beneath. No oozing. Left lower leg STSG site with VAC in place and functioning.                Radiology and Additional Studies:              Assessment/Plan;  69 yo F s/p surgical debridement of non-healing LLE wound in 5/2018 now s/p  STSG  on LLE wound    Pain Nausea Control PRN  Home Meds  Insulin Sliding Scale  Vac dressing to new graft  DVT PPX  AM Labs  OOB with PT today  OOB and ambulate  tomorrow. Dressing change today.  Plan to d/c home today

## 2018-06-29 NOTE — PHYSICAL THERAPY INITIAL EVALUATION ADULT - ADDITIONAL COMMENTS
Pt lives alone in an apartment with "a few" steps to enter from the outside. Ambulates with a RW at baseline. Has [facility] M/W/F for 3hrs at a time.

## 2018-06-29 NOTE — PHYSICAL THERAPY INITIAL EVALUATION ADULT - GAIT DEVIATIONS NOTED, PT EVAL
antalgic gait, dec stance time LLE, dec step length RLE/decreased stride length/decreased weight-shifting ability/decreased jhoan/increased time in double stance/decreased step length

## 2018-06-29 NOTE — PHYSICAL THERAPY INITIAL EVALUATION ADULT - IMPAIRMENTS CONTRIBUTING TO GAIT DEVIATIONS, PT EVAL
Physical Therapy Daily Treatment     Visit Count: 4  Plan of Care Dates: Initial: 9/11/17 Through: 10/23/17  Insurance Information: physical and speech therapy combined cap of $1980/$3700 per calendar year, $682.41 used at the time of evaluation  Next Referring Provider Visit: 11/28/17    Referred by: Afua Irizarry MD  Medical Diagnosis (from order):    Peripheral edema [R60.9] Â - Primary       Lymphedema [I89.0]           Insurance: 1. MEDICARE  2. [employer]    Date of Onset: chronic edema , pt relates to hereditary ( mother had)   Diagnosis Precautions: lymphedema and back stenosis  Chart reviewed: Relevant co-morbidities, allergies, tests and medications: high blood pressure, thyroid problem, spinal stenosis, obesity, bilateral knee and hip replacements     SUBJECTIVE   Back and right hip are hurting a lot today. Bandages off since yesterday morning. Current Pain:  Back and right hip is 8/10. Â   Functional Change: can't bend over today. Has not taken water pill in 3 days    OBJECTIVE   Lymphedema Measurements:  Lymphedema Circumference (cm): Lower Extremity  Date 9/11 9/11 L>R 9/22Â  9/26   Landmarks   left right diff Â left left   60 cm Â  Â  Â  Â     50 cm Â  Â  Â  Â     40 cm 56.5 53.2 3.3 Â     30 cm 47.5 41 6.5 45.5Â  44   20 cm-calf 46.9 41.5 5.4 38Â  42   10 cm 34.2 32.5 1.9 29Â  32.7   malleolus 32.2 27.2 5.0 24Â  30.4   Ankle-instep 34.2 33 1.2 Â     arch 25 24 1.0 Â 21.3 23.7   Total Â  Â  Â            Treatment     Manual Lymphatic Drainage / Manual Therapy:  Proximal clearing and re-routing focus on deep abdominals to facilitate lymphatic flow and drainage of bilateral legs.     Therapeutic Exercise: to promote lymphatic drainage- 10 reps bilateral  Deep diaphragmatic breathing  Ankle pumps and circles  Heel slides- 7 reps on left ( stopped due to hip pain)  Long arc quad- bilateral 10 reps  Seated calf raises  Standing calf raises  Standing march   Nu step level 4 ( seat 8) for 5 min- pt got "SOB    Compression:  Multiple layer compression wrapping:   Layer 1: stockinette  Layer 2: cotton padding  Layer 3: open cell foam  Layer 4: short stretch bandaging 8 cm X 2 and one 10 cm      Right leg with elastic stockinet and tape only- trial sample Juzo size IV to right leg- good fit but concerned about donning and doffing. Did not leave on due to state of back pain but pt going to trial as well as consider custom for left leg. Able to show pt material and style of Elvarex. Elastic tape- 3"" power lifts to dorsum feet     Current Home Program (not performed this date except as noted above): Ankle, knee exercises and self pumping of genu and inguinal lymph nodes  Deep diaphragmatic breathing  ASSESSMENT   Significant edema return in left with 1 day without  bandaging. Stockings from home do not fit ( too small and long). Able to fit pt with stockings but main concern is donning and doffing. Pt to try once back calms down. Pain after treatment: 6/10  Result of above outlined education: Verbalizes understanding and Needs reinforcement    Goals:       See evaluation     PLAN   Continue manual therapy, compression and  add to exercise. Assess ability to get  Stockings on and off. THERAPY DAILY BILLING   Primary Insurance: MEDICARE  Secondary Insurance: [employer]    Evaluation Procedures:  No evaluation codes were used on this date of service    Timed Procedures:  Manual Therapy, 55 minutes    Untimed Procedures:  No untimed codes were used on this date of service    Total Treatment Time: 55 minutes        G-Code:  G-Code Score ABN form  reporting not required this treatment session  Modifier based on outcome measure(s)/functional testing/clinical judgement as listed above    The referring provider's electronic or written signature on the evaluation authorizes the therapy plan of care and certifies the need for these services, furnished under this plan of care while under their care.   Physician " Signature on file. pain/decreased aerobic capacity/endurance/decreased strength

## 2018-06-29 NOTE — PROVIDER CONTACT NOTE (OTHER) - SITUATION
L Thigh Dressing is saturated.  pt also has two gabapentin orders ( 600mg at bedtime, 400mg x3 a day)

## 2018-06-29 NOTE — PHYSICAL THERAPY INITIAL EVALUATION ADULT - GAIT DISTANCE, PT EVAL
10 feet/+5 feet; Distance limited due to pt reporting dizziness in standing, VSS upon return to supine

## 2018-06-30 ENCOUNTER — TRANSCRIPTION ENCOUNTER (OUTPATIENT)
Age: 68
End: 2018-06-30

## 2018-06-30 LAB
GLUCOSE BLDC GLUCOMTR-MCNC: 159 MG/DL — HIGH (ref 70–99)
GLUCOSE BLDC GLUCOMTR-MCNC: 210 MG/DL — HIGH (ref 70–99)
GLUCOSE BLDC GLUCOMTR-MCNC: 277 MG/DL — HIGH (ref 70–99)
GLUCOSE BLDC GLUCOMTR-MCNC: 323 MG/DL — HIGH (ref 70–99)

## 2018-06-30 RX ORDER — INSULIN GLARGINE 100 [IU]/ML
5 INJECTION, SOLUTION SUBCUTANEOUS AT BEDTIME
Qty: 0 | Refills: 0 | Status: DISCONTINUED | OUTPATIENT
Start: 2018-06-30 | End: 2018-07-01

## 2018-06-30 RX ORDER — BACITRACIN ZINC 500 UNIT/G
1 OINTMENT IN PACKET (EA) TOPICAL ONCE
Qty: 0 | Refills: 0 | Status: DISCONTINUED | OUTPATIENT
Start: 2018-06-30 | End: 2018-07-01

## 2018-06-30 RX ADMIN — OXYCODONE AND ACETAMINOPHEN 2 TABLET(S): 5; 325 TABLET ORAL at 02:14

## 2018-06-30 RX ADMIN — INSULIN GLARGINE 5 UNIT(S): 100 INJECTION, SOLUTION SUBCUTANEOUS at 22:45

## 2018-06-30 RX ADMIN — GABAPENTIN 600 MILLIGRAM(S): 400 CAPSULE ORAL at 22:55

## 2018-06-30 RX ADMIN — Medication 2: at 07:24

## 2018-06-30 RX ADMIN — GABAPENTIN 400 MILLIGRAM(S): 400 CAPSULE ORAL at 06:29

## 2018-06-30 RX ADMIN — Medication 6: at 16:48

## 2018-06-30 RX ADMIN — GABAPENTIN 400 MILLIGRAM(S): 400 CAPSULE ORAL at 13:34

## 2018-06-30 RX ADMIN — OXYCODONE AND ACETAMINOPHEN 2 TABLET(S): 5; 325 TABLET ORAL at 12:12

## 2018-06-30 RX ADMIN — HEPARIN SODIUM 5000 UNIT(S): 5000 INJECTION INTRAVENOUS; SUBCUTANEOUS at 13:34

## 2018-06-30 RX ADMIN — OXYCODONE AND ACETAMINOPHEN 2 TABLET(S): 5; 325 TABLET ORAL at 22:52

## 2018-06-30 RX ADMIN — Medication 4: at 22:30

## 2018-06-30 RX ADMIN — OXYCODONE AND ACETAMINOPHEN 2 TABLET(S): 5; 325 TABLET ORAL at 02:59

## 2018-06-30 RX ADMIN — HEPARIN SODIUM 5000 UNIT(S): 5000 INJECTION INTRAVENOUS; SUBCUTANEOUS at 06:29

## 2018-06-30 RX ADMIN — SIMVASTATIN 40 MILLIGRAM(S): 20 TABLET, FILM COATED ORAL at 22:55

## 2018-06-30 RX ADMIN — Medication 8: at 12:13

## 2018-06-30 RX ADMIN — OXYCODONE AND ACETAMINOPHEN 2 TABLET(S): 5; 325 TABLET ORAL at 06:30

## 2018-06-30 RX ADMIN — OXYCODONE AND ACETAMINOPHEN 2 TABLET(S): 5; 325 TABLET ORAL at 13:12

## 2018-06-30 RX ADMIN — HEPARIN SODIUM 5000 UNIT(S): 5000 INJECTION INTRAVENOUS; SUBCUTANEOUS at 22:55

## 2018-06-30 RX ADMIN — OXYCODONE AND ACETAMINOPHEN 2 TABLET(S): 5; 325 TABLET ORAL at 23:30

## 2018-06-30 RX ADMIN — OXYCODONE AND ACETAMINOPHEN 2 TABLET(S): 5; 325 TABLET ORAL at 16:47

## 2018-06-30 NOTE — DISCHARGE NOTE ADULT - CARE PLAN
Principal Discharge DX:	Ulcer of left lower extremity, unspecified ulcer stage  Goal:	Healed ulcer  Assessment and plan of treatment:	Please see Dr. Bonilla in office next week. No Abx needed. Local wound care as described below.

## 2018-06-30 NOTE — DISCHARGE NOTE ADULT - PLAN OF CARE
Healed ulcer Please see Dr. Bonilla in office next week. No Abx needed. Local wound care as described below.

## 2018-06-30 NOTE — PROGRESS NOTE ADULT - SUBJECTIVE AND OBJECTIVE BOX
O/N: ROXANN, Temp spike 100.5                    Assessment/Plan;  67 yo F s/p surgical debridement of non-healing LLE wound in 5/2018 now s/p  STSG  on LLE wound    Pain Nausea Control PRN  Home Meds  Insulin Sliding Scale  Vac dressing to new graft  DVT PPX  AM Labs  OOB with PT today  OOB and ambulate with PT O/N: ROXANN, Temp spike 100.5    heparin  Injectable 5000      Allergies    No Known Allergies    Intolerances    codeine (Vomiting)      Vital Signs Last 24 Hrs  T(C): 36.6 (30 Jun 2018 06:54), Max: 38.1 (29 Jun 2018 21:45)  T(F): 97.9 (30 Jun 2018 06:54), Max: 100.5 (29 Jun 2018 21:45)  HR: 68 (30 Jun 2018 08:30) (61 - 89)  BP: 110/54 (30 Jun 2018 08:30) (108/54 - 144/63)  BP(mean): --  RR: 16 (30 Jun 2018 08:30) (16 - 16)  SpO2: 98% (30 Jun 2018 08:30) (95% - 98%)  I&O's Summary    29 Jun 2018 07:01  -  30 Jun 2018 07:00  --------------------------------------------------------  IN: 720 mL / OUT: 0 mL / NET: 720 mL        Physical Exam:  General: AAOx3, NAD  Pulmonary:  Cardiovascular:  Abdominal:  Extremities: left thigh (donor site): pink wound bed, no slough/drainage. Left lateral malleolus skin graft intact, took well, no drainage/swelling, less tender         Assessment/Plan;  69 yo F s/p surgical debridement of non-healing LLE wound in 5/2018 now s/p  STSG  on LLE wound    Pain Nausea Control PRN  Home Meds  Insulin Sliding Scale  Graft care : bacitracin, xeroform, kerlix  DVT PPX  AM Labs  OOB with PT today  PT re-eval

## 2018-06-30 NOTE — DISCHARGE NOTE ADULT - FINDINGS/TREATMENT
Wound care: Left thigh (donor site): may shower, let water run over wound, no rubbing. Once tape comes off, leave xeroform gauze on until peels off on its own. No need to change daily. may cover with gauze if draining.  Left ankle wound: skin graft site: clean daily with saline, apply bacitracin, xeroform and wrap with kerlix. Peel xeroform carefully not peel off the skin graft.

## 2018-06-30 NOTE — DISCHARGE NOTE ADULT - PATIENT PORTAL LINK FT
You can access the [facility] Patient Portal, offered by [facility], by registering with the following website: [email or web address]

## 2018-06-30 NOTE — DISCHARGE NOTE ADULT - CARE PROVIDER_API CALL
Monica Bonilla), Surgery; Vascular Surgery  [address]  [address]  [place], [place] [address]  Phone: [phone]  Fax: [phone]

## 2018-06-30 NOTE — DISCHARGE NOTE ADULT - HOSPITAL COURSE
67 yo F presented w/ chronic LLE wound and was admitted for surgical debridement and IV antibiotics, s/p split thickness skin graft (LT thigh donor) on 6/26/18. Pt tolerated the procedure well. Overnight on POD#0, pt became hypotensive (SBP 90s) and was given NS, after which her vital signs stabilized. Wound vac was removed on POD #3, skin graft looked good.   Overnight on POD#3, pt had a fever of 100.5, but normalized at next reading without intervention. On POD#4, the LT thigh wound was re-inforced and the PT was given 5U of lantus.    Pt and patient's daughter were educated on wound care. Will be discharged with home care scheduled for Tue/Thu or Tue/Fri.

## 2018-06-30 NOTE — DISCHARGE NOTE ADULT - MEDICATION SUMMARY - MEDICATIONS TO TAKE
I will START or STAY ON the medications listed below when I get home from the hospital:    Advil 200 mg oral tablet  -- Indication: For pain medication    Percocet 5/325 oral tablet  -- 1 tab(s) by mouth every 6 hours, As Needed -for severe pain MDD:6 tabs   -- Caution federal law prohibits the transfer of this drug to any person other  than the person for whom it was prescribed.  May cause drowsiness.  Alcohol may intensify this effect.  Use care when operating dangerous machinery.  This prescription cannot be refilled.  This product contains acetaminophen.  Do not use  with any other product containing acetaminophen to prevent possible liver damage.  Using more of this medication than prescribed may cause serious breathing problems.    -- Indication: For Pain medication    Cozaar 100 mg oral tablet  -- 1 tab(s) by mouth once a day  -- Indication: For Hypertension    irbesartan  -- 300 milligram(s) by mouth once a day  -- Indication: For Hypertension    gabapentin 300 mg oral capsule  -- 1 cap(s) by mouth 3 times a day  -- Indication: For Diabetes    metFORMIN 1000 mg oral tablet  -- orally once a day  -- Indication: For Diabetes    pioglitazone  -- 45 milligram(s) by mouth once a day  -- Indication: For Diabetes    glipiZIDE 5 mg oral tablet  -- 1 tab(s) by mouth once a day  -- Indication: For Diabetes    simvastatin 40 mg oral tablet  -- 1 tab(s) by mouth once a day (at bedtime)  -- Indication: For Cholesterol I will START or STAY ON the medications listed below when I get home from the hospital:    Advil 200 mg oral tablet  -- Indication: For pain medication    Percocet 5/325 oral tablet  -- 1 tab(s) by mouth every 6 hours, As Needed -for severe pain MDD:6 tabs   -- Caution federal law prohibits the transfer of this drug to any person other  than the person for whom it was prescribed.  May cause drowsiness.  Alcohol may intensify this effect.  Use care when operating dangerous machinery.  This prescription cannot be refilled.  This product contains acetaminophen.  Do not use  with any other product containing acetaminophen to prevent possible liver damage.  Using more of this medication than prescribed may cause serious breathing problems.    -- Indication: For Pain medication    Cozaar 100 mg oral tablet  -- 1 tab(s) by mouth once a day  -- Indication: For Hypertension    irbesartan  -- 300 milligram(s) by mouth once a day  -- Indication: For Hypertension    gabapentin 300 mg oral capsule  -- 1 cap(s) by mouth 3 times a day  -- Indication: For Pain    metFORMIN 1000 mg oral tablet  -- orally once a day  -- Indication: For Diabetes    pioglitazone  -- 45 milligram(s) by mouth once a day  -- Indication: For Diabetes    glipiZIDE 5 mg oral tablet  -- 1 tab(s) by mouth once a day  -- Indication: For Diabetes    simvastatin 40 mg oral tablet  -- 1 tab(s) by mouth once a day (at bedtime)  -- Indication: For Cholesterol

## 2018-07-01 VITALS
RESPIRATION RATE: 16 BRPM | DIASTOLIC BLOOD PRESSURE: 60 MMHG | HEART RATE: 85 BPM | SYSTOLIC BLOOD PRESSURE: 120 MMHG | OXYGEN SATURATION: 98 %

## 2018-07-01 LAB
GLUCOSE BLDC GLUCOMTR-MCNC: 177 MG/DL — HIGH (ref 70–99)
GLUCOSE BLDC GLUCOMTR-MCNC: 348 MG/DL — HIGH (ref 70–99)

## 2018-07-01 PROCEDURE — 81003 URINALYSIS AUTO W/O SCOPE: CPT

## 2018-07-01 PROCEDURE — 82962 GLUCOSE BLOOD TEST: CPT

## 2018-07-01 PROCEDURE — 84100 ASSAY OF PHOSPHORUS: CPT

## 2018-07-01 PROCEDURE — 83735 ASSAY OF MAGNESIUM: CPT

## 2018-07-01 PROCEDURE — 97116 GAIT TRAINING THERAPY: CPT

## 2018-07-01 PROCEDURE — 97161 PT EVAL LOW COMPLEX 20 MIN: CPT

## 2018-07-01 PROCEDURE — 85025 COMPLETE CBC W/AUTO DIFF WBC: CPT

## 2018-07-01 PROCEDURE — 80048 BASIC METABOLIC PNL TOTAL CA: CPT

## 2018-07-01 PROCEDURE — 36415 COLL VENOUS BLD VENIPUNCTURE: CPT

## 2018-07-01 PROCEDURE — 83036 HEMOGLOBIN GLYCOSYLATED A1C: CPT

## 2018-07-01 RX ORDER — GABAPENTIN 400 MG/1
1 CAPSULE ORAL
Qty: 90 | Refills: 0 | OUTPATIENT
Start: 2018-07-01

## 2018-07-01 RX ORDER — ONDANSETRON 8 MG/1
4 TABLET, FILM COATED ORAL ONCE
Qty: 0 | Refills: 0 | Status: DISCONTINUED | OUTPATIENT
Start: 2018-07-01 | End: 2018-07-01

## 2018-07-01 RX ORDER — CANAGLIFLOZIN 100 MG/1
1 TABLET, FILM COATED ORAL
Qty: 0 | Refills: 0 | COMMUNITY

## 2018-07-01 RX ORDER — ASPIRIN/CALCIUM CARB/MAGNESIUM 324 MG
81 TABLET ORAL DAILY
Qty: 0 | Refills: 0 | Status: DISCONTINUED | OUTPATIENT
Start: 2018-07-01 | End: 2018-07-01

## 2018-07-01 RX ADMIN — OXYCODONE AND ACETAMINOPHEN 2 TABLET(S): 5; 325 TABLET ORAL at 03:07

## 2018-07-01 RX ADMIN — OXYCODONE AND ACETAMINOPHEN 2 TABLET(S): 5; 325 TABLET ORAL at 07:06

## 2018-07-01 RX ADMIN — HEPARIN SODIUM 5000 UNIT(S): 5000 INJECTION INTRAVENOUS; SUBCUTANEOUS at 07:07

## 2018-07-01 RX ADMIN — Medication 81 MILLIGRAM(S): at 12:17

## 2018-07-01 RX ADMIN — GABAPENTIN 400 MILLIGRAM(S): 400 CAPSULE ORAL at 07:07

## 2018-07-01 RX ADMIN — OXYCODONE AND ACETAMINOPHEN 2 TABLET(S): 5; 325 TABLET ORAL at 12:17

## 2018-07-01 RX ADMIN — Medication 2: at 07:07

## 2018-07-01 RX ADMIN — Medication 8: at 12:12

## 2018-07-01 RX ADMIN — OXYCODONE AND ACETAMINOPHEN 2 TABLET(S): 5; 325 TABLET ORAL at 13:17

## 2018-07-01 NOTE — DIETITIAN INITIAL EVALUATION ADULT. - ENERGY NEEDS
IBW: 61kg  %IBW: 128%  BMI: 26.9kg/m2--indicates overweight  Energy needs based on IBW due to patients weight >120% of IBW

## 2018-07-01 NOTE — PROGRESS NOTE ADULT - SUBJECTIVE AND OBJECTIVE BOX
O/N: ROXANN, VSS                              69 yo F w/ chronic  LLE wound, s/p surgical debridement and IV antibiotics. S/p split thickness skin graft on 6/26/18    Pain control  Local wound care  Home meds  OOB and ambulating  ISS  PT re-eval O/N: ROXANN, VSS    Subjective: Patient in no acute distress. Complains of some itching but as given Benadryl overnight, which helped. Complained of some nausea, given Zofran PO. No complaints otherwise. Pain is well controlled      Vital Signs Last 24 Hrs  T(C): 36.1 (01 Jul 2018 08:48), Max: 36.8 (01 Jul 2018 00:20)  T(F): 97 (01 Jul 2018 08:48), Max: 98.3 (01 Jul 2018 00:20)  HR: 85 (01 Jul 2018 12:28) (53 - 85)  BP: 120/60 (01 Jul 2018 12:28) (111/56 - 133/60)  BP(mean): --  RR: 16 (01 Jul 2018 12:28) (16 - 18)  SpO2: 98% (01 Jul 2018 12:28) (98% - 98%)    I&O's Summary    30 Jun 2018 07:01  -  01 Jul 2018 07:00  --------------------------------------------------------  IN: 360 mL / OUT: 0 mL / NET: 360 mL        Physical Exam:  General: NAD, resting comfortably  Pulmonary: normal resp effort  Extremities: LT thigh donor site covered with tegaderm, pink, warm, no leakage; Graft site C/D/I  Neuro: A/O x 3      CAPILLARY BLOOD GLUCOSE      POCT Blood Glucose.: 348 mg/dL (01 Jul 2018 11:05)  POCT Blood Glucose.: 177 mg/dL (01 Jul 2018 07:00)  POCT Blood Glucose.: 210 mg/dL (30 Jun 2018 21:32)  POCT Blood Glucose.: 277 mg/dL (30 Jun 2018 16:25)      RADIOLOGY & ADDITIONAL TESTS:        67 yo F w/ chronic  LLE wound, s/p surgical debridement and IV antibiotics. S/p split thickness skin graft on 6/26/18    - D/C home today with home care  - Pt and family educated on proper wound care  - As per case management, home care will visit Tue/Thu or Tue/Fri  - Continue Pain control  - Local wound care  - Home meds  - OOB and ambulating  - ISS  - f/u PT recs

## 2018-07-01 NOTE — DIETITIAN INITIAL EVALUATION ADULT. - OTHER INFO
Patient seen per length of stay protocol. Consistent CHO diet remains appropriate at this time. PO intake 75% of meals, denies N/V/D/C at this time. S/p wound debridement with STSG. NKFA.

## 2018-07-06 ENCOUNTER — APPOINTMENT (OUTPATIENT)
Dept: VASCULAR SURGERY | Facility: CLINIC | Age: 68
End: 2018-07-06
Payer: COMMERCIAL

## 2018-07-06 DIAGNOSIS — I10 ESSENTIAL (PRIMARY) HYPERTENSION: ICD-10-CM

## 2018-07-06 DIAGNOSIS — I95.81 POSTPROCEDURAL HYPOTENSION: ICD-10-CM

## 2018-07-06 DIAGNOSIS — E11.622 TYPE 2 DIABETES MELLITUS WITH OTHER SKIN ULCER: ICD-10-CM

## 2018-07-06 DIAGNOSIS — E78.5 HYPERLIPIDEMIA, UNSPECIFIED: ICD-10-CM

## 2018-07-06 DIAGNOSIS — S81.802A UNSPECIFIED OPEN WOUND, LEFT LOWER LEG, INITIAL ENCOUNTER: ICD-10-CM

## 2018-07-06 DIAGNOSIS — Z79.84 LONG TERM (CURRENT) USE OF ORAL HYPOGLYCEMIC DRUGS: ICD-10-CM

## 2018-07-06 DIAGNOSIS — L97.329 NON-PRESSURE CHRONIC ULCER OF LEFT ANKLE WITH UNSPECIFIED SEVERITY: ICD-10-CM

## 2018-07-06 PROCEDURE — 99024 POSTOP FOLLOW-UP VISIT: CPT

## 2018-08-10 ENCOUNTER — TRANSCRIPTION ENCOUNTER (OUTPATIENT)
Age: 68
End: 2018-08-10

## 2018-10-05 ENCOUNTER — APPOINTMENT (OUTPATIENT)
Dept: VASCULAR SURGERY | Facility: CLINIC | Age: 68
End: 2018-10-05
Payer: COMMERCIAL

## 2018-10-05 PROCEDURE — 99213 OFFICE O/P EST LOW 20 MIN: CPT

## 2019-02-13 NOTE — PATIENT PROFILE ADULT. - CENTRAL VENOUS CATHETER
Contacted the patient on this afternoon in relation to her ICD follow up.  Patient stated she wants to continue to be followed by Dr. Christine as it relates to her ICD.  Informed the patient of her upcoming remote ICD home monitor device check on 2/26/19 and that she does Not need to come into the clinic on that day as to this is a remote appointment.  Patient instructed to keep her remote monitor connected in the same room where she sleeps all of the time.  Patient verbalized understanding to all of the above.    no

## 2019-06-17 NOTE — ED ADULT TRIAGE NOTE - NS ED TRIAGE AVPU SCALE
Alert-The patient is alert, awake and responds to voice. The patient is oriented to time, place, and person. The triage nurse is able to obtain subjective information.
Unknown if ever smoked

## 2019-11-25 NOTE — ED ADULT TRIAGE NOTE - BANDS:
reached out to Dr. Nielsen's office to ensure that the fax we sent for a referral on [facility] was received.  Their office stated that it can take up to 48 hours to receive those faxes.  They will ana laura this patient as an urgent referral.  Anar left call back number with staff if they have any more questions.    
Explained the situation to Shanelle. She wanted us to find out when they could be seen at [facility]. Spoke with the Neurosurgery office of Dr. Nielsen who has appointments for new patients available next week and the week after.    Shanelle would like us to move forward with the referral to be seen [facility]. She will call to register Bill. Writer completed the referral form and faxed it directly to the neurosurgery office. They were given Bill's information in advance so they are expecting this faxed referral. She should expect a call from their office on Monday to schedule.    [facility] is able to see our records on Care Everywhere and utilize PACS to view imaging that was done here.    We let Shanelle and [facility] know that we are willing to help so that his consult with Dr. Nielsen can be expedited. No further questions at this time.  
Saurabh Hunter, director, called Lorne to let him know that at this time we are unable to perform the surgery that he signed consent for, as our surgeon who specialized in the procedure, Dr. Spencer, is no longer with [facility].    We have been referring patients with trigeminal neuralgia to a provider at [facility]. We believe there is also someone at [facility] who performs this surgery, but we do not know who that is specifically and therefore can't recommend a particular surgeon.    We do have a surgeon starting with our group in January who may be able to perform the surgery, however Saurabh would like to check with him on Lorne's case individually prior to offering him as an option. This would not be able to happen until the New Year however.    Lorne was frustrated with the situation and wanted to take time speak with his wife. He plans to reach out to Saurabh on Monday with a decision. Direct call back number provided.  
Shanelle called nurse line to find out what happened. She just got off the phone with her  Lorne, who was extremely upset. She would like more information from us so that she can help him make decisions about what to do next. Saurabh was unavailable at this time- we will call her back later this afternoon.  
Fall Risk;

## 2020-02-24 NOTE — ED ADULT TRIAGE NOTE - NSWEIGHTCALCTOOLDRUG_GEN_A_CORE
General: Well developed; well nourished; somnolent but arousable and answering questions appropriately  Eyes: PERRLA, EOM intact; conjunctiva and sclera clear  Head: Normocephalic; atraumatic  ENMT: External ear normal, tympanic membranes intact, nasal mucosa normal, no nasal discharge; airway clear, oropharynx clear  Neck: Supple; non tender; No cervical adenopathy  Respiratory: Kussmaul breathing, no chest wall deformity, clear to auscultation bilaterally  Cardiovascular: Regular rate and rhythm. S1 and S2 Normal; systolic murmur, gallops or rubs  Abdominal: Soft tender throughout, non-distended; normal bowel sounds; no hepatosplenomegaly; no masses  Genitourinary: Normal  Rectal: No masses or lesions  Extremities: Full range of motion, no tenderness, no cyanosis or edema  Vascular: Cap refill <2s  Neurological: Alert, affect appropriate, somnolent. Answering questions appropriately. Obeying commands. Able to move from stretched to CT bed without issue.  Skin: Warm and dry. No acute rash, no subcutaneous nodules
 used

## 2021-08-13 NOTE — ED PROVIDER NOTE - NS ED MD DISPO ADMIT [facility] PALLIATIVE CARE
Patient Education     Tips to Control Acid Reflux    To control acid reflux, you ll need to make some basic diet and lifestyle changes. The simple steps outlined below may be all you ll need to ease discomfort.   Watch what you eat    Don't have fatty foods or spicy foods.    Eat fewer acidic foods, such as citrus and tomato-based foods. These can increase symptoms.    Limit drinking alcohol, caffeine, and fizzy beverages. All increase acid reflux.    Try limiting chocolate, peppermint, and spearmint. These can make acid reflux worse in some people.    Watch when you eat    Don't lie down for 3 hours after eating.    Don't snack before going to bed.    Raise your head  Raising your head and upper body by 4 to 6 inches helps limit reflux when you re lying down. Put blocks under the head of your bed frame or a wedge under your mattress to raise it.   Other changes    Lose weight, if you need to    Don t exercise near bedtime    Don't wear tight-fitting clothes    Limit aspirin and ibuprofen    Stop smoking    [employer] last reviewed this educational content on 6/1/2019 2000-2021 [employer]. All rights reserved. This information is not intended as a substitute for professional medical care. Always follow your healthcare professional's instructions.           
NONE

## 2023-03-30 NOTE — ED ADULT NURSE NOTE - CAS EDN DISCHARGE ASSESSMENT
[Dear  ___] : Dear  [unfilled], [Courtesy Letter:] : I had the pleasure of seeing your patient, [unfilled], in my office today. [Please see my note below.] : Please see my note below. [Consult Closing:] : Thank you very much for allowing me to participate in the care of this patient.  If you have any questions, please do not hesitate to contact me. [Sincerely,] : Sincerely, [FreeTextEntry3] : Katie Fernandez MD Alert and oriented to person, place and time

## 2023-08-07 NOTE — PATIENT PROFILE ADULT. - FUNCTIONAL SCREEN CURRENT LEVEL: TOILETING, MLM
Expected Patient Referral to ED  10:40 AM    Referring Clinic/Provider:  FM at [facility]    Reason for referral/Clinical facts:  Met 1 month ago for eating disorder.  Pt admitted to [facility] inpatient at that time.  Pt had a lot of abd pain, seen at ED as a result.  Pt is back in outpatient treatment, but losing weight, down a lot of weight with BMI of 12.  Pt being sent to us for hospitalization and initial workup, but they feel she should be admitted for further care either way..      Recommendations provided:  Send to ED for further evaluation    Caller was informed that this institution does possess the capabilities and/or resources to provide for patient and should be transferred to our facility.    Discussed that if direct admit is sought and any hurdles are encountered, this ED would be happy to see the patient and evaluate.    Informed caller that recommendations provided are recommendations based only on the facts provided and that they responsible to accept or reject the advice, or to seek a formal in person consultation as needed and that this ED will see/treat patient should they arrive.      Juanpablo Hickman MD  [facility] EMERGENCY ROOM  [address] [address]  [phone]       Juanpablo Hickman MD  04/13/23 1044     (2) assistive person VITAL SIGNS: I have reviewed nursing notes and confirm.  CONSTITUTIONAL: non-toxic, + disheveled   SKIN: no rash, no petechiae.  EYES: pink conjunctiva, anicteric  ENT: tongue midline, no exudates, MMM  NECK: Supple; no meningismus  CARD: RRR, no murmurs, equal radial pulses bilaterally 2+  RESP: CTAB, no respiratory distress  ABD: Soft, non-tender, non-distended  BACK: no stepoffs, no spinal tenderness   EXT: Normal ROM x4. No edema.   NEURO: Alert, oriented. CN2-12 intact, equal strength bilaterally, sensation intact, no slurred speech   PSYCH: Cooperative, appropriate.

## 2023-08-15 ENCOUNTER — OFFICE VISIT (OUTPATIENT)
Dept: URBAN - METROPOLITAN AREA CLINIC 46 | Facility: CLINIC | Age: 73
End: 2023-08-15
Payer: MEDICARE

## 2023-08-15 DIAGNOSIS — H16.143 PUNCTATE KERATITIS, BILATERAL: ICD-10-CM

## 2023-08-15 DIAGNOSIS — E11.3393 DIABETES MELLITUS TYPE 2 WITH MODERATE NON-PROLIFERATIVE RETINOPATHY WITHOUT MACULAR EDEMA, BILATERAL: Primary | ICD-10-CM

## 2023-08-15 DIAGNOSIS — Z96.1 PRESENCE OF INTRAOCULAR LENS: ICD-10-CM

## 2023-08-15 DIAGNOSIS — H16.223 KERATOCONJUNCTIVITIS SICCA, BILATERAL: ICD-10-CM

## 2023-08-15 PROCEDURE — 92134 CPTRZ OPH DX IMG PST SGM RTA: CPT | Performed by: OPTOMETRIST

## 2023-08-15 PROCEDURE — 99204 OFFICE O/P NEW MOD 45 MIN: CPT | Performed by: OPTOMETRIST

## 2023-08-15 ASSESSMENT — INTRAOCULAR PRESSURE
OD: 14
OS: 14

## 2023-11-08 NOTE — DISCHARGE NOTE ADULT - WITHDRAWAL SYMPTOMS INCLUDE; NEGATIVE MOOD, URGES TO SMOKE, AND DIFFICULTY CONCENTRATING.
Bedside report received from night RN, pt care assumed, assessment completed. Pt is A&O4, pain 0/10, pt is medical on the monitor. Updated on POC, questions answered. Bed in lowest, locked position, treaded socks on, call light and belongings within reach.      Statement Selected

## 2024-02-19 ENCOUNTER — OFFICE VISIT (OUTPATIENT)
Dept: URBAN - METROPOLITAN AREA CLINIC 46 | Facility: CLINIC | Age: 74
End: 2024-02-19
Payer: MEDICARE

## 2024-02-19 DIAGNOSIS — Z96.1 PRESENCE OF INTRAOCULAR LENS: ICD-10-CM

## 2024-02-19 DIAGNOSIS — E11.3393 DIABETES MELLITUS TYPE 2 WITH MODERATE NON-PROLIFERATIVE RETINOPATHY WITHOUT MACULAR EDEMA, BILATERAL: Primary | ICD-10-CM

## 2024-02-19 DIAGNOSIS — H35.81 RETINAL COTTON WOOL SPOTS: ICD-10-CM

## 2024-02-19 DIAGNOSIS — H16.223 KERATOCONJUNCTIVITIS SICCA, BILATERAL: ICD-10-CM

## 2024-02-19 DIAGNOSIS — H16.143 PUNCTATE KERATITIS, BILATERAL: ICD-10-CM

## 2024-02-19 PROCEDURE — 92134 CPTRZ OPH DX IMG PST SGM RTA: CPT | Performed by: OPTOMETRIST

## 2024-02-19 PROCEDURE — 99214 OFFICE O/P EST MOD 30 MIN: CPT | Performed by: OPTOMETRIST

## 2024-02-19 ASSESSMENT — INTRAOCULAR PRESSURE
OD: 15
OS: 14

## 2024-07-10 ENCOUNTER — OFFICE VISIT (OUTPATIENT)
Dept: URBAN - METROPOLITAN AREA CLINIC 46 | Facility: CLINIC | Age: 74
End: 2024-07-10
Payer: MEDICARE

## 2024-07-10 DIAGNOSIS — E11.3393 DIABETES MELLITUS TYPE 2 WITH MODERATE NON-PROLIFERATIVE RETINOPATHY WITHOUT MACULAR EDEMA, BILATERAL: ICD-10-CM

## 2024-07-10 DIAGNOSIS — H16.223 KERATOCONJUNCTIVITIS SICCA, BILATERAL: ICD-10-CM

## 2024-07-10 DIAGNOSIS — H16.143 PUNCTATE KERATITIS, BILATERAL: ICD-10-CM

## 2024-07-10 DIAGNOSIS — H43.12 VITREOUS HEMORRHAGE, LEFT EYE: Primary | ICD-10-CM

## 2024-07-10 DIAGNOSIS — Z96.1 PRESENCE OF INTRAOCULAR LENS: ICD-10-CM

## 2024-07-10 DIAGNOSIS — H35.81 RETINAL COTTON WOOL SPOTS: ICD-10-CM

## 2024-07-10 PROCEDURE — 92250 FUNDUS PHOTOGRAPHY W/I&R: CPT | Performed by: OPTOMETRIST

## 2024-07-10 PROCEDURE — 99214 OFFICE O/P EST MOD 30 MIN: CPT | Performed by: OPTOMETRIST

## 2024-07-10 ASSESSMENT — INTRAOCULAR PRESSURE
OD: 14
OS: 14

## 2024-07-24 ENCOUNTER — OFFICE VISIT (OUTPATIENT)
Dept: URBAN - METROPOLITAN AREA CLINIC 54 | Facility: CLINIC | Age: 74
End: 2024-07-24
Payer: MEDICARE

## 2024-07-24 DIAGNOSIS — E11.3491 TYPE 2 DIABETES MELLITUS WITH SEVERE NONPROLIFERATIVE DIABETIC RETINOPATHY WITHOUT MACULAR EDEMA OF RIGHT EYE: ICD-10-CM

## 2024-07-24 DIAGNOSIS — E11.3592 TYPE 2 DIABETES MELLITUS WITH PROLIFERATIVE DIABETIC RETINOPATHY WITHOUT MACULAR EDEMA OF LEFT EYE: Primary | ICD-10-CM

## 2024-07-24 DIAGNOSIS — H43.12 VITREOUS HEMORRHAGE, LEFT EYE: ICD-10-CM

## 2024-07-24 PROCEDURE — 92134 CPTRZ OPH DX IMG PST SGM RTA: CPT | Performed by: OPHTHALMOLOGY

## 2024-07-24 PROCEDURE — 99204 OFFICE O/P NEW MOD 45 MIN: CPT | Performed by: OPHTHALMOLOGY

## 2024-07-24 PROCEDURE — 67028 INJECTION EYE DRUG: CPT | Performed by: OPHTHALMOLOGY

## 2024-07-24 ASSESSMENT — INTRAOCULAR PRESSURE
OS: 10
OD: 10

## 2024-08-19 ENCOUNTER — OFFICE VISIT (OUTPATIENT)
Dept: URBAN - METROPOLITAN AREA CLINIC 46 | Facility: CLINIC | Age: 74
End: 2024-08-19
Payer: MEDICARE

## 2024-08-19 DIAGNOSIS — H43.12 VITREOUS HEMORRHAGE, LEFT EYE: ICD-10-CM

## 2024-08-19 DIAGNOSIS — H35.81 RETINAL COTTON WOOL SPOTS: ICD-10-CM

## 2024-08-19 DIAGNOSIS — E11.3512 DIABETES MELLITUS TYPE 2 WITH PROLIFERATIVE RETINOPATHY WITH MACULAR EDEMA, LEFT EYE: ICD-10-CM

## 2024-08-19 DIAGNOSIS — H16.223 KERATOCONJUNCTIVITIS SICCA, BILATERAL: ICD-10-CM

## 2024-08-19 DIAGNOSIS — Z96.1 PRESENCE OF INTRAOCULAR LENS: ICD-10-CM

## 2024-08-19 DIAGNOSIS — H16.143 PUNCTATE KERATITIS, BILATERAL: ICD-10-CM

## 2024-08-19 DIAGNOSIS — E11.3491 TYPE 2 DIABETES MELLITUS WITH SEVERE NONPROLIFERATIVE DIABETIC RETINOPATHY WITHOUT MACULAR EDEMA, RIGHT EYE: Primary | ICD-10-CM

## 2024-08-19 PROCEDURE — 99214 OFFICE O/P EST MOD 30 MIN: CPT | Performed by: OPTOMETRIST

## 2024-08-19 PROCEDURE — 92134 CPTRZ OPH DX IMG PST SGM RTA: CPT | Performed by: OPTOMETRIST

## 2024-08-19 ASSESSMENT — INTRAOCULAR PRESSURE
OD: 14
OS: 14

## 2024-09-24 ENCOUNTER — OFFICE VISIT (OUTPATIENT)
Dept: URBAN - METROPOLITAN AREA CLINIC 49 | Facility: LOCATION | Age: 74
End: 2024-09-24
Payer: MEDICARE

## 2024-09-24 DIAGNOSIS — E11.3592 TYPE 2 DIABETES MELLITUS WITH PROLIFERATIVE DIABETIC RETINOPATHY WITHOUT MACULAR EDEMA OF LEFT EYE: Primary | ICD-10-CM

## 2024-09-24 DIAGNOSIS — E11.3491 TYPE 2 DIABETES MELLITUS WITH SEVERE NONPROLIFERATIVE DIABETIC RETINOPATHY WITHOUT MACULAR EDEMA, RIGHT EYE: ICD-10-CM

## 2024-09-24 DIAGNOSIS — H43.12 VITREOUS HEMORRHAGE, LEFT EYE: ICD-10-CM

## 2024-09-24 PROCEDURE — 67028 INJECTION EYE DRUG: CPT | Performed by: OPHTHALMOLOGY

## 2024-09-24 PROCEDURE — 92134 CPTRZ OPH DX IMG PST SGM RTA: CPT | Performed by: OPHTHALMOLOGY

## 2024-09-24 ASSESSMENT — INTRAOCULAR PRESSURE
OD: 12
OS: 20

## 2024-12-24 ENCOUNTER — OFFICE VISIT (OUTPATIENT)
Dept: URBAN - METROPOLITAN AREA CLINIC 49 | Facility: LOCATION | Age: 74
End: 2024-12-24
Payer: MEDICARE

## 2024-12-24 DIAGNOSIS — E11.3491 TYPE 2 DIABETES MELLITUS WITH SEVERE NONPROLIFERATIVE DIABETIC RETINOPATHY WITHOUT MACULAR EDEMA, RIGHT EYE: ICD-10-CM

## 2024-12-24 DIAGNOSIS — E11.3592 TYPE 2 DIABETES MELLITUS WITH PROLIFERATIVE DIABETIC RETINOPATHY WITHOUT MACULAR EDEMA OF LEFT EYE: Primary | ICD-10-CM

## 2024-12-24 DIAGNOSIS — H43.12 VITREOUS HEMORRHAGE, LEFT EYE: ICD-10-CM

## 2024-12-24 PROCEDURE — 99213 OFFICE O/P EST LOW 20 MIN: CPT | Performed by: OPHTHALMOLOGY

## 2024-12-24 PROCEDURE — 92134 CPTRZ OPH DX IMG PST SGM RTA: CPT | Performed by: OPHTHALMOLOGY

## 2024-12-24 PROCEDURE — 67028 INJECTION EYE DRUG: CPT | Performed by: OPHTHALMOLOGY

## 2024-12-24 ASSESSMENT — INTRAOCULAR PRESSURE
OS: 14
OD: 15

## 2025-02-25 ENCOUNTER — OFFICE VISIT (OUTPATIENT)
Dept: URBAN - METROPOLITAN AREA CLINIC 54 | Facility: CLINIC | Age: 75
End: 2025-02-25
Payer: MEDICARE

## 2025-02-25 DIAGNOSIS — E11.3592 TYPE 2 DIABETES MELLITUS WITH PROLIFERATIVE DIABETIC RETINOPATHY WITHOUT MACULAR EDEMA, LEFT EYE: Primary | ICD-10-CM

## 2025-02-25 PROCEDURE — 67228 TREATMENT X10SV RETINOPATHY: CPT | Performed by: OPHTHALMOLOGY

## 2025-02-25 ASSESSMENT — INTRAOCULAR PRESSURE
OS: 14
OD: 20

## 2025-04-15 NOTE — PATIENT PROFILE ADULT. - ARE SIGNIFICANT INDICATORS COMPLETE.
See previous triage note dated 4/15/25 from [facility].  Future Appointments   Date Time Provider Department Center   4/15/2025  3:00 PM Akosua Faarh MD [id number]   4/23/2025  4:30 PM Cee Ventura, [id number]        Yes

## 2025-05-05 ENCOUNTER — OFFICE VISIT (OUTPATIENT)
Dept: URBAN - METROPOLITAN AREA CLINIC 46 | Facility: CLINIC | Age: 75
End: 2025-05-05
Payer: MEDICARE

## 2025-05-05 DIAGNOSIS — H16.223 KERATOCONJUNCTIVITIS SICCA, BILATERAL: ICD-10-CM

## 2025-05-05 DIAGNOSIS — H10.45 OTHER CHRONIC ALLERGIC CONJUNCTIVITIS: ICD-10-CM

## 2025-05-05 DIAGNOSIS — E11.3592 TYPE 2 DIABETES MELLITUS WITH PROLIFERATIVE DIABETIC RETINOPATHY WITHOUT MACULAR EDEMA, LEFT EYE: Primary | ICD-10-CM

## 2025-05-05 DIAGNOSIS — E11.3491 TYPE 2 DIABETES MELLITUS WITH SEVERE NONPROLIFERATIVE DIABETIC RETINOPATHY WITHOUT MACULAR EDEMA OF RIGHT EYE: ICD-10-CM

## 2025-05-05 DIAGNOSIS — Z96.1 PRESENCE OF INTRAOCULAR LENS: ICD-10-CM

## 2025-05-05 PROCEDURE — 99214 OFFICE O/P EST MOD 30 MIN: CPT | Performed by: OPTOMETRIST

## 2025-05-05 PROCEDURE — 92134 CPTRZ OPH DX IMG PST SGM RTA: CPT | Performed by: OPTOMETRIST

## 2025-05-05 ASSESSMENT — KERATOMETRY
OD: 41.80
OS: 41.15

## 2025-05-05 ASSESSMENT — INTRAOCULAR PRESSURE: OD: 15

## 2025-06-02 ENCOUNTER — OFFICE VISIT (OUTPATIENT)
Dept: URBAN - METROPOLITAN AREA CLINIC 49 | Facility: LOCATION | Age: 75
End: 2025-06-02
Payer: MEDICARE

## 2025-06-02 DIAGNOSIS — E11.3592 TYPE 2 DIABETES MELLITUS WITH PROLIFERATIVE DIABETIC RETINOPATHY WITHOUT MACULAR EDEMA, LEFT EYE: Primary | ICD-10-CM

## 2025-06-02 DIAGNOSIS — H43.12 VITREOUS HEMORRHAGE, LEFT EYE: ICD-10-CM

## 2025-06-02 DIAGNOSIS — E11.3491 TYPE 2 DIABETES MELLITUS WITH SEVERE NONPROLIFERATIVE DIABETIC RETINOPATHY WITHOUT MACULAR EDEMA OF RIGHT EYE: ICD-10-CM

## 2025-06-02 PROCEDURE — 92134 CPTRZ OPH DX IMG PST SGM RTA: CPT | Performed by: OPHTHALMOLOGY

## 2025-06-02 PROCEDURE — 99213 OFFICE O/P EST LOW 20 MIN: CPT | Performed by: OPHTHALMOLOGY

## 2025-06-02 ASSESSMENT — INTRAOCULAR PRESSURE
OS: 15
OD: 16